# Patient Record
Sex: FEMALE | Race: BLACK OR AFRICAN AMERICAN | NOT HISPANIC OR LATINO | Employment: FULL TIME | URBAN - METROPOLITAN AREA
[De-identification: names, ages, dates, MRNs, and addresses within clinical notes are randomized per-mention and may not be internally consistent; named-entity substitution may affect disease eponyms.]

---

## 2017-01-20 ENCOUNTER — HOSPITAL ENCOUNTER (EMERGENCY)
Facility: HOSPITAL | Age: 47
Discharge: HOME | End: 2017-01-20
Attending: EMERGENCY MEDICINE

## 2017-01-20 VITALS
HEIGHT: 67 IN | SYSTOLIC BLOOD PRESSURE: 113 MMHG | DIASTOLIC BLOOD PRESSURE: 67 MMHG | OXYGEN SATURATION: 100 % | HEART RATE: 60 BPM | TEMPERATURE: 99 F | RESPIRATION RATE: 20 BRPM | BODY MASS INDEX: 29.66 KG/M2 | WEIGHT: 189 LBS

## 2017-01-20 DIAGNOSIS — G43.009 MIGRAINE WITHOUT AURA AND WITHOUT STATUS MIGRAINOSUS, NOT INTRACTABLE: Primary | ICD-10-CM

## 2017-01-20 DIAGNOSIS — R07.89 ATYPICAL CHEST PAIN: ICD-10-CM

## 2017-01-20 LAB
ALBUMIN SERPL BCP-MCNC: 4.1 G/DL
ALP SERPL-CCNC: 93 U/L
ALT SERPL W/O P-5'-P-CCNC: 8 U/L
ANION GAP SERPL CALC-SCNC: 12 MMOL/L
AST SERPL-CCNC: 18 U/L
BASOPHILS # BLD AUTO: 0.01 K/UL
BASOPHILS NFR BLD: 0.1 %
BILIRUB SERPL-MCNC: 0.4 MG/DL
BNP SERPL-MCNC: <10 PG/ML
BUN SERPL-MCNC: 12 MG/DL
CALCIUM SERPL-MCNC: 8.8 MG/DL
CHLORIDE SERPL-SCNC: 105 MMOL/L
CO2 SERPL-SCNC: 21 MMOL/L
CREAT SERPL-MCNC: 0.7 MG/DL
DIFFERENTIAL METHOD: NORMAL
EOSINOPHIL # BLD AUTO: 0 K/UL
EOSINOPHIL NFR BLD: 0.6 %
ERYTHROCYTE [DISTWIDTH] IN BLOOD BY AUTOMATED COUNT: 13.3 %
EST. GFR  (AFRICAN AMERICAN): >60 ML/MIN/1.73 M^2
EST. GFR  (NON AFRICAN AMERICAN): >60 ML/MIN/1.73 M^2
GLUCOSE SERPL-MCNC: 89 MG/DL
HCT VFR BLD AUTO: 37.4 %
HGB BLD-MCNC: 12.5 G/DL
INR PPP: 1
LYMPHOCYTES # BLD AUTO: 2.2 K/UL
LYMPHOCYTES NFR BLD: 33 %
MCH RBC QN AUTO: 29.3 PG
MCHC RBC AUTO-ENTMCNC: 33.4 %
MCV RBC AUTO: 88 FL
MONOCYTES # BLD AUTO: 0.6 K/UL
MONOCYTES NFR BLD: 8.4 %
NEUTROPHILS # BLD AUTO: 3.9 K/UL
NEUTROPHILS NFR BLD: 57.9 %
PLATELET # BLD AUTO: 280 K/UL
PMV BLD AUTO: 9.7 FL
POTASSIUM SERPL-SCNC: 4.3 MMOL/L
PROT SERPL-MCNC: 7.9 G/DL
PROTHROMBIN TIME: 10.4 SEC
RBC # BLD AUTO: 4.27 M/UL
SODIUM SERPL-SCNC: 138 MMOL/L
TROPONIN I SERPL DL<=0.01 NG/ML-MCNC: 0.01 NG/ML
WBC # BLD AUTO: 6.79 K/UL

## 2017-01-20 PROCEDURE — 83880 ASSAY OF NATRIURETIC PEPTIDE: CPT

## 2017-01-20 PROCEDURE — 85610 PROTHROMBIN TIME: CPT

## 2017-01-20 PROCEDURE — 25000003 PHARM REV CODE 250: Performed by: EMERGENCY MEDICINE

## 2017-01-20 PROCEDURE — 85025 COMPLETE CBC W/AUTO DIFF WBC: CPT

## 2017-01-20 PROCEDURE — 63600175 PHARM REV CODE 636 W HCPCS: Performed by: EMERGENCY MEDICINE

## 2017-01-20 PROCEDURE — 84484 ASSAY OF TROPONIN QUANT: CPT

## 2017-01-20 PROCEDURE — C9113 INJ PANTOPRAZOLE SODIUM, VIA: HCPCS | Performed by: EMERGENCY MEDICINE

## 2017-01-20 PROCEDURE — 99284 EMERGENCY DEPT VISIT MOD MDM: CPT | Mod: 25

## 2017-01-20 PROCEDURE — 93010 ELECTROCARDIOGRAM REPORT: CPT | Mod: ,,, | Performed by: INTERNAL MEDICINE

## 2017-01-20 PROCEDURE — 96361 HYDRATE IV INFUSION ADD-ON: CPT

## 2017-01-20 PROCEDURE — 96374 THER/PROPH/DIAG INJ IV PUSH: CPT

## 2017-01-20 PROCEDURE — 93005 ELECTROCARDIOGRAM TRACING: CPT

## 2017-01-20 PROCEDURE — 80053 COMPREHEN METABOLIC PANEL: CPT

## 2017-01-20 RX ORDER — ASPIRIN 325 MG
325 TABLET ORAL
Status: COMPLETED | OUTPATIENT
Start: 2017-01-20 | End: 2017-01-20

## 2017-01-20 RX ORDER — PROMETHAZINE HYDROCHLORIDE 25 MG/1
25 TABLET ORAL EVERY 6 HOURS PRN
Qty: 15 TABLET | Refills: 0 | Status: SHIPPED | OUTPATIENT
Start: 2017-01-20 | End: 2017-09-01

## 2017-01-20 RX ORDER — PANTOPRAZOLE SODIUM 40 MG/10ML
40 INJECTION, POWDER, LYOPHILIZED, FOR SOLUTION INTRAVENOUS
Status: COMPLETED | OUTPATIENT
Start: 2017-01-20 | End: 2017-01-20

## 2017-01-20 RX ADMIN — LIDOCAINE HYDROCHLORIDE: 20 SOLUTION ORAL; TOPICAL at 04:01

## 2017-01-20 RX ADMIN — ASPIRIN 325 MG ORAL TABLET 325 MG: 325 PILL ORAL at 04:01

## 2017-01-20 RX ADMIN — SODIUM CHLORIDE 500 ML: 0.9 INJECTION, SOLUTION INTRAVENOUS at 04:01

## 2017-01-20 RX ADMIN — PANTOPRAZOLE SODIUM 40 MG: 40 INJECTION, POWDER, FOR SOLUTION INTRAVENOUS at 04:01

## 2017-01-20 NOTE — ED AVS SNAPSHOT
[facility]  [address] [place] [address] Ave  [place] [address]               Leni Henson   2017  3:07 PM   ED    Description:  Female : 1970   Department:  [facility]           Your Care was Coordinated By:     Provider Role From To    Gayatri Jarvis MD Attending Provider [id number] --      Reason for Visit     Chest Pain           Diagnoses this Visit        Comments    Migraine without aura and without status migrainosus, not intractable    -  Primary     Atypical chest pain           ED Disposition     ED Disposition Condition Comment    Discharge             To Do List           Follow-up Information     Please follow up.    Why:  [facility] (Tel: [phone])       These Medications        Disp Refills Start End    promethazine (PHENERGAN) 25 MG tablet 15 tablet 0 2017     Take 1 tablet (25 mg total) by mouth every 6 (six) hours as needed for Nausea (headache). - Oral      [facility]     [facility] -  Assistance  Registered nurses in the [facility] provide clinical advisement, health education, appointment booking, and other advisory services.  Call for this free service at [phone].             Medications           Message regarding Medications     Verify the changes and/or additions to your medication regime listed below are the same as discussed with your clinician today.  If any of these changes or additions are incorrect, please notify your healthcare provider.        START taking these NEW medications        Refills    promethazine (PHENERGAN) 25 MG tablet 0    Sig: Take 1 tablet (25 mg total) by mouth every 6 (six) hours as needed for Nausea (headache).    Class: Print    Route: Oral      These medications were administered today        Dose Freq    aspirin tablet 325 mg 325 mg ED 1 Time    Sig: Take 1 tablet (325 mg total) by mouth ED 1 Time.    Class: Normal     "Route: Oral    pantoprazole injection 40 mg 40 mg ED 1 Time    Sig: Inject 40 mg into the vein ED 1 Time.    Class: Normal    Route: Intravenous    (pyxis) gi cocktail (mylanta 30 mL, lidocaine 2 % viscous 10 mL, dicyclomine 10 mL) 50 mL  ED 1 Time    Sig: Take by mouth ED 1 Time.    Class: Normal    Route: Oral    sodium chloride 0.9% bolus 500 mL 500 mL ED 1 Time    Sig: Inject 500 mLs into the vein ED 1 Time.    Class: Normal    Route: Intravenous           Verify that the below list of medications is an accurate representation of the medications you are currently taking.  If none reported, the list may be blank. If incorrect, please contact your healthcare provider. Carry this list with you in case of emergency.           Current Medications     lidocaine HCl 2% (LIDOCAINE VISCOUS) 2 % Soln by Mucous Membrane route every 4 (four) hours. DILUTE 10 MILLILITER(S) IN SMALL AMOUNT OF WARM WATER, GARGLE AND SPIT    promethazine (PHENERGAN) 25 MG tablet Take 1 tablet (25 mg total) by mouth every 6 (six) hours as needed for Nausea (headache).           Clinical Reference Information           Your Vitals Were     BP Pulse Temp Resp Height Weight    113/67 60 98.7 °F (37.1 °C) 20 5' 7" (1.702 m) 85.7 kg (189 lb)    SpO2 BMI             100% 29.6 kg/m2         Allergies as of 1/20/2017     No Known Allergies      Immunizations Administered on Date of Encounter - 1/20/2017     None      ED Micro, Lab, POCT     Start Ordered       Status Ordering Provider    01/20/17 1900 01/20/17 1755  Troponin I  Once      Final result     01/20/17 1600 01/20/17 1600  CBC auto differential  STAT      Final result     01/20/17 1600 01/20/17 1600  Comprehensive metabolic panel  STAT      Final result     01/20/17 1600 01/20/17 1600  Protime-INR  STAT      Final result     01/20/17 1600 01/20/17 1600  Troponin I  Now then every 3 hours     Comments:  PLEASE REVIEW ORDER START TIME BEFORE MARKING SPECIMEN COLLECTED.   Start Status   01/20/17 " 1600 Final result   01/20/17 1900 Final result       Acknowledged     01/20/17 1600 01/20/17 1600  B-Type natriuretic peptide (BNP)  STAT      Final result       ED Imaging Orders     Start Ordered       Status Ordering Provider    01/20/17 1600 01/20/17 1600  X-Ray Chest PA And Lateral  1 time imaging      Final result         Discharge Instructions         Noncardiac Chest Pain    Based on your visit today, the health care provider doesnt know what is causing your chest pain. In most cases, people who come to the emergency department with chest pain dont have a problem with their heart. Instead, the pain is caused by other conditions. These may be problems with the lungs, muscles, bones, digestive tract, nerves, or mental health.  Lung problems  · Inflammation around the lungs (pleurisy)  · Collapsed lung (pneumothorax)  · Fluid around the lungs (pleural effusion)  · Lung cancer. This is a rare cause of chest pain.  Muscle or bone problems  · Inflamed cartilage between the ribs (pleurisy)  · Fibromyalgia  · Rheumatoid arthritis  Digestive system problems  · Reflux  · Stomach ulcer  · Spasms of the esophagus  · Gall stones  · Gallbladder inflammation  Mental health conditions  · Panic or anxiety attacks  · Emotional distress  Your condition doesnt seem serious and your pain doesnt appear to be coming from your heart. But sometimes the signs of a serious problem take more time to appear. Watch for the warning signs listed below.  Home care  Follow these guidelines when caring for yourself at home:  · Rest today and avoid strenuous activity.  · Take any prescribed medicine as directed.  Follow-up care  Follow up with your health care provider, or as advised, if you dont start to feel better within 24 hours.  When to seek medical advice  Call your health care provider right away if any of these occur:  · A change in the type of pain. Call if it feels different, becomes more serious, lasts longer, or begins to  spread into your shoulder, arm, neck, jaw, or back.  · Shortness of breath  · You feel more pain when you breathe  · Cough with dark-colored mucus or blood  · Weakness, dizziness, or fainting  · Fever of 100.4ºF (38ºC) or higher, or as directed by your health care provider  · Swelling, pain, or redness in one leg  © 6117-1749 [employer]. [address] [place] [address], [place] [address]. All rights reserved. This information is not intended as a substitute for professional medical care. Always follow your healthcare professional's instructions.          Migraine Headache  This often severe type of headache is different from other types of headaches in that symptoms other than pain occur with the headache. Nausea and vomiting, lightheadedness, sensitivity to light (photophobia), and other visual disturbances are common migraine symptoms. The pain may last from a few hours to several days. It is not clear why migraines occur but transient factors called triggers can raise the risk of having a migraine attack. A migraine may be triggered by emotional stress or depression, or by hormone changes during the menstrual cycle. Other triggers include birth control pills, overuse of migraine medicines, alcohol or caffeine, foods with tyramine, eye strain, weather changes, missed meals, or too little or too much sleep.  Home care  Follow these tips when taking care of yourself at home:  · Dont drive yourself home if you were given pain medicine for your headache. Instead, have someone else drive you home. Try to sleep when you get home. You should feel much better when you wake up.  · Cold can help ease migraine symptoms. Put an ice pack on your forehead or at the base of your skull. Put heat on the back of your neck to help ease any neck spasm.  · Drink only clear liquids or eat a light diet until your symptoms get better. This will help you avoid nausea and vomiting.  How to prevent migraines  Pay attention to what  seems to trigger your headache. Try to avoid the triggers when you can. If you have frequent headaches, consider keeping a headache diary. In it, write down what you were doing, feeling, or eating in the hours before each headache. Show this to your health care provider to help find the cause of your headaches.  If stress seems to be a trigger for your headaches, figure out what is causing stress in your life. Learn new ways to handle your stress. Ideas include regular exercise, biofeedback, self-hypnosis, and meditation. Talk with your health care provider to find out more information about managing stress. Many books and digital media are also available on this subject.  Tyramine is a substance found in many foods. It can trigger a migraine in some people. These foods contain tyramine:  · Chocolate  · Yogurt  · All cheeses but cottage cheese and cream cheese  · Smoked or pickled fish and meat, including herring, caviar, bologna, pepperoni, and salami  · Liver  · Avocados  · Bananas  · Figs  · Raisins  · Red wine  Try staying away from these foods for 1 to 2 months to see if you have fewer headaches.  How to treat future headaches  · Take time out at the first sign of a headache, if possible. Find a quiet, dark, comfortable place to sit or lie down. Let yourself relax or sleep.  · Put an ice pack on your forehead or on the area of greatest pain. A heating pad and massage may help if you are having a muscle spasm and tightness in your neck.  · If you have been prescribed a medicine to stop a migraine headache, use this at the first warning sign of the headache for best results. First signs may be an aura or pain.  · If you need to take medicine often for your migraine, talk with your healthcare provider about other ways to prevent your headaches.  Follow-up care  Follow up with your healthcare provider if your headache doesnt get better within the next 24 hours. Talk with your provider if you have frequent  headaches. He or she can figure out a treatment plan. Ask if you can have medicine to take at home the next time you get a bad headache. This may keep you from having to visit the emergency department in the future. You may need to see a headache specialist (neurologist) if you continue to have headaches.  When to seek medical advice  Call your healthcare provider right away if any of these occur:  · Your head pain gets worse, or doesnt get better within 24 hours  · You cant keep liquids down (repeated vomiting)  · Pain in your sinuses, ears, or throat  · Fever of 100.4º F (38º C) or higher, or as directed by your healthcare provider  · Stiff neck  · Extreme drowsiness, confusion, or fainting  · Dizziness, or dizziness with spinning sensation (vertigo)  · Weakness in an arm or leg, or on one side of your face  · Difficulty talking or seeing  © 6677-1391 "[employer]". [address] [place] [address]. All rights reserved. This information is not intended as a substitute for professional medical care. Always follow your healthcare professional's instructions.          [facility] Sign-Up     Activating your [facility] account is as easy as 1-2-3!     1) Visit [employer] [email or web address], select Sign Up Now, enter this activation code and your date of birth, then select Next.  [email or web address]  Expires: 3/6/2017  7:45 PM      2) Create a username and password to use when you visit [facility] in the future and select a security question in case you lose your password and select Next.    3) Enter your e-mail address and click Sign Up!    Additional Information  If you have questions, please e-mail [email or web address] or call [phone] to talk to our [facility] staff. Remember, [facility] is NOT to be used for urgent needs. For medical emergencies, dial 911.          [facility] complies with applicable Federal civil rights laws and does not discriminate on the basis of race, color, national  origin, age, disability, or sex.        Language Assistance Services     ATTENTION: Language assistance services are available, free of charge. Please call [phone].      ATENCIÓN: Si habrandi combs, tiene a crandall disposición servicios gratuitos de asistencia lingüística. Llame al [phone].     CHÚ Ý: N?u b?n nói Ti?ng Vi?t, có các d?ch v? h? tr? ngôn ng? mi?n phí dành cho b?n. G?i s? [phone].

## 2017-01-20 NOTE — ED NOTES
APPEARANCE: Alert, oriented and in no acute distress.  PERIPHERAL VASCULAR: peripheral pulses present. Normal cap refill. No edema. Warm to touch.    RESPIRATORY:Normal rate and effort, breath sounds clear bilaterally throughout chest. Respirations are equal and unlabored no obvious signs of distress.  GASTRO: soft, bowel sounds normal, no tenderness, no abdominal distention.  MUSC: Full ROM. No bony tenderness or soft tissue tenderness. No obvious deformity.  SKIN: Skin is warm and dry, normal skin turgor, mucous membranes moist.  NEURO: 5/5 strength major flexors/extensors bilaterally. Sensory intact to light touch bilaterally. Ana coma scale: eyes open spontaneously-4, oriented & converses-5, obeys commands-6. No neurological abnormalities.   MENTAL STATUS: awake, alert and aware of environment.  EYE: PERRL, both eyes: pupils brisk and reactive to light. Normal size.  ENT: EARS: no obvious drainage. NOSE: no active bleeding.

## 2017-01-20 NOTE — ED PROVIDER NOTES
Encounter Date: 1/20/2017       History     Chief Complaint   Patient presents with    Chest Pain     began this morning, headache, vomited once today, dizziness     Review of patient's allergies indicates:  No Known Allergies  The history is provided by the patient.    the patient presents emergency department with chest pain and a headache that awakened her at 3 AM.  She went to work and was dizzy at work.  She had her blood pressure checked at work and her systolic blood pressure was 190, she is unsure what her diastolic blood pressure once.  The headache and chest pain has been persistent since 3 AM this morning.  The patient states that her chest pain is a heaviness to her midsternal region and is nonradiating.  The patient denies a cough or fever no shortness of breath no diaphoresis.  Patient has no history of coronary artery disease.  History reviewed. No pertinent past medical history.  No past medical history pertinent negatives.  Past Surgical History   Procedure Laterality Date    Tubal ligation       History reviewed. No pertinent family history.  Social History   Substance Use Topics    Smoking status: Never Smoker    Smokeless tobacco: None    Alcohol use Yes      Comment: social     Review of Systems   Constitutional: Negative for fever.   HENT: Negative for sore throat.    Respiratory: Positive for chest tightness. Negative for cough and shortness of breath.    Cardiovascular: Negative for chest pain, palpitations and leg swelling.   Gastrointestinal: Positive for abdominal pain (gastric pain) and nausea. Negative for diarrhea and vomiting.   Genitourinary: Negative for dysuria.   Musculoskeletal: Negative for back pain.   Skin: Negative for rash.   Neurological: Negative for weakness.   Hematological: Does not bruise/bleed easily.       Physical Exam   Initial Vitals   BP Pulse Resp Temp SpO2   01/20/17 1357 01/20/17 1357 01/20/17 1357 01/20/17 1357 01/20/17 1357   123/71 66 20 98.7 °F (37.1  °C) 100 %     Physical Exam    Nursing note and vitals reviewed.  Constitutional: She appears well-developed and well-nourished.   HENT:   Head: Normocephalic and atraumatic.   Mouth/Throat: Oropharynx is clear and moist.   Eyes: Conjunctivae and EOM are normal. Pupils are equal, round, and reactive to light.   Neck: Normal range of motion. Neck supple.   Cardiovascular: Normal rate, regular rhythm, normal heart sounds and intact distal pulses. Exam reveals no gallop and no friction rub.    No murmur heard.  Pulmonary/Chest: Breath sounds normal.   Abdominal: Soft. Bowel sounds are normal. She exhibits no distension. There is no tenderness. There is no rebound and no guarding.   Musculoskeletal: Normal range of motion. She exhibits no edema or tenderness.   Lymphadenopathy:     She has no cervical adenopathy.   Neurological: She is alert and oriented to person, place, and time. She has normal strength and normal reflexes.   Skin: Skin is warm and dry.   Psychiatric: She has a normal mood and affect. Her behavior is normal. Judgment and thought content normal.         ED Course   Procedures  Labs Reviewed   CBC W/ AUTO DIFFERENTIAL   COMPREHENSIVE METABOLIC PANEL   PROTIME-INR   TROPONIN I   B-TYPE NATRIURETIC PEPTIDE   TROPONIN I     EKG Readings: (Independently Interpreted)   Rhythm: Normal Sinus Rhythm. Heart Rate: 61. Ectopy: No Ectopy. Conduction: Normal. ST Segments: Normal ST Segments. T Waves: Normal. Clinical Impression: Normal Sinus Rhythm          Medical Decision Making:   Clinical Tests:   Lab Tests: Ordered and Reviewed  The following lab test(s) were unremarkable: CBC, CMP, Troponin, BNP and PT  Radiological Study: Ordered and Reviewed  Medical Tests: Ordered and Reviewed  ED Management:  The patient came to the emergency department with chest pain and headache is 3 AM.  She had complete relief of her chest pain/epigastric pain with proton aches and a GI cocktail.  Her headache is still severe.  She  has photophobia.  She will be discharged with pain medications, she is driving home                   ED Course     Clinical Impression:   The primary encounter diagnosis was Migraine without aura and without status migrainosus, not intractable. A diagnosis of Atypical chest pain was also pertinent to this visit.          Gayatri Jarvis MD  01/20/17 1945

## 2017-01-20 NOTE — ED NOTES
46 year old female presents to ed with chief complaint of chest pain. Patient states chest pain began last night. Patient also complaining of dizziness since this am. Patient denies sob at this time

## 2017-01-21 NOTE — DISCHARGE INSTRUCTIONS
Noncardiac Chest Pain    Based on your visit today, the health care provider doesnt know what is causing your chest pain. In most cases, people who come to the emergency department with chest pain dont have a problem with their heart. Instead, the pain is caused by other conditions. These may be problems with the lungs, muscles, bones, digestive tract, nerves, or mental health.  Lung problems  · Inflammation around the lungs (pleurisy)  · Collapsed lung (pneumothorax)  · Fluid around the lungs (pleural effusion)  · Lung cancer. This is a rare cause of chest pain.  Muscle or bone problems  · Inflamed cartilage between the ribs (pleurisy)  · Fibromyalgia  · Rheumatoid arthritis  Digestive system problems  · Reflux  · Stomach ulcer  · Spasms of the esophagus  · Gall stones  · Gallbladder inflammation  Mental health conditions  · Panic or anxiety attacks  · Emotional distress  Your condition doesnt seem serious and your pain doesnt appear to be coming from your heart. But sometimes the signs of a serious problem take more time to appear. Watch for the warning signs listed below.  Home care  Follow these guidelines when caring for yourself at home:  · Rest today and avoid strenuous activity.  · Take any prescribed medicine as directed.  Follow-up care  Follow up with your health care provider, or as advised, if you dont start to feel better within 24 hours.  When to seek medical advice  Call your health care provider right away if any of these occur:  · A change in the type of pain. Call if it feels different, becomes more serious, lasts longer, or begins to spread into your shoulder, arm, neck, jaw, or back.  · Shortness of breath  · You feel more pain when you breathe  · Cough with dark-colored mucus or blood  · Weakness, dizziness, or fainting  · Fever of 100.4ºF (38ºC) or higher, or as directed by your health care provider  · Swelling, pain, or redness in one leg  © 5318-2845 [employer] [address]  [place] [address]. All rights reserved. This information is not intended as a substitute for professional medical care. Always follow your healthcare professional's instructions.          Migraine Headache  This often severe type of headache is different from other types of headaches in that symptoms other than pain occur with the headache. Nausea and vomiting, lightheadedness, sensitivity to light (photophobia), and other visual disturbances are common migraine symptoms. The pain may last from a few hours to several days. It is not clear why migraines occur but transient factors called triggers can raise the risk of having a migraine attack. A migraine may be triggered by emotional stress or depression, or by hormone changes during the menstrual cycle. Other triggers include birth control pills, overuse of migraine medicines, alcohol or caffeine, foods with tyramine, eye strain, weather changes, missed meals, or too little or too much sleep.  Home care  Follow these tips when taking care of yourself at home:  · Dont drive yourself home if you were given pain medicine for your headache. Instead, have someone else drive you home. Try to sleep when you get home. You should feel much better when you wake up.  · Cold can help ease migraine symptoms. Put an ice pack on your forehead or at the base of your skull. Put heat on the back of your neck to help ease any neck spasm.  · Drink only clear liquids or eat a light diet until your symptoms get better. This will help you avoid nausea and vomiting.  How to prevent migraines  Pay attention to what seems to trigger your headache. Try to avoid the triggers when you can. If you have frequent headaches, consider keeping a headache diary. In it, write down what you were doing, feeling, or eating in the hours before each headache. Show this to your health care provider to help find the cause of your headaches.  If stress seems to be a trigger for your  headaches, figure out what is causing stress in your life. Learn new ways to handle your stress. Ideas include regular exercise, biofeedback, self-hypnosis, and meditation. Talk with your health care provider to find out more information about managing stress. Many books and digital media are also available on this subject.  Tyramine is a substance found in many foods. It can trigger a migraine in some people. These foods contain tyramine:  · Chocolate  · Yogurt  · All cheeses but cottage cheese and cream cheese  · Smoked or pickled fish and meat, including herring, caviar, bologna, pepperoni, and salami  · Liver  · Avocados  · Bananas  · Figs  · Raisins  · Red wine  Try staying away from these foods for 1 to 2 months to see if you have fewer headaches.  How to treat future headaches  · Take time out at the first sign of a headache, if possible. Find a quiet, dark, comfortable place to sit or lie down. Let yourself relax or sleep.  · Put an ice pack on your forehead or on the area of greatest pain. A heating pad and massage may help if you are having a muscle spasm and tightness in your neck.  · If you have been prescribed a medicine to stop a migraine headache, use this at the first warning sign of the headache for best results. First signs may be an aura or pain.  · If you need to take medicine often for your migraine, talk with your healthcare provider about other ways to prevent your headaches.  Follow-up care  Follow up with your healthcare provider if your headache doesnt get better within the next 24 hours. Talk with your provider if you have frequent headaches. He or she can figure out a treatment plan. Ask if you can have medicine to take at home the next time you get a bad headache. This may keep you from having to visit the emergency department in the future. You may need to see a headache specialist (neurologist) if you continue to have headaches.  When to seek medical advice  Call your healthcare  provider right away if any of these occur:  · Your head pain gets worse, or doesnt get better within 24 hours  · You cant keep liquids down (repeated vomiting)  · Pain in your sinuses, ears, or throat  · Fever of 100.4º F (38º C) or higher, or as directed by your healthcare provider  · Stiff neck  · Extreme drowsiness, confusion, or fainting  · Dizziness, or dizziness with spinning sensation (vertigo)  · Weakness in an arm or leg, or on one side of your face  · Difficulty talking or seeing  © 7209-4802 [employer]. [address] [place] [address]. All rights reserved. This information is not intended as a substitute for professional medical care. Always follow your healthcare professional's instructions.

## 2017-08-22 ENCOUNTER — HOSPITAL ENCOUNTER (EMERGENCY)
Facility: HOSPITAL | Age: 47
Discharge: HOME | End: 2017-08-22
Attending: EMERGENCY MEDICINE | Admitting: EMERGENCY MEDICINE

## 2017-08-22 VITALS
BODY MASS INDEX: 28.12 KG/M2 | SYSTOLIC BLOOD PRESSURE: 117 MMHG | DIASTOLIC BLOOD PRESSURE: 69 MMHG | HEIGHT: 66 IN | RESPIRATION RATE: 20 BRPM | WEIGHT: 175 LBS | HEART RATE: 68 BPM | TEMPERATURE: 99 F | OXYGEN SATURATION: 99 %

## 2017-08-22 DIAGNOSIS — H66.91 ACUTE RIGHT OTITIS MEDIA: Primary | ICD-10-CM

## 2017-08-22 PROCEDURE — 25000003 PHARM REV CODE 250: Performed by: NURSE PRACTITIONER

## 2017-08-22 PROCEDURE — 99284 EMERGENCY DEPT VISIT MOD MDM: CPT

## 2017-08-22 RX ORDER — NAPROXEN 500 MG/1
500 TABLET ORAL 2 TIMES DAILY WITH MEALS
Qty: 14 TABLET | Refills: 0 | Status: SHIPPED | OUTPATIENT
Start: 2017-08-22 | End: 2017-09-01 | Stop reason: ALTCHOICE

## 2017-08-22 RX ORDER — NAPROXEN 500 MG/1
500 TABLET ORAL
Status: COMPLETED | OUTPATIENT
Start: 2017-08-22 | End: 2017-08-22

## 2017-08-22 RX ORDER — AMOXICILLIN 500 MG/1
500 CAPSULE ORAL EVERY 8 HOURS
Qty: 21 CAPSULE | Refills: 0 | Status: SHIPPED | OUTPATIENT
Start: 2017-08-22 | End: 2017-08-29

## 2017-08-22 RX ORDER — HYDROCODONE BITARTRATE AND ACETAMINOPHEN 5; 325 MG/1; MG/1
1 TABLET ORAL EVERY 6 HOURS PRN
Qty: 8 TABLET | Refills: 0 | Status: SHIPPED | OUTPATIENT
Start: 2017-08-22 | End: 2017-11-13

## 2017-08-22 RX ADMIN — NAPROXEN 500 MG: 500 TABLET ORAL at 05:08

## 2017-08-22 NOTE — ED PROVIDER NOTES
Encounter Date: 8/22/2017       History     Chief Complaint   Patient presents with    Otalgia     right ear pain began on Sunday     47-year-old female presents to the ED for right ear pain since Saturday.  Patient reports the pain has gradually gotten worse.  No fever, trauma, ear discharge, or rash.  She reports that she has been dealing with nasal congestion for about 2 weeks.  Patient has tried Tylenol for pain without relief.  Nothing seems to make it better or worse.  She reports that she has heard ringing in her ears and decreased hearing.  No dizziness or headache.      The history is provided by the patient.   Otalgia   This is a new problem. The current episode started two days ago. There is pain in the right ear. The problem occurs constantly. The problem has been gradually worsening. The pain is at a severity of 9/10. Pertinent negatives include no ear discharge, no headaches, no hearing loss, no rhinorrhea, no sore throat, no abdominal pain, no diarrhea, no vomiting, no neck pain, no cough and no rash. Her past medical history does not include chronic ear infection or tympanostomy tube.     Review of patient's allergies indicates:  No Known Allergies  History reviewed. No pertinent past medical history.  Past Surgical History:   Procedure Laterality Date    TUBAL LIGATION       History reviewed. No pertinent family history.  Social History   Substance Use Topics    Smoking status: Never Smoker    Smokeless tobacco: Never Used    Alcohol use Yes      Comment: social     Review of Systems   Constitutional: Negative for chills, fatigue and fever.   HENT: Positive for congestion, ear pain, sinus pressure and tinnitus. Negative for ear discharge, hearing loss, postnasal drip, rhinorrhea and sore throat.    Respiratory: Negative for cough and shortness of breath.    Cardiovascular: Negative for chest pain.   Gastrointestinal: Negative for abdominal pain, diarrhea, nausea and vomiting.   Musculoskeletal:  Negative for neck pain.   Skin: Negative for rash.   Allergic/Immunologic: Negative for immunocompromised state.   Neurological: Negative for weakness and headaches.   Psychiatric/Behavioral: Negative for confusion.   All other systems reviewed and are negative.      Physical Exam     Initial Vitals [08/22/17 1704]   BP Pulse Resp Temp SpO2   117/69 68 20 98.9 °F (37.2 °C) 99 %      MAP       85         Physical Exam    Nursing note and vitals reviewed.  Constitutional: Vital signs are normal. She appears well-developed and well-nourished. She is active and cooperative. She is easily aroused.  Non-toxic appearance. She does not have a sickly appearance. She does not appear ill. No distress.   HENT:   Head: Normocephalic and atraumatic.   Right Ear: Hearing, external ear and ear canal normal. There is tenderness. No drainage or swelling. No foreign bodies. No mastoid tenderness. Tympanic membrane is injected, erythematous and bulging. Tympanic membrane is not scarred, not perforated and not retracted. Tympanic membrane mobility is abnormal. A middle ear effusion is present. No hemotympanum. No decreased hearing is noted.   Left Ear: Hearing, external ear and ear canal normal. Tympanic membrane is not erythematous and not retracted. A middle ear effusion is present.   Nose: Mucosal edema present. No rhinorrhea or sinus tenderness. Right sinus exhibits no frontal sinus tenderness. Left sinus exhibits no maxillary sinus tenderness and no frontal sinus tenderness.   Mouth/Throat: Uvula is midline, oropharynx is clear and moist and mucous membranes are normal.   Eyes: Conjunctivae and EOM are normal.   Neck: Normal range of motion. Neck supple.   Cardiovascular: Normal rate, regular rhythm and normal heart sounds.   Pulmonary/Chest: Effort normal and breath sounds normal.   Abdominal: Soft. Normal appearance and bowel sounds are normal.   Lymphadenopathy:        Head (right side): No preauricular and no posterior  auricular adenopathy present.        Head (left side): No preauricular and no posterior auricular adenopathy present.     She has no cervical adenopathy.   Neurological: She is alert, oriented to person, place, and time and easily aroused. She has normal strength. GCS eye subscore is 4. GCS verbal subscore is 5. GCS motor subscore is 6.   Skin: Skin is warm, dry and intact. No abrasion, no bruising, no burn, no ecchymosis, no laceration, no lesion, no rash and no abscess noted. No erythema.   Psychiatric: She has a normal mood and affect. Her speech is normal and behavior is normal. Judgment and thought content normal. Cognition and memory are normal.         ED Course   Procedures  Labs Reviewed - No data to display          Medical Decision Making:   Initial Assessment:   48yo female here for right ear pain since Sunday.  No trauma, fever/chills, rash, or vomiting.  The patient reports that she has had decreased hearing and tinnitus in her right ear.  No ear drainage.  No dizziness.  The patient appears well, nontoxic.  Vital stable.  Left ear with effusion but no infection.  Right middle ear effusion.  TM erythema and bulging.  Canal normal.  No mastoid tenderness.  Nasal mucosal edema.  Uvula midline, tonsils normal..  Neck normal.  No rash or signs of trauma.  Differential Diagnosis:   AOM, EOM, impaction, foreign body, otalgia, URI, effusion  ED Management:  By mouth Naprosyn  The patient has right AOM.  There is no indication for labs or imaging at this time.  I advised increase fluid intake, and follow up with the [facility] clinic or her PCP within 2-3 days.  I reviewed strict return precautions.  Rx amoxicillin, Naprosyn, and Norco.  Pt verbalized understanding, compliance, and agreement with the treatment plan.                Attending Attestation:     Physician Attestation Statement for NP/PA:   I discussed this assessment and plan of this patient with the NP/PA, but I did not personally examine the  patient. The face to face encounter was performed by the NP/PA.                  ED Course     Clinical Impression:   The encounter diagnosis was Acute right otitis media.                           ADRIA Fonseca  08/22/17 1746       Shay Jon MD  08/22/17 1743

## 2017-09-01 ENCOUNTER — HOSPITAL ENCOUNTER (EMERGENCY)
Facility: HOSPITAL | Age: 47
Discharge: HOME | End: 2017-09-01
Attending: EMERGENCY MEDICINE

## 2017-09-01 VITALS
RESPIRATION RATE: 20 BRPM | WEIGHT: 172 LBS | BODY MASS INDEX: 27.64 KG/M2 | SYSTOLIC BLOOD PRESSURE: 119 MMHG | HEIGHT: 66 IN | DIASTOLIC BLOOD PRESSURE: 72 MMHG | TEMPERATURE: 99 F | HEART RATE: 81 BPM | OXYGEN SATURATION: 100 %

## 2017-09-01 DIAGNOSIS — J06.9 VIRAL URI WITH COUGH: Primary | ICD-10-CM

## 2017-09-01 PROCEDURE — 96372 THER/PROPH/DIAG INJ SC/IM: CPT

## 2017-09-01 PROCEDURE — 63600175 PHARM REV CODE 636 W HCPCS: Performed by: PHYSICIAN ASSISTANT

## 2017-09-01 PROCEDURE — 99283 EMERGENCY DEPT VISIT LOW MDM: CPT | Mod: 25

## 2017-09-01 RX ORDER — NAPROXEN 500 MG/1
500 TABLET ORAL 2 TIMES DAILY WITH MEALS
Qty: 30 TABLET | Refills: 0 | Status: SHIPPED | OUTPATIENT
Start: 2017-09-01 | End: 2017-11-13 | Stop reason: CLARIF

## 2017-09-01 RX ORDER — GUAIFENESIN AND DEXTROMETHORPHAN HYDROBROMIDE 60; 1200 MG/1; MG/1
1 TABLET, EXTENDED RELEASE ORAL 2 TIMES DAILY PRN
Qty: 30 TABLET | Refills: 0 | Status: SHIPPED | OUTPATIENT
Start: 2017-09-01 | End: 2017-09-11

## 2017-09-01 RX ORDER — FLUTICASONE PROPIONATE 50 MCG
1 SPRAY, SUSPENSION (ML) NASAL 2 TIMES DAILY PRN
Qty: 15 G | Refills: 0 | Status: SHIPPED | OUTPATIENT
Start: 2017-09-01 | End: 2018-05-11 | Stop reason: SDUPTHER

## 2017-09-01 RX ORDER — AMOXICILLIN 500 MG/1
500 CAPSULE ORAL
COMMUNITY
End: 2017-11-13

## 2017-09-01 RX ORDER — CETIRIZINE HYDROCHLORIDE 10 MG/1
10 TABLET ORAL DAILY
Qty: 30 TABLET | Refills: 0 | Status: SHIPPED | OUTPATIENT
Start: 2017-09-01 | End: 2019-03-15

## 2017-09-01 RX ORDER — DEXAMETHASONE SODIUM PHOSPHATE 4 MG/ML
8 INJECTION, SOLUTION INTRA-ARTICULAR; INTRALESIONAL; INTRAMUSCULAR; INTRAVENOUS; SOFT TISSUE
Status: COMPLETED | OUTPATIENT
Start: 2017-09-01 | End: 2017-09-01

## 2017-09-01 RX ADMIN — DEXAMETHASONE SODIUM PHOSPHATE 8 MG: 4 INJECTION, SOLUTION INTRAMUSCULAR; INTRAVENOUS at 04:09

## 2017-09-01 NOTE — ED NOTES
Patient identifiers verified and correct for Leni Henson.   Was seen here 08/22/17 for right ear pain. Was prescribed Amoxil, naprosyn and  Narco. Completed the antibiotics Wednesday. Started with right ear pain, hearing loss.Complaining of nasal congestion.  HEENT: Nasal congestion and drainage, sore throat Right ear pain, some redness noted.    LOC: The patient is awake, alert and aware of environment with an appropriate affect, the patient is oriented x 3 and speaking appropriately.  APPEARANCE: Patient resting comfortably and in no acute distress, patient is clean and well groomed, patient's clothing is properly fastened.  SKIN: The skin is warm and dry, color consistent with ethnicity, patient has normal skin turgor and moist mucus membranes, skin intact, no breakdown or bruising noted.  MUSCULOSKELETAL: Patient moving all extremities spontaneously, no obvious swelling or deformities noted.  RESPIRATORY: Airway is open and patent, respirations are spontaneous, patient has a normal effort and rate, no accessory muscle use noted, bilateral breath sounds clear.  CARDIAC: Patient has a normal rate and regular rhythm, no periphreal edema noted, capillary refill < 3 seconds.  ABDOMEN: Soft and non tender to palpation, no distention noted, normoactive bowel sounds present in all four quadrants.  NEUROLOGIC: PERRL, 3mm bilaterally, eyes open spontaneously, behavior appropriate to situation, follows commands, facial expression symmetrical, bilateral hand grasp equal and even, purposeful motor response noted, normal sensation in all extremities when touched with a finger.

## 2017-09-01 NOTE — ED PROVIDER NOTES
"Encounter Date: 9/1/2017       History     Chief Complaint   Patient presents with    Nasal Congestion     pt c/o nasal congestion, right ear pain, and productive cough with "white " sputum. pt reports she was seen here in ED on 8/22/2017 with same symptoms and was prescirbed rx for norco, amoxil, and naproxen       Leni Henson 47 y.o. afebrile female that is typically well presented to the ED with c/o evaluation of URI symptoms.  She states that she was seen in this ED on 8/22 with right ear pain exclusively at that time.  She was started on Augmentin for otitis media and sent home with Naprosyn and Norco with instructions on when necessary use.  She states that she has completed Augmentin and used all of the Naprosyn with little relief.  She states that she took Norco ×2 however did not like the way this medication made her feel.  She says that the ear pain did improve minimally however began to increase over the last few days.  She now reports muffled hearing, sinus pressure, nasal congestion, rhinorrhea, postnasal drip, sore throat and dry cough.  She has not tried any other medications for the symptoms.  She denies any fever, chills, headache, vision change, ear drainage or neck pain.       The history is provided by the patient.     Review of patient's allergies indicates:  No Known Allergies  History reviewed. No pertinent past medical history.  Past Surgical History:   Procedure Laterality Date    TUBAL LIGATION       History reviewed. No pertinent family history.  Social History   Substance Use Topics    Smoking status: Never Smoker    Smokeless tobacco: Never Used    Alcohol use Yes      Comment: social     Review of Systems   Constitutional: Negative for appetite change, chills and fever.   HENT: Positive for congestion, ear pain, postnasal drip, rhinorrhea, sinus pain and sinus pressure. Negative for sore throat.    Eyes: Negative for pain.   Respiratory: Positive for cough (dry). Negative for " shortness of breath.    Cardiovascular: Negative for chest pain.   Gastrointestinal: Negative for abdominal pain, nausea and vomiting.   Musculoskeletal: Negative for arthralgias, back pain and myalgias.   Skin: Negative for rash.   Neurological: Negative for dizziness, weakness, light-headedness and headaches.   Hematological: Does not bruise/bleed easily.       Physical Exam     Initial Vitals [09/01/17 1510]   BP Pulse Resp Temp SpO2   126/83 76 20 98 °F (36.7 °C) 98 %      MAP       97.33         Physical Exam    Nursing note and vitals reviewed.  Constitutional: Vital signs are normal. She appears well-developed and well-nourished. She is cooperative.  Non-toxic appearance. She appears ill. No distress.   HENT:   Head: Normocephalic and atraumatic.   Right Ear: Tympanic membrane is not erythematous. A middle ear effusion is present.   Left Ear: Tympanic membrane is not erythematous. A middle ear effusion is present.   Nose: Mucosal edema present. Right sinus exhibits maxillary sinus tenderness and frontal sinus tenderness.   Mouth/Throat: Mucous membranes are not dry. No posterior oropharyngeal edema, posterior oropharyngeal erythema or tonsillar abscesses.   Mild erythema of the oropharynx with no edema or tonsillar edema or exudate.   Eyes: Conjunctivae and lids are normal.   Neck: Neck supple. No neck rigidity.   Cardiovascular: Normal rate and regular rhythm.   Pulmonary/Chest: Breath sounds normal. No respiratory distress. She has no wheezes. She has no rhonchi.   Abdominal: Soft. Normal appearance and bowel sounds are normal. There is no tenderness. There is no rigidity and no guarding.   Musculoskeletal: Normal range of motion.   Neurological: She is alert and oriented to person, place, and time. GCS eye subscore is 4. GCS verbal subscore is 5. GCS motor subscore is 6.   Skin: Skin is warm, dry and intact. No rash noted.   Psychiatric: She has a normal mood and affect. Her speech is normal and behavior  is normal. Thought content normal.         ED Course   Procedures  Labs Reviewed - No data to display    Leni Henson 47 y.o. afebrile female that is typically well presented to the ED with c/o evaluation of URI symptoms.  She states that she was seen in this ED on 8/22 with right ear pain exclusively at that time.  She was started on Augmentin for otitis media and sent home with Naprosyn and Norco with instructions on when necessary use.  She states that she has completed Augmentin and used all of the Naprosyn with little relief.  She states that she took Norco ×2 however did not like the way this medication made her feel.  She says that the ear pain did improve minimally however began to increase over the last few days.  She now reports muffled hearing, sinus pressure, nasal congestion, rhinorrhea, postnasal drip, sore throat and dry cough.  She has not tried any other medications for the symptoms.  She denies any fever, chills, headache, vision change, ear drainage or neck pain.  ROS positive for URI symptoms.  Physical exam reveals patient that appears ill but nontoxic. TM's with bilateral serous otitis media with no erythema or exudate; nose with congestion and rhinorrhea. Oropharynx with mild erythema; no edema or exudate.  TTP of the right frontal maxillary sinuses Lungs clear and free of wheeze. Heart regular rate and rhythm. Abdomen is soft and nontender with normal bowel sounds. FROM of neck, no lymphadenopathy and FROM of all extremities with strength 5/5 bilaterally. Skin free of rash, pallor and diaphoresis.    DDX: influenza, viral URI with cough, otitis media, otitis externa    ED management: No labs are imaging warranted at this time as low suspicion for bacterial etiology in this patient with clinical exam findings consistent with upper respiratory infection.  No signs of suppurativa OM. . We will send home with supportive medications for probable viral URI. Instructed patient on fever and  symptom control.      Impression/Plan: The encounter diagnosis was Viral URI with cough. Discharged with Flonase, Naprosyn, Zyrtec, Mucinex DM. Patient will follow up with Primary.  Patient cautioned on when to return to ED.  Pt. Understands and agrees with current treatment plan                                             Attending Attestation:     Physician Attestation Statement for NP/PA:   I have conducted a face to face encounter with this patient in addition to the NP/PA, due to NP/PA Request    Other NP/PA Attestation Additions:    History of Present Illness: Agree; 47-year-old female present see emergency department complaining of URI symptoms.  Initially seen a few days ago complaining of only right ear pain.  She was started on Augmentin for otitis media and sent home with Naprosyn and Norco.  Reports she has completed her prescriptions except for the Norco, which she does not tolerate particularly well.  Reports improvement in the year pain, initially, but it is now recurred.  Also reports sinus pressure, muffled hearing, nasal congestion, rhinorrhea, sore throat, postnasal drip, nonproductive cough.  No other exacerbating alleviating factors reported.   Physical Exam: Agree   Medical Decision Making: Agree; patient given symptomatically management in the emergency department with improvement in symptoms.  Discussed disposition including home management, follow-up with primary care physician, return with any new or worsening symptoms                 ED Course      Clinical Impression:   The encounter diagnosis was Viral URI with cough.                           MARIAM Rider  09/01/17 1601       Luis Miguel Torres MD  09/05/17 [id number]

## 2017-11-13 ENCOUNTER — HOSPITAL ENCOUNTER (EMERGENCY)
Facility: HOSPITAL | Age: 47
Discharge: HOME | End: 2017-11-13
Attending: EMERGENCY MEDICINE

## 2017-11-13 VITALS
SYSTOLIC BLOOD PRESSURE: 128 MMHG | OXYGEN SATURATION: 97 % | TEMPERATURE: 98 F | WEIGHT: 175 LBS | BODY MASS INDEX: 28.12 KG/M2 | RESPIRATION RATE: 16 BRPM | DIASTOLIC BLOOD PRESSURE: 76 MMHG | HEIGHT: 66 IN | HEART RATE: 76 BPM

## 2017-11-13 DIAGNOSIS — R07.89 CHEST WALL PAIN: Primary | ICD-10-CM

## 2017-11-13 DIAGNOSIS — R07.9 CHEST PAIN: ICD-10-CM

## 2017-11-13 LAB
ALBUMIN SERPL BCP-MCNC: 3.4 G/DL
ALP SERPL-CCNC: 91 U/L
ALT SERPL W/O P-5'-P-CCNC: 8 U/L
ANION GAP SERPL CALC-SCNC: 7 MMOL/L
AST SERPL-CCNC: 14 U/L
BASOPHILS # BLD AUTO: 0.02 K/UL
BASOPHILS NFR BLD: 0.3 %
BILIRUB SERPL-MCNC: 0.2 MG/DL
BUN SERPL-MCNC: 11 MG/DL
CALCIUM SERPL-MCNC: 9 MG/DL
CHLORIDE SERPL-SCNC: 105 MMOL/L
CO2 SERPL-SCNC: 28 MMOL/L
CREAT SERPL-MCNC: 1 MG/DL
DIFFERENTIAL METHOD: ABNORMAL
EOSINOPHIL # BLD AUTO: 0.1 K/UL
EOSINOPHIL NFR BLD: 1.4 %
ERYTHROCYTE [DISTWIDTH] IN BLOOD BY AUTOMATED COUNT: 14 %
EST. GFR  (AFRICAN AMERICAN): >60 ML/MIN/1.73 M^2
EST. GFR  (NON AFRICAN AMERICAN): >60 ML/MIN/1.73 M^2
GLUCOSE SERPL-MCNC: 105 MG/DL
HCT VFR BLD AUTO: 30.3 %
HGB BLD-MCNC: 9.5 G/DL
LYMPHOCYTES # BLD AUTO: 2.6 K/UL
LYMPHOCYTES NFR BLD: 36.6 %
MCH RBC QN AUTO: 25.3 PG
MCHC RBC AUTO-ENTMCNC: 31.4 G/DL
MCV RBC AUTO: 81 FL
MONOCYTES # BLD AUTO: 0.8 K/UL
MONOCYTES NFR BLD: 11.7 %
NEUTROPHILS # BLD AUTO: 3.5 K/UL
NEUTROPHILS NFR BLD: 49.9 %
PLATELET # BLD AUTO: 308 K/UL
PMV BLD AUTO: 9.1 FL
POTASSIUM SERPL-SCNC: 3.5 MMOL/L
PROT SERPL-MCNC: 7.5 G/DL
RBC # BLD AUTO: 3.76 M/UL
SODIUM SERPL-SCNC: 140 MMOL/L
TROPONIN I SERPL DL<=0.01 NG/ML-MCNC: <0.006 NG/ML
WBC # BLD AUTO: 7.1 K/UL

## 2017-11-13 PROCEDURE — 93010 ELECTROCARDIOGRAM REPORT: CPT | Mod: ,,, | Performed by: INTERNAL MEDICINE

## 2017-11-13 PROCEDURE — 99284 EMERGENCY DEPT VISIT MOD MDM: CPT | Mod: 25

## 2017-11-13 PROCEDURE — 93005 ELECTROCARDIOGRAM TRACING: CPT

## 2017-11-13 PROCEDURE — 80053 COMPREHEN METABOLIC PANEL: CPT

## 2017-11-13 PROCEDURE — 85025 COMPLETE CBC W/AUTO DIFF WBC: CPT

## 2017-11-13 PROCEDURE — 84484 ASSAY OF TROPONIN QUANT: CPT

## 2017-11-13 PROCEDURE — 96372 THER/PROPH/DIAG INJ SC/IM: CPT | Mod: 59

## 2017-11-13 PROCEDURE — 63600175 PHARM REV CODE 636 W HCPCS: Performed by: NURSE PRACTITIONER

## 2017-11-13 PROCEDURE — 96374 THER/PROPH/DIAG INJ IV PUSH: CPT

## 2017-11-13 RX ORDER — ORPHENADRINE CITRATE 30 MG/ML
60 INJECTION INTRAMUSCULAR; INTRAVENOUS
Status: COMPLETED | OUTPATIENT
Start: 2017-11-13 | End: 2017-11-13

## 2017-11-13 RX ORDER — KETOROLAC TROMETHAMINE 30 MG/ML
30 INJECTION, SOLUTION INTRAMUSCULAR; INTRAVENOUS
Status: COMPLETED | OUTPATIENT
Start: 2017-11-13 | End: 2017-11-13

## 2017-11-13 RX ORDER — NAPROXEN 500 MG/1
500 TABLET ORAL 2 TIMES DAILY WITH MEALS
Qty: 14 TABLET | Refills: 0 | Status: SHIPPED | OUTPATIENT
Start: 2017-11-13 | End: 2018-09-06

## 2017-11-13 RX ORDER — METHOCARBAMOL 500 MG/1
1000 TABLET, FILM COATED ORAL 3 TIMES DAILY
Qty: 30 TABLET | Refills: 0 | Status: SHIPPED | OUTPATIENT
Start: 2017-11-13 | End: 2017-11-18

## 2017-11-13 RX ORDER — KETOROLAC TROMETHAMINE 30 MG/ML
INJECTION, SOLUTION INTRAMUSCULAR; INTRAVENOUS
Status: DISPENSED
Start: 2017-11-13 | End: 2017-11-14

## 2017-11-13 RX ADMIN — ORPHENADRINE CITRATE 60 MG: 30 INJECTION INTRAMUSCULAR; INTRAVENOUS at 08:11

## 2017-11-13 RX ADMIN — KETOROLAC TROMETHAMINE 30 MG: 30 INJECTION, SOLUTION INTRAMUSCULAR at 07:11

## 2017-11-14 NOTE — ED TRIAGE NOTES
Pt presents to ED with c/o midsternal chest pain that began at 11am today. Pt also reports right arm numbness that lasted 20 minutes. Tender on palpation and more painful with movement. States that she vomited x1 PTA. Denies hx of heart problems, admits to family heart hx.

## 2017-11-14 NOTE — ED PROVIDER NOTES
"Encounter Date: 11/13/2017       History     Chief Complaint   Patient presents with    Chest Pain     midsternal; denies radiation; constant heavy sharp pain; states had right arm tingling at 1100 and resolved 20 minutes later;      46yo female presents to the ED for CP since 1100 today.  Pt states that she noticed substernal chest pain while she was on the phone.  No radiation of pain.  Pain is substernal, "heavy", and worse with movement.  She has tried nothing for her pain.  She has never had a cardiac workup in the past.  No trauma, fever/chills, SOB, abd pain, diaphoresis, or rash.  She vomited once after coughing today.  She has only coughed a few times today- nonproductive.  She reports that her grandmother had cardiac disease, but no other family members.       The history is provided by the patient.   Chest Pain   Illness onset: 11am today. Chest pain occurs constantly. The chest pain is unchanged. Associated with: movement. At its most intense, the chest pain is at 9/10. The chest pain is currently at 9/10. The quality of the pain is described as heavy. The pain does not radiate. Chest pain is worsened by certain positions (movement). Primary symptoms include vomiting (once today). Pertinent negatives for primary symptoms include no fever, no syncope, no shortness of breath, no cough, no wheezing, no palpitations, no abdominal pain, no nausea and no dizziness.   The vomiting began today. Vomiting occurred once. The emesis contains stomach contents.   Pertinent negatives for associated symptoms include no claudication, no diaphoresis, no lower extremity edema, no near-syncope, no numbness, no orthopnea and no paroxysmal nocturnal dyspnea. She tried nothing for the symptoms.   Pertinent negatives for past medical history include no arrhythmia, no CAD, no COPD, no diabetes, no DVT and no hypertension.   Her family medical history is significant for CAD.     Review of patient's allergies indicates:  No Known " Allergies  History reviewed. No pertinent past medical history.  Past Surgical History:   Procedure Laterality Date    TUBAL LIGATION       History reviewed. No pertinent family history.  Social History   Substance Use Topics    Smoking status: Never Smoker    Smokeless tobacco: Never Used    Alcohol use Yes      Comment: social     Review of Systems   Constitutional: Negative for appetite change, diaphoresis and fever.   HENT: Negative for congestion.    Respiratory: Negative for cough, shortness of breath and wheezing.    Cardiovascular: Positive for chest pain. Negative for palpitations, orthopnea, claudication, syncope and near-syncope.   Gastrointestinal: Positive for vomiting (once today). Negative for abdominal pain and nausea.   Musculoskeletal: Negative for back pain.   Skin: Negative for rash.   Neurological: Negative for dizziness and numbness.   Psychiatric/Behavioral: Negative for confusion.       Physical Exam     Initial Vitals [11/13/17 1907]   BP Pulse Resp Temp SpO2   (!) 147/79 68 16 98.5 °F (36.9 °C) 99 %      MAP       101.67         Physical Exam    Nursing note and vitals reviewed.  Constitutional: Vital signs are normal. She appears well-developed and well-nourished. She is active and cooperative.  Non-toxic appearance. She does not have a sickly appearance. She does not appear ill.   HENT:   Head: Normocephalic and atraumatic.   Eyes: Conjunctivae and EOM are normal.   Neck: Normal range of motion. Neck supple.   Cardiovascular: Normal rate, regular rhythm and normal heart sounds.   Pulses:       Radial pulses are 2+ on the right side, and 2+ on the left side.   Pulmonary/Chest: Effort normal and breath sounds normal. She exhibits tenderness. She exhibits no bony tenderness, no crepitus and no swelling.       Abdominal: Soft. Normal appearance and bowel sounds are normal. She exhibits no distension. There is no tenderness. There is no rigidity, no rebound, no guarding and no CVA  tenderness.   Neurological: She is alert and oriented to person, place, and time. She has normal strength. GCS eye subscore is 4. GCS verbal subscore is 5. GCS motor subscore is 6.   Skin: Skin is warm, dry and intact. No bruising and no rash noted. No erythema.   Psychiatric: She has a normal mood and affect. Her speech is normal and behavior is normal. Judgment and thought content normal. Cognition and memory are normal.         ED Course   Procedures  Labs Reviewed   CBC W/ AUTO DIFFERENTIAL - Abnormal; Notable for the following:        Result Value    RBC 3.76 (*)     Hemoglobin 9.5 (*)     Hematocrit 30.3 (*)     MCV 81 (*)     MCH 25.3 (*)     MCHC 31.4 (*)     MPV 9.1 (*)     All other components within normal limits   COMPREHENSIVE METABOLIC PANEL - Abnormal; Notable for the following:     Albumin 3.4 (*)     ALT 8 (*)     Anion Gap 7 (*)     All other components within normal limits   TROPONIN I         Imaging Results          X-Ray Chest 1 View (Final result)  Result time 11/13/17 20:11:17    Final result by Hilario Hill MD (11/13/17 20:11:17)                 Impression:        No radiographic acute intrathoracic process seen on this single view.      Electronically signed by: HILARIO HILL MD, MD  Date:     11/13/17  Time:    20:11              Narrative:    COMPARISON: Chest radiograph 1/20/17    FINDINGS: Single frontal chest radiograph. Monitoring leads overlie the chest. No detrimental change. The bilateral lungs are well expanded without large consolidation.  No large pleural effusion or pneumothorax.  The heart and mediastinal contours are within normal limits for age.  No acute osseous process seen.   Right upper quadrant surgical clips noted. PA and lateral views can be obtained.                                   Medical Decision Making:   Initial Assessment:   47-year-old -American female here for chest pain since 11:00 this morning.  Pain is worse with movement.  She has vomited once  after coughing.  She reports a nonproductive cough.  No shortness of breath.  No abdominal pain.  Patient appears well, nontoxic.  Vital stable.  Heart rate regular rate and rhythm.  Lungs clear to auscultation and equal bilaterally.  There is reproducible anterior chest wall pain.  No crepitus, swelling, or signs of trauma.  No rash.  Differential Diagnosis:   Chest wall pain, STEMI, non-STEMI, arrhythmia, pneumothorax, pleural effusion  Clinical Tests:   Lab Tests: Reviewed and Ordered  The following lab test(s) were unremarkable: CBC, CMP and Troponin  Radiological Study: Ordered and Reviewed  Medical Tests: Ordered and Reviewed  ED Management:  Labs, EKG, CXR, IV toradol  CXR read by radiologist and reviewed by me-negative for acute change.  Troponin negative, and onset of pain was over 8 hours ago.  PERC negative.  I have a low suspicion for ACS.  I feel the pt's symptoms are due to chest wall pain.  Pt to follow up with PCP or [facility] within 2 days.  I reviewed strict return precautions. In addition, pt is to return to the ED if condition changes, progresses, or if there are any concerns.  Pt verbalized understanding, compliance, and agreement with the treatment plan.    RX Naprosyn and Robaxin              Attending Attestation:     Physician Attestation Statement for NP/PA:   I discussed this assessment and plan of this patient with the NP/PA, but I did not personally examine the patient. The face to face encounter was performed by the NP/PA.                  ED Course      Clinical Impression:   The primary encounter diagnosis was Chest wall pain. A diagnosis of Chest pain was also pertinent to this visit.                           ADRIA Fonseca  11/13/17 2028       Shay Jon MD  11/13/17 2045

## 2018-01-12 ENCOUNTER — HOSPITAL ENCOUNTER (EMERGENCY)
Facility: HOSPITAL | Age: 48
Discharge: HOME | End: 2018-01-12
Attending: EMERGENCY MEDICINE
Payer: COMMERCIAL

## 2018-01-12 VITALS
HEART RATE: 74 BPM | TEMPERATURE: 99 F | DIASTOLIC BLOOD PRESSURE: 72 MMHG | OXYGEN SATURATION: 100 % | BODY MASS INDEX: 28.12 KG/M2 | HEIGHT: 66 IN | WEIGHT: 175 LBS | RESPIRATION RATE: 16 BRPM | SYSTOLIC BLOOD PRESSURE: 128 MMHG

## 2018-01-12 DIAGNOSIS — R68.89 FLU-LIKE SYMPTOMS: Primary | ICD-10-CM

## 2018-01-12 PROCEDURE — 99283 EMERGENCY DEPT VISIT LOW MDM: CPT

## 2018-01-12 RX ORDER — OSELTAMIVIR PHOSPHATE 75 MG/1
75 CAPSULE ORAL 2 TIMES DAILY
Qty: 10 CAPSULE | Refills: 0 | Status: SHIPPED | OUTPATIENT
Start: 2018-01-12 | End: 2018-01-17

## 2018-01-12 RX ORDER — BENZONATATE 100 MG/1
100 CAPSULE ORAL 3 TIMES DAILY PRN
Qty: 20 CAPSULE | Refills: 0 | Status: SHIPPED | OUTPATIENT
Start: 2018-01-12 | End: 2018-01-22

## 2018-01-12 RX ORDER — PROMETHAZINE HYDROCHLORIDE AND CODEINE PHOSPHATE 6.25; 1 MG/5ML; MG/5ML
5 SOLUTION ORAL EVERY 6 HOURS PRN
Qty: 120 ML | Refills: 0 | Status: SHIPPED | OUTPATIENT
Start: 2018-01-12 | End: 2018-01-22

## 2018-01-12 NOTE — ED NOTES
Pt reports having flu like symptoms x 4 days. Pt reports, fever, cough, and body aches. States that her mom was dx with influenza. Pt is alert, age appropriate and in no acute distress. Respirations are even and unlabored. Bilateral breath sounds are clear throughout chest. abd is soft, not tender and not distended. Pt denies change in feeding, bowel or bladder habits. Skin is warm and color is appropriate for ethnicity. Pt moves all extremities well. Pt is dressed appropriately and well groomed.

## 2018-01-12 NOTE — ED PROVIDER NOTES
Encounter Date: 1/12/2018    SCRIBE #1 NOTE: I, Alisonkehinde Cha, am scribing for, and in the presence of, Dr. Wallace.       History     Chief Complaint   Patient presents with    Generalized Body Aches     body aches, fever, congestion, dry cough x 4 days     This is a 47 y.o. female who has no past medical history on file.   The patient presents to the Emergency Department with generalized body aches onset 4 days ago.   Symptoms are associated with fever, congestion, dry cough.  The patient has had ill contact with her mother who was diagnosed with the flu.  Pt has a past surgical history that includes Tubal ligation.       The history is provided by the patient.     Review of patient's allergies indicates:  No Known Allergies  History reviewed. No pertinent past medical history.  Past Surgical History:   Procedure Laterality Date    TUBAL LIGATION       History reviewed. No pertinent family history.  Social History   Substance Use Topics    Smoking status: Never Smoker    Smokeless tobacco: Never Used    Alcohol use Yes      Comment: social     Review of Systems   Constitutional: Positive for fever. Negative for activity change and fatigue.   HENT: Positive for congestion.    Respiratory: Positive for cough. Negative for shortness of breath.    Cardiovascular: Negative for chest pain.   Gastrointestinal: Negative for abdominal pain, diarrhea, nausea and vomiting.   Genitourinary: Negative for difficulty urinating and dysuria.   Musculoskeletal: Positive for myalgias. Negative for back pain.   Skin: Negative for rash and wound.   Neurological: Negative for dizziness, weakness and headaches.   Psychiatric/Behavioral: Negative for decreased concentration and dysphoric mood.       Physical Exam     Initial Vitals [01/12/18 1508]   BP Pulse Resp Temp SpO2   128/72 74 16 99.3 °F (37.4 °C) 100 %      MAP       90.67         Physical Exam    Nursing note and vitals reviewed.  Constitutional: She appears well-developed  and well-nourished. No distress.   HENT:   Head: Normocephalic and atraumatic.   Mild erythema to posterior pharynx    Eyes: Conjunctivae are normal. Pupils are equal, round, and reactive to light.   Neck: Normal range of motion. Neck supple.   Cardiovascular: Normal rate, regular rhythm and normal heart sounds.   Pulmonary/Chest: Breath sounds normal. No respiratory distress. She has no wheezes. She has no rhonchi. She has no rales.   Dry cough   Abdominal: Soft. Bowel sounds are normal. She exhibits no distension. There is no tenderness.   Musculoskeletal: Normal range of motion. She exhibits no edema or tenderness.   Lymphadenopathy:     She has no cervical adenopathy.   Neurological: She is alert and oriented to person, place, and time.   Skin: Skin is warm and dry. Capillary refill takes less than 2 seconds. No rash noted. No erythema.   Psychiatric: She has a normal mood and affect. Thought content normal.         ED Course   Procedures  Labs Reviewed - No data to display          Medical Decision Making:   Initial Assessment:   This is an emergent evaluation of a 47 y.o.female patient with presentation of flu like symptoms with contact positive with the flu.   Initial differentials include but are not limited to: influenza.   Plan: treat with Tamilflu, phenergan/codeine at night, Tessalon Perles during the day.                   ED Course      Clinical Impression:     1. Flu-like symptoms         Disposition:   Disposition: Discharged  Condition: Stable          Scribe attestation: I, Dr. Isidro Wallace, personally performed the services described in this documentation.   All medical record entries made by the scribe were at my direction and in my presence.   I have reviewed the chart and agree that the record is accurate and complete.   Isidro Wallace MD.                Isidro Wallace MD  01/13/18 1571

## 2018-02-07 ENCOUNTER — HOSPITAL ENCOUNTER (EMERGENCY)
Facility: HOSPITAL | Age: 48
Discharge: HOME | End: 2018-02-07
Attending: EMERGENCY MEDICINE
Payer: COMMERCIAL

## 2018-02-07 VITALS
HEART RATE: 61 BPM | HEIGHT: 66 IN | RESPIRATION RATE: 18 BRPM | TEMPERATURE: 98 F | SYSTOLIC BLOOD PRESSURE: 117 MMHG | OXYGEN SATURATION: 99 % | BODY MASS INDEX: 28.12 KG/M2 | DIASTOLIC BLOOD PRESSURE: 74 MMHG | WEIGHT: 175 LBS

## 2018-02-07 DIAGNOSIS — M25.579 ANKLE PAIN: ICD-10-CM

## 2018-02-07 DIAGNOSIS — S93.402A SPRAIN OF LEFT ANKLE, UNSPECIFIED LIGAMENT, INITIAL ENCOUNTER: Primary | ICD-10-CM

## 2018-02-07 PROCEDURE — 99283 EMERGENCY DEPT VISIT LOW MDM: CPT | Mod: 25

## 2018-02-07 PROCEDURE — 63600175 PHARM REV CODE 636 W HCPCS: Performed by: PHYSICIAN ASSISTANT

## 2018-02-07 PROCEDURE — 96372 THER/PROPH/DIAG INJ SC/IM: CPT

## 2018-02-07 RX ORDER — ETODOLAC 200 MG/1
200 CAPSULE ORAL 3 TIMES DAILY PRN
Qty: 20 CAPSULE | Refills: 0 | Status: SHIPPED | OUTPATIENT
Start: 2018-02-07 | End: 2018-02-14

## 2018-02-07 RX ORDER — KETOROLAC TROMETHAMINE 30 MG/ML
15 INJECTION, SOLUTION INTRAMUSCULAR; INTRAVENOUS
Status: COMPLETED | OUTPATIENT
Start: 2018-02-07 | End: 2018-02-07

## 2018-02-07 RX ADMIN — KETOROLAC TROMETHAMINE 15 MG: 30 INJECTION, SOLUTION INTRAMUSCULAR at 03:02

## 2018-02-07 NOTE — DISCHARGE INSTRUCTIONS
Follow attached instructions to [facility].     Take Lodine as prescribed for pain. Follow up with primary care in 2 days.     Return to ER for worsening symptoms, new symptoms or as needed.

## 2018-02-07 NOTE — ED PROVIDER NOTES
"Encounter Date: 2/7/2018    SCRIBE #1 NOTE: I, Cordell Malone, am scribing for, and in the presence of,  Heena Del Cid PA-C. I have scribed the following portions of the note - Other sections scribed: HPI and ROS.       History     Chief Complaint   Patient presents with    Ankle Pain     + swelling; pt states "my anle started hurting at work yesterday"     CC: Ankle Pain     HPI: This 47 y.o F with no pertinent PMHx presents to the ED c/o acute onset of constant and severe (10/10) L ankle pain x3 days. The pt reports she twisted her ankle yesterday and work which resulted in worsening pain with associated swelling and left lower leg tingling and numbness yesterday PM. The pt's pain is worse with ROM and when standing. The pt reports a previous episode of similar symptoms 4 years ago, and reports intermittent episodes of less severe left ankle pain. The pt notes she stands all day at work. The pt denies chest pain, SOB, bruising, redness, leg swelling, recent long travel and recent trauma. The pt attempted tx with ice, ibuprofen and warm soaks which relieved the swelling.       The history is provided by the patient. No  was used.     Review of patient's allergies indicates:  No Known Allergies  History reviewed. No pertinent past medical history.  Past Surgical History:   Procedure Laterality Date    HYSTERECTOMY      TUBAL LIGATION       History reviewed. No pertinent family history.  Social History   Substance Use Topics    Smoking status: Never Smoker    Smokeless tobacco: Never Used    Alcohol use Yes      Comment: social     Review of Systems   Constitutional: Negative for chills, diaphoresis and fever.   HENT: Negative for rhinorrhea and sore throat.    Eyes: Negative for redness.   Respiratory: Negative for cough and shortness of breath.    Cardiovascular: Negative for chest pain.   Gastrointestinal: Negative for abdominal pain, diarrhea, nausea and vomiting.   Genitourinary: " Negative for dysuria, frequency and urgency.   Musculoskeletal: Negative for back pain and neck pain.        (+) left ankle pain  (+) left ankle swelling   Skin: Negative for rash.   Neurological: Positive for numbness (L lower leg).   Psychiatric/Behavioral: The patient is not nervous/anxious.        Physical Exam     Initial Vitals [02/07/18 1241]   BP Pulse Resp Temp SpO2   115/71 75 18 98.8 °F (37.1 °C) --      MAP       85.67         Physical Exam    Nursing note and vitals reviewed.  Constitutional: She appears well-developed and well-nourished.   HENT:   Head: Normocephalic.   Right Ear: External ear normal.   Left Ear: External ear normal.   Eyes: Conjunctivae are normal.   Neck: Normal range of motion.   Cardiovascular: Normal rate and regular rhythm. Exam reveals no gallop and no friction rub.    No murmur heard.  Pulses:       Dorsalis pedis pulses are 2+ on the right side, and 2+ on the left side.   Pulmonary/Chest: Breath sounds normal. She has no wheezes. She has no rhonchi. She has no rales.   Musculoskeletal:   Moderate swelling to L lateral malleolus with TTP over lateral and medial malleolus. No ecchymosis or erythema. Limited plantarflexion and dorsiflexion due to pain.     No popliteal or calf swelling or TTP   Neurological: She is alert. No sensory deficit.         ED Course   Procedures  Labs Reviewed - No data to display          Medical Decision Making:   Initial Assessment:    Patent is a 47-year-old female who presents for evaluation of 3 day history of left ankle pain and swelling after twisting her ankle at work. Pt does report numbness and tingling radiating up to her knee. No calf or popliteal pain or swelling. patient is afebrile, well-appearing in distress.  Exam findings as detailed above.    neurovascular deficit on exam. Xrays negative for fracture dislocation.  Toradol given in ED.  Ace wrap applied.  Crutches provided.  Think this is ankle sprain.  Doubt septic arthritis,  cellulitis. Instructed patient follow up with primary care in 2 days return to the ER for worsening symptoms, fever or worsening pain or swelling , nausea, vomiting or as needed.  I discussed this patient with Dr. Montes who agrees with assessment and plan.                Scribe Attestation:   Scribe #1: I performed the above scribed service and the documentation accurately describes the services I performed. I attest to the accuracy of the note.    Attending Attestation:     Physician Attestation Statement for NP/PA:   I discussed this assessment and plan of this patient with the NP/PA, but I did not personally examine the patient. The face to face encounter was performed by the NP/PA.        Physician Attestation for Scribe:  Physician Attestation Statement for Scribe #1: I, Heena Del Cid PA-C, reviewed documentation, as scribed by Cordell Malone in my presence, and it is both accurate and complete.                 ED Course      Clinical Impression:   The primary encounter diagnosis was Sprain of left ankle, unspecified ligament, initial encounter. A diagnosis of Ankle pain was also pertinent to this visit.                           Heena Del Cid PA-C  02/07/18 5738       Mason Montes MD  02/08/18 4916

## 2018-05-11 ENCOUNTER — OFFICE VISIT (OUTPATIENT)
Dept: FAMILY MEDICINE | Facility: CLINIC | Age: 48
End: 2018-05-11
Payer: COMMERCIAL

## 2018-05-11 VITALS
HEART RATE: 74 BPM | WEIGHT: 195.56 LBS | OXYGEN SATURATION: 99 % | RESPIRATION RATE: 17 BRPM | SYSTOLIC BLOOD PRESSURE: 118 MMHG | DIASTOLIC BLOOD PRESSURE: 78 MMHG | TEMPERATURE: 98 F | BODY MASS INDEX: 31.43 KG/M2 | HEIGHT: 66 IN

## 2018-05-11 DIAGNOSIS — R10.13 EPIGASTRIC PAIN: ICD-10-CM

## 2018-05-11 DIAGNOSIS — J30.89 NON-SEASONAL ALLERGIC RHINITIS, UNSPECIFIED TRIGGER: ICD-10-CM

## 2018-05-11 DIAGNOSIS — K64.8 INTERNAL HEMORRHOID, BLEEDING: ICD-10-CM

## 2018-05-11 DIAGNOSIS — B37.31 YEAST INFECTION OF THE VAGINA: ICD-10-CM

## 2018-05-11 DIAGNOSIS — Z00.00 WELL ADULT EXAM: Primary | ICD-10-CM

## 2018-05-11 PROCEDURE — 99386 PREV VISIT NEW AGE 40-64: CPT | Mod: S$GLB,,, | Performed by: FAMILY MEDICINE

## 2018-05-11 PROCEDURE — 99999 PR PBB SHADOW E&M-EST. PATIENT-LVL III: CPT | Mod: PBBFAC,,, | Performed by: FAMILY MEDICINE

## 2018-05-11 RX ORDER — FLUCONAZOLE 150 MG/1
150 TABLET ORAL ONCE
Qty: 1 TABLET | Refills: 0 | Status: SHIPPED | OUTPATIENT
Start: 2018-05-11 | End: 2018-05-11

## 2018-05-11 RX ORDER — FLUTICASONE PROPIONATE 50 MCG
1 SPRAY, SUSPENSION (ML) NASAL 2 TIMES DAILY PRN
Qty: 15 G | Refills: 2 | Status: SHIPPED | OUTPATIENT
Start: 2018-05-11 | End: 2019-03-15

## 2018-05-11 RX ORDER — PANTOPRAZOLE SODIUM 40 MG/1
40 TABLET, DELAYED RELEASE ORAL DAILY
Qty: 30 TABLET | Refills: 1 | Status: SHIPPED | OUTPATIENT
Start: 2018-05-11 | End: 2018-07-27

## 2018-05-11 RX ORDER — CETIRIZINE HYDROCHLORIDE 10 MG/1
10 TABLET ORAL DAILY
Qty: 30 TABLET | Refills: 0 | Status: CANCELLED | OUTPATIENT
Start: 2018-05-11 | End: 2019-05-11

## 2018-05-11 NOTE — PROGRESS NOTES
Chief Complaint   Patient presents with    Abdominal Pain     stared couples months ago     Rectal Bleeding    Vaginal Discharge       Leni Henson is a 47 y.o. female who presents to [facility] care.  Chronic medical issues, if present, have been documented.  Acute medical issues, if present have been documented in the Chief Complaint.     Abdominal Pain   This is a chronic problem. The current episode started more than 1 month ago. The onset quality is gradual. The problem has been unchanged. The pain is located in the epigastric region and RUQ. The pain is at a severity of 5/10. The pain is moderate. The quality of the pain is sharp. The abdominal pain does not radiate. Pertinent negatives include no anorexia, arthralgias, belching, constipation, diarrhea, dysuria, fever, flatus, frequency, headaches, hematochezia, hematuria, melena, myalgias, nausea, vomiting or weight loss. The pain is aggravated by eating. The pain is relieved by being still. She has tried nothing for the symptoms. There is no history of abdominal surgery, colon cancer, Crohn's disease, gallstones, GERD, irritable bowel syndrome, pancreatitis, PUD or ulcerative colitis. Patient's medical history does not include kidney stones and UTI.   Rectal Bleeding   Associated symptoms include abdominal pain. Pertinent negatives include no anorexia, arthralgias, chest pain, chills, congestion, coughing, fatigue, fever, headaches, myalgias, nausea, neck pain, rash, sore throat, vomiting or weakness.   Vaginal Discharge   The patient's primary symptoms include vaginal discharge. The patient's pertinent negatives include no pelvic pain. Associated symptoms include abdominal pain. Pertinent negatives include no anorexia, chills, constipation, diarrhea, dysuria, fever, flank pain, frequency, headaches, hematuria, nausea, rash, sore throat, urgency or vomiting. There is no history of an abdominal surgery.       ROS  Review of Systems   Constitutional:  "Negative.  Negative for activity change, appetite change, chills, fatigue, fever and weight loss.   HENT: Negative for congestion, ear pain, hearing loss, postnasal drip, rhinorrhea, sinus pressure, sore throat and trouble swallowing.    Eyes: Negative.  Negative for pain and visual disturbance.   Respiratory: Negative for cough, chest tightness and shortness of breath.    Cardiovascular: Negative for chest pain and leg swelling.   Gastrointestinal: Positive for abdominal pain. Negative for anorexia, constipation, diarrhea, flatus, hematochezia, melena, nausea and vomiting.   Endocrine: Negative.    Genitourinary: Positive for vaginal discharge. Negative for difficulty urinating, dysuria, flank pain, frequency, hematuria, menstrual problem, pelvic pain and urgency.   Musculoskeletal: Negative.  Negative for arthralgias, gait problem, myalgias, neck pain and neck stiffness.   Skin: Negative.  Negative for rash.   Allergic/Immunologic: Negative.  Negative for environmental allergies, food allergies and immunocompromised state.   Neurological: Negative.  Negative for weakness, light-headedness and headaches.   Hematological: Negative.    Psychiatric/Behavioral: Negative.  Negative for decreased concentration, dysphoric mood and sleep disturbance. The patient is not nervous/anxious.        Physical Exam  Vitals:    05/11/18 1419   BP: 118/78   Pulse: 74   Resp: 17   Temp: 97.9 °F (36.6 °C)    Body mass index is 31.56 kg/m².  Weight: 88.7 kg (195 lb 8.8 oz)   Height: 5' 6" (167.6 cm)     Physical Exam   Constitutional: She is oriented to person, place, and time. She appears well-developed and well-nourished. She is active and cooperative.  Non-toxic appearance. She does not have a sickly appearance. She does not appear ill. No distress.   HENT:   Head: Normocephalic and atraumatic.   Right Ear: Hearing, tympanic membrane, external ear and ear canal normal. No tenderness. No foreign bodies. Tympanic membrane is not " injected, not scarred, not perforated, not erythematous, not retracted and not bulging. No decreased hearing is noted.   Left Ear: Hearing, tympanic membrane, external ear and ear canal normal. No tenderness. No foreign bodies. Tympanic membrane is not injected, not scarred, not perforated, not erythematous, not retracted and not bulging. No decreased hearing is noted.   Nose: Nose normal. No rhinorrhea or nasal deformity.   Mouth/Throat: Uvula is midline, oropharynx is clear and moist and mucous membranes are normal. She does not have dentures. No dental caries.   Eyes: Conjunctivae, EOM and lids are normal. Pupils are equal, round, and reactive to light. Right eye exhibits no chemosis, no discharge and no exudate. No foreign body present in the right eye. Left eye exhibits no chemosis, no discharge and no exudate. No foreign body present in the left eye. No scleral icterus.   Neck: Normal range of motion and full passive range of motion without pain. Neck supple. No thyroid mass and no thyromegaly present.   Cardiovascular: Normal rate, regular rhythm, S1 normal, S2 normal and normal heart sounds.  Exam reveals no gallop and no friction rub.    No murmur heard.  Pulmonary/Chest: Effort normal. She has no decreased breath sounds. She has no wheezes. She has no rhonchi. She has no rales. She exhibits no mass, no tenderness and no deformity.   Abdominal: Soft. Normal appearance and bowel sounds are normal. She exhibits no distension, no ascites and no mass. There is no hepatosplenomegaly. There is tenderness in the epigastric area. There is no rigidity, no rebound and no guarding.       Musculoskeletal: Normal range of motion.   Lymphadenopathy:        Head (right side): No submental, no submandibular, no tonsillar, no preauricular and no posterior auricular adenopathy present.        Head (left side): No submental, no submandibular, no tonsillar, no preauricular and no posterior auricular adenopathy present.     She  has no cervical adenopathy.   Neurological: She is alert and oriented to person, place, and time. She has normal strength. No cranial nerve deficit or sensory deficit. She exhibits normal muscle tone. She displays no seizure activity.   Skin: Skin is warm, dry and intact. No rash noted. She is not diaphoretic. No pallor.   Psychiatric: She has a normal mood and affect. Her speech is normal and behavior is normal. Judgment and thought content normal. Cognition and memory are normal. She is attentive.   Vitals reviewed.      Assessment & Plan    1. Well adult exam  Discussed age and gender appropriate screenings at this visit and encouraged a healthy diet with low saturated fats, and increased physical activity.  Counseled on medically appropriate vaccines based on age and current health condition.  Screening test will be ordered and once completed, patient will be notified of results when available.  If necessary, will follow up to discuss and manage further.   - CBC auto differential; Future  - Comprehensive metabolic panel; Future  - Lipid panel; Future  - TSH; Future  - T4, free; Future    2. Epigastric pain  Discussed possible causes, including gastric ulcer, gallstones, or hiatal hernia.  Will start PPI to alleviate symptoms and consider imaging if symptoms not improving.  - pantoprazole (PROTONIX) 40 MG tablet; Take 1 tablet (40 mg total) by mouth once daily.  Dispense: 30 tablet; Refill: 1    3. Internal hemorrhoid, bleeding  Advised bleeding is likely benign; recommended OTC steroid cream to alleviate symptoms.    4. Yeast infection of the vagina  Will treat with oral antifungal to alleviate or prevent symptoms of yeast infection.  Patient advised to use once to resolve current symptoms or use after antibiotic use to prevent symptoms.   - fluconazole (DIFLUCAN) 150 MG Tab; Take 1 tablet (150 mg total) by mouth once.  Dispense: 1 tablet; Refill: 0    5. Non-seasonal allergic rhinitis, unspecified trigger  The  current medical regimen is effective at this time and no changes to present plan or medications will be made at this visit.     - fluticasone (FLONASE) 50 mcg/actuation nasal spray; 1 spray (50 mcg total) by Each Nare route 2 (two) times daily as needed.  Dispense: 15 g; Refill: 2      Follow up documented    ACTIVE MEDICAL ISSUES:  Documented in Problem List    PAST MEDICAL HISTORY  Documented  .  PAST SURGICAL HISTORY:  Documented    SOCIAL HISTORY:  Documented    FAMILY HISTORY:  Documented    ALLERGIES AND MEDICATIONS: updated and reviewed.  Documented    Health Maintenance    Patient has no pending health maintenance at this time

## 2018-05-14 ENCOUNTER — LAB VISIT (OUTPATIENT)
Dept: LAB | Facility: HOSPITAL | Age: 48
End: 2018-05-14
Attending: FAMILY MEDICINE
Payer: COMMERCIAL

## 2018-05-14 DIAGNOSIS — Z00.00 WELL ADULT EXAM: ICD-10-CM

## 2018-05-14 LAB
ALBUMIN SERPL BCP-MCNC: 3.7 G/DL
ALP SERPL-CCNC: 80 U/L
ALT SERPL W/O P-5'-P-CCNC: 7 U/L
ANION GAP SERPL CALC-SCNC: 8 MMOL/L
AST SERPL-CCNC: 13 U/L
BASOPHILS # BLD AUTO: 0.02 K/UL
BASOPHILS NFR BLD: 0.3 %
BILIRUB SERPL-MCNC: 0.4 MG/DL
BUN SERPL-MCNC: 13 MG/DL
CALCIUM SERPL-MCNC: 8.9 MG/DL
CHLORIDE SERPL-SCNC: 104 MMOL/L
CHOLEST SERPL-MCNC: 174 MG/DL
CHOLEST/HDLC SERPL: 3.2 {RATIO}
CO2 SERPL-SCNC: 24 MMOL/L
CREAT SERPL-MCNC: 0.7 MG/DL
DIFFERENTIAL METHOD: ABNORMAL
EOSINOPHIL # BLD AUTO: 0.2 K/UL
EOSINOPHIL NFR BLD: 2.3 %
ERYTHROCYTE [DISTWIDTH] IN BLOOD BY AUTOMATED COUNT: 16.7 %
EST. GFR  (AFRICAN AMERICAN): >60 ML/MIN/1.73 M^2
EST. GFR  (NON AFRICAN AMERICAN): >60 ML/MIN/1.73 M^2
GLUCOSE SERPL-MCNC: 90 MG/DL
HCT VFR BLD AUTO: 26.7 %
HDLC SERPL-MCNC: 54 MG/DL
HDLC SERPL: 31 %
HGB BLD-MCNC: 7.8 G/DL
IMM GRANULOCYTES # BLD AUTO: 0.01 K/UL
IMM GRANULOCYTES NFR BLD AUTO: 0.2 %
LDLC SERPL CALC-MCNC: 108.2 MG/DL
LYMPHOCYTES # BLD AUTO: 2.8 K/UL
LYMPHOCYTES NFR BLD: 42.1 %
MCH RBC QN AUTO: 21.3 PG
MCHC RBC AUTO-ENTMCNC: 29.2 G/DL
MCV RBC AUTO: 73 FL
MONOCYTES # BLD AUTO: 0.7 K/UL
MONOCYTES NFR BLD: 10.7 %
NEUTROPHILS # BLD AUTO: 3 K/UL
NEUTROPHILS NFR BLD: 44.4 %
NONHDLC SERPL-MCNC: 120 MG/DL
NRBC BLD-RTO: 0 /100 WBC
PLATELET # BLD AUTO: 271 K/UL
PMV BLD AUTO: 10.2 FL
POTASSIUM SERPL-SCNC: 3.4 MMOL/L
PROT SERPL-MCNC: 7.6 G/DL
RBC # BLD AUTO: 3.67 M/UL
SODIUM SERPL-SCNC: 136 MMOL/L
T4 FREE SERPL-MCNC: 1.08 NG/DL
TRIGL SERPL-MCNC: 59 MG/DL
TSH SERPL DL<=0.005 MIU/L-ACNC: 0.61 UIU/ML
WBC # BLD AUTO: 6.63 K/UL

## 2018-05-14 PROCEDURE — 80053 COMPREHEN METABOLIC PANEL: CPT

## 2018-05-14 PROCEDURE — 84443 ASSAY THYROID STIM HORMONE: CPT

## 2018-05-14 PROCEDURE — 36415 COLL VENOUS BLD VENIPUNCTURE: CPT | Mod: PO

## 2018-05-14 PROCEDURE — 80061 LIPID PANEL: CPT

## 2018-05-14 PROCEDURE — 85025 COMPLETE CBC W/AUTO DIFF WBC: CPT

## 2018-05-14 PROCEDURE — 84439 ASSAY OF FREE THYROXINE: CPT

## 2018-05-17 ENCOUNTER — TELEPHONE (OUTPATIENT)
Dept: FAMILY MEDICINE | Facility: CLINIC | Age: 48
End: 2018-05-17

## 2018-05-17 NOTE — PROGRESS NOTES
Please notify patient of severely low hemoglobin and hematocrit results by phone, and schedule follow up  within 1 month.  Advise that if she feels fatigue, or cold, she may need a transfusion.  Advise to start iron therapy twice a day.

## 2018-05-17 NOTE — TELEPHONE ENCOUNTER
Informed pt of results below. Pt is scheduled for appointment 6/18/2018. PT stated that she will  iron tablets from the drugstore.       ----- Message from Azikiwe K. Lombard, MD sent at 5/17/2018  1:40 PM CDT -----  Please notify patient of severely low hemoglobin and hematocrit results by phone, and schedule follow up  within 1 month.  Advise that if she feels fatigue, or cold, she may need a transfusion.  Advise to start iron therapy twice a day.

## 2018-06-18 ENCOUNTER — OFFICE VISIT (OUTPATIENT)
Dept: FAMILY MEDICINE | Facility: CLINIC | Age: 48
End: 2018-06-18
Payer: COMMERCIAL

## 2018-06-18 VITALS
RESPIRATION RATE: 16 BRPM | DIASTOLIC BLOOD PRESSURE: 84 MMHG | TEMPERATURE: 98 F | SYSTOLIC BLOOD PRESSURE: 126 MMHG | HEART RATE: 70 BPM | HEIGHT: 66 IN | BODY MASS INDEX: 31.36 KG/M2 | OXYGEN SATURATION: 99 % | WEIGHT: 195.13 LBS

## 2018-06-18 DIAGNOSIS — Z12.31 ENCOUNTER FOR SCREENING MAMMOGRAM FOR BREAST CANCER: ICD-10-CM

## 2018-06-18 DIAGNOSIS — B37.31 VAGINAL YEAST INFECTION: ICD-10-CM

## 2018-06-18 DIAGNOSIS — D50.9 IRON DEFICIENCY ANEMIA, UNSPECIFIED IRON DEFICIENCY ANEMIA TYPE: Primary | ICD-10-CM

## 2018-06-18 PROCEDURE — 99999 PR PBB SHADOW E&M-EST. PATIENT-LVL IV: CPT | Mod: PBBFAC,,, | Performed by: FAMILY MEDICINE

## 2018-06-18 PROCEDURE — 99214 OFFICE O/P EST MOD 30 MIN: CPT | Mod: S$GLB,,, | Performed by: FAMILY MEDICINE

## 2018-06-18 RX ORDER — FLUCONAZOLE 150 MG/1
150 TABLET ORAL ONCE
Qty: 1 TABLET | Refills: 0 | Status: SHIPPED | OUTPATIENT
Start: 2018-06-18 | End: 2018-06-18

## 2018-06-18 NOTE — PROGRESS NOTES
"Chief Complaint   Patient presents with    Results     follow up to discuss labs results     Vaginal Discharge     still with SX        Leni Henson is a 47 y.o. female who presents per the Chief Complaint.  Pt is known to me and was last seen by me on 5/11/2018.  All known chronic medical issues have been documented.       HPI     ROS  Review of Systems   Constitutional: Positive for chills. Negative for activity change, appetite change, diaphoresis, fatigue, fever and unexpected weight change.   HENT: Negative.  Negative for congestion, ear pain, hearing loss, nosebleeds, postnasal drip, rhinorrhea, sinus pressure, sneezing, sore throat and trouble swallowing.    Eyes: Negative for pain and visual disturbance.   Respiratory: Negative for cough, choking and shortness of breath.    Cardiovascular: Negative for chest pain and leg swelling.   Gastrointestinal: Negative for abdominal pain, constipation, diarrhea, nausea and vomiting.   Endocrine: Positive for cold intolerance.   Genitourinary: Positive for vaginal discharge. Negative for difficulty urinating, dysuria, frequency and urgency.   Musculoskeletal: Negative.  Negative for arthralgias, back pain, gait problem, joint swelling and myalgias.   Skin: Negative.    Allergic/Immunologic: Negative for environmental allergies and food allergies.   Neurological: Negative.  Negative for dizziness, seizures, syncope, weakness, light-headedness and headaches.   Psychiatric/Behavioral: Negative.  Negative for confusion, decreased concentration, dysphoric mood and sleep disturbance. The patient is not nervous/anxious.        Physical Exam  Vitals:    06/18/18 1458   BP: 126/84   Pulse: 70   Resp: 16   Temp: 98.3 °F (36.8 °C)    Body mass index is 31.49 kg/m².  Weight: 88.5 kg (195 lb 1.7 oz)   Height: 5' 6" (167.6 cm)     Physical Exam   Constitutional: She is oriented to person, place, and time. She appears well-developed and well-nourished. She is active and " cooperative.  Non-toxic appearance. She does not have a sickly appearance. She does not appear ill. No distress.   HENT:   Head: Normocephalic and atraumatic.   Right Ear: Hearing and external ear normal. No decreased hearing is noted.   Left Ear: Hearing and external ear normal. No decreased hearing is noted.   Nose: Nose normal. No rhinorrhea or nasal deformity.   Mouth/Throat: Uvula is midline and oropharynx is clear and moist. She does not have dentures. Normal dentition.   Eyes: Conjunctivae, EOM and lids are normal. Pupils are equal, round, and reactive to light. Right eye exhibits no chemosis, no discharge and no exudate. No foreign body present in the right eye. Left eye exhibits no chemosis, no discharge and no exudate. No foreign body present in the left eye. No scleral icterus.   Neck: Normal range of motion and full passive range of motion without pain. Neck supple.   Cardiovascular: Normal rate, regular rhythm, S1 normal, S2 normal and normal heart sounds.  Exam reveals no gallop and no friction rub.    No murmur heard.  Pulmonary/Chest: Effort normal and breath sounds normal. No accessory muscle usage. No respiratory distress. She has no decreased breath sounds. She has no wheezes. She has no rhonchi. She has no rales.   Abdominal: Soft. Normal appearance. She exhibits no distension. There is no hepatosplenomegaly. There is no tenderness. There is no rigidity, no rebound and no guarding.   Musculoskeletal: Normal range of motion.   Neurological: She is alert and oriented to person, place, and time. She has normal strength. No cranial nerve deficit or sensory deficit. She exhibits normal muscle tone. She displays no seizure activity. Coordination and gait normal.   Skin: Skin is warm, dry and intact. No rash noted. She is not diaphoretic.   Psychiatric: She has a normal mood and affect. Her speech is normal and behavior is normal. Judgment and thought content normal. Cognition and memory are normal. She  is attentive.       Assessment & Plan    1. Iron deficiency anemia, unspecified iron deficiency anemia type  Significant anemia; symptomatic.  Will repeat blood test and test for source of blood loss.  Advised blood transfusion may be necessary based on results and possible colonoscopy or EGD to evaluate possible sources of blood loss.  - Iron and TIBC; Future  - Ferritin; Future  - CBC auto differential; Future  - Occult blood x 1, stool; Future  - Occult blood x 1, stool; Future  - Occult blood x 1, stool; Future    2. Encounter for screening mammogram for breast cancer  Patient is due for her annual mammogram.  Order placed in [facility] and patient will be notified of results once available.   - Mammo Digital Screening Bilat with CAD; Future    3. Vaginal yeast infection  Will treat with oral antifungal to alleviate or prevent symptoms of yeast infection.  Patient advised to use once to resolve current symptoms or use after antibiotic use to prevent symptoms.   - fluconazole (DIFLUCAN) 150 MG Tab; Take 1 tablet (150 mg total) by mouth once.  Dispense: 1 tablet; Refill: 0      Follow up documented    ACTIVE MEDICAL ISSUES:  Documented in Problem List    PAST MEDICAL HISTORY  Documented    PAST SURGICAL HISTORY:  Documented    SOCIAL HISTORY:  Documented    FAMILY HISTORY:  Documented    ALLERGIES AND MEDICATIONS: updated and reviewed.  Documented    Health Maintenance       Date Due Completion Date    TETANUS VACCINE 06/29/1990 ---    Mammogram 06/29/2012 ---    Influenza Vaccine 08/01/2018 ---    Lipid Panel 05/14/2023 5/14/2018

## 2018-06-19 ENCOUNTER — HOSPITAL ENCOUNTER (OUTPATIENT)
Dept: RADIOLOGY | Facility: HOSPITAL | Age: 48
Discharge: HOME | End: 2018-06-19
Attending: FAMILY MEDICINE
Payer: COMMERCIAL

## 2018-06-19 DIAGNOSIS — Z12.31 ENCOUNTER FOR SCREENING MAMMOGRAM FOR BREAST CANCER: ICD-10-CM

## 2018-06-19 PROCEDURE — 77067 SCR MAMMO BI INCL CAD: CPT | Mod: TC

## 2018-06-19 PROCEDURE — 77067 SCR MAMMO BI INCL CAD: CPT | Mod: 26,,, | Performed by: RADIOLOGY

## 2018-06-21 ENCOUNTER — LAB VISIT (OUTPATIENT)
Dept: LAB | Facility: HOSPITAL | Age: 48
End: 2018-06-21
Attending: FAMILY MEDICINE
Payer: COMMERCIAL

## 2018-06-21 DIAGNOSIS — D50.9 IRON DEFICIENCY ANEMIA, UNSPECIFIED IRON DEFICIENCY ANEMIA TYPE: ICD-10-CM

## 2018-06-21 LAB
BASOPHILS # BLD AUTO: 0.02 K/UL
BASOPHILS NFR BLD: 0.3 %
DIFFERENTIAL METHOD: ABNORMAL
EOSINOPHIL # BLD AUTO: 0.2 K/UL
EOSINOPHIL NFR BLD: 2.5 %
ERYTHROCYTE [DISTWIDTH] IN BLOOD BY AUTOMATED COUNT: 21.2 %
FERRITIN SERPL-MCNC: 11 NG/ML
HCT VFR BLD AUTO: 31.3 %
HGB BLD-MCNC: 9 G/DL
IMM GRANULOCYTES # BLD AUTO: 0.02 K/UL
IMM GRANULOCYTES NFR BLD AUTO: 0.3 %
IRON SERPL-MCNC: 21 UG/DL
LYMPHOCYTES # BLD AUTO: 2 K/UL
LYMPHOCYTES NFR BLD: 33.3 %
MCH RBC QN AUTO: 21.7 PG
MCHC RBC AUTO-ENTMCNC: 28.8 G/DL
MCV RBC AUTO: 75 FL
MONOCYTES # BLD AUTO: 0.6 K/UL
MONOCYTES NFR BLD: 9.5 %
NEUTROPHILS # BLD AUTO: 3.2 K/UL
NEUTROPHILS NFR BLD: 54.1 %
NRBC BLD-RTO: 0 /100 WBC
PLATELET # BLD AUTO: 327 K/UL
PMV BLD AUTO: 10.3 FL
RBC # BLD AUTO: 4.15 M/UL
SATURATED IRON: 5 %
TOTAL IRON BINDING CAPACITY: 453 UG/DL
TRANSFERRIN SERPL-MCNC: 306 MG/DL
WBC # BLD AUTO: 5.92 K/UL

## 2018-06-21 PROCEDURE — 82728 ASSAY OF FERRITIN: CPT

## 2018-06-21 PROCEDURE — 85025 COMPLETE CBC W/AUTO DIFF WBC: CPT

## 2018-06-21 PROCEDURE — 83540 ASSAY OF IRON: CPT

## 2018-06-21 PROCEDURE — 36415 COLL VENOUS BLD VENIPUNCTURE: CPT | Mod: PO

## 2018-06-21 PROCEDURE — 82272 OCCULT BLD FECES 1-3 TESTS: CPT

## 2018-06-22 LAB
OB PNL STL: POSITIVE

## 2018-06-25 ENCOUNTER — TELEPHONE (OUTPATIENT)
Dept: FAMILY MEDICINE | Facility: CLINIC | Age: 48
End: 2018-06-25

## 2018-06-25 DIAGNOSIS — Z12.11 COLON CANCER SCREENING: Primary | ICD-10-CM

## 2018-06-25 NOTE — TELEPHONE ENCOUNTER
----- Message from Polly Muhammad sent at 6/25/2018  3:33 PM CDT -----  Contact: Self   Patient need lab results. Please call patient at [phone].

## 2018-06-26 NOTE — TELEPHONE ENCOUNTER
Spoke with patient. Notified of results and recommendations. Verbalized understanding. Colonoscopy ordered at this time. Patient states that she is on 325 mg of iron daily now. Wants to know if she should continue or change how often. Please advise.

## 2018-06-26 NOTE — TELEPHONE ENCOUNTER
----- Message from Dillon Newell sent at 6/26/2018  3:11 PM CDT -----  Contact: Leni [phone]  Patient is requesting a call back in regards to her results. Please call at your earliest convenience.

## 2018-06-29 ENCOUNTER — HOSPITAL ENCOUNTER (OUTPATIENT)
Dept: RADIOLOGY | Facility: HOSPITAL | Age: 48
Discharge: HOME | End: 2018-06-29
Attending: FAMILY MEDICINE

## 2018-06-29 DIAGNOSIS — R92.8 ABNORMAL MAMMOGRAM: ICD-10-CM

## 2018-06-29 PROCEDURE — 76642 ULTRASOUND BREAST LIMITED: CPT | Mod: 26,LT,, | Performed by: RADIOLOGY

## 2018-06-29 PROCEDURE — 76642 ULTRASOUND BREAST LIMITED: CPT | Mod: TC,LT

## 2018-07-05 DIAGNOSIS — Z12.11 COLON CANCER SCREENING: Primary | ICD-10-CM

## 2018-07-12 ENCOUNTER — TELEPHONE (OUTPATIENT)
Dept: FAMILY MEDICINE | Facility: CLINIC | Age: 48
End: 2018-07-12

## 2018-07-12 NOTE — PROGRESS NOTES
Please notify patient of abnormal breast ultrasound results by phone, and schedule follow up within 1 month to discuss abnormal breast study.

## 2018-07-12 NOTE — TELEPHONE ENCOUNTER
Spoke with patient. States that she was called yesterday of results and is scheduled on 7/16/2018 to have biopsy done. She is also scheduled to see provider on 7/17/2018. Would like to know if she should keep this appointment or make it for a later time? Please advise.

## 2018-07-12 NOTE — TELEPHONE ENCOUNTER
----- Message from Azikiwe K. Lombard, MD sent at 7/12/2018 10:58 AM CDT -----  Please notify patient of abnormal breast ultrasound results by phone, and schedule follow up within 1 month to discuss abnormal breast study.

## 2018-07-12 NOTE — TELEPHONE ENCOUNTER
Spoke to patient. Advised by provider to reschedule appointment. Verbalized understanding. Appointment rescheduled at this time.

## 2018-07-17 ENCOUNTER — HOSPITAL ENCOUNTER (OUTPATIENT)
Dept: RADIOLOGY | Facility: HOSPITAL | Age: 48
Discharge: HOME | End: 2018-07-17
Attending: FAMILY MEDICINE
Payer: COMMERCIAL

## 2018-07-17 DIAGNOSIS — N63.0 LUMP OR MASS IN BREAST: ICD-10-CM

## 2018-07-17 PROCEDURE — 77065 DX MAMMO INCL CAD UNI: CPT | Mod: 26,LT,, | Performed by: RADIOLOGY

## 2018-07-17 PROCEDURE — 88305 TISSUE EXAM BY PATHOLOGIST: CPT | Performed by: PATHOLOGY

## 2018-07-17 PROCEDURE — 27201068 US BREAST BIOPSY WITH IMAGING 1ST SITE LEFT

## 2018-07-17 PROCEDURE — 19083 BX BREAST 1ST LESION US IMAG: CPT | Mod: LT,,, | Performed by: RADIOLOGY

## 2018-07-17 PROCEDURE — 77065 DX MAMMO INCL CAD UNI: CPT | Mod: TC,LT

## 2018-07-17 PROCEDURE — 88305 TISSUE EXAM BY PATHOLOGIST: CPT | Mod: 26,,, | Performed by: PATHOLOGY

## 2018-07-19 ENCOUNTER — TELEPHONE (OUTPATIENT)
Dept: FAMILY MEDICINE | Facility: CLINIC | Age: 48
End: 2018-07-19

## 2018-07-19 NOTE — TELEPHONE ENCOUNTER
----- Message from Azikiwe K. Lombard, MD sent at 7/19/2018  9:15 AM CDT -----  Please notify patient of normal biopsy results by phone, and advise we will discuss at upcoming visit.

## 2018-07-19 NOTE — PROGRESS NOTES
Please notify patient of normal biopsy results by phone, and advise we will discuss at upcoming visit.

## 2018-07-24 ENCOUNTER — ANESTHESIA EVENT (OUTPATIENT)
Dept: ENDOSCOPY | Facility: HOSPITAL | Age: 48
End: 2018-07-24
Payer: COMMERCIAL

## 2018-07-25 ENCOUNTER — TELEPHONE (OUTPATIENT)
Dept: FAMILY MEDICINE | Facility: CLINIC | Age: 48
End: 2018-07-25

## 2018-07-25 ENCOUNTER — HOSPITAL ENCOUNTER (OUTPATIENT)
Facility: HOSPITAL | Age: 48
Discharge: HOME | End: 2018-07-25
Attending: INTERNAL MEDICINE | Admitting: INTERNAL MEDICINE
Payer: COMMERCIAL

## 2018-07-25 ENCOUNTER — ANESTHESIA (OUTPATIENT)
Dept: ENDOSCOPY | Facility: HOSPITAL | Age: 48
End: 2018-07-25
Payer: COMMERCIAL

## 2018-07-25 VITALS
HEART RATE: 52 BPM | TEMPERATURE: 98 F | DIASTOLIC BLOOD PRESSURE: 62 MMHG | WEIGHT: 179 LBS | HEIGHT: 66 IN | OXYGEN SATURATION: 96 % | BODY MASS INDEX: 28.77 KG/M2 | RESPIRATION RATE: 18 BRPM | SYSTOLIC BLOOD PRESSURE: 122 MMHG

## 2018-07-25 DIAGNOSIS — Z12.11 SCREEN FOR COLON CANCER: ICD-10-CM

## 2018-07-25 DIAGNOSIS — K63.89 MASS OF COLON: Primary | ICD-10-CM

## 2018-07-25 PROCEDURE — D9220A PRA ANESTHESIA: Mod: ANES,,, | Performed by: ANESTHESIOLOGY

## 2018-07-25 PROCEDURE — 37000008 HC ANESTHESIA 1ST 15 MINUTES: Performed by: INTERNAL MEDICINE

## 2018-07-25 PROCEDURE — 25000003 PHARM REV CODE 250: Performed by: ANESTHESIOLOGY

## 2018-07-25 PROCEDURE — 88305 TISSUE EXAM BY PATHOLOGIST: CPT | Performed by: PATHOLOGY

## 2018-07-25 PROCEDURE — 88305 TISSUE EXAM BY PATHOLOGIST: CPT | Mod: 26,,, | Performed by: PATHOLOGY

## 2018-07-25 PROCEDURE — 45380 COLONOSCOPY AND BIOPSY: CPT | Performed by: INTERNAL MEDICINE

## 2018-07-25 PROCEDURE — 27201012 HC FORCEPS, HOT/COLD, DISP: Performed by: INTERNAL MEDICINE

## 2018-07-25 PROCEDURE — 27201028 HC NEEDLE, SCLERO: Performed by: INTERNAL MEDICINE

## 2018-07-25 PROCEDURE — 37000009 HC ANESTHESIA EA ADD 15 MINS: Performed by: INTERNAL MEDICINE

## 2018-07-25 PROCEDURE — D9220A PRA ANESTHESIA: Mod: CRNA,,, | Performed by: NURSE ANESTHETIST, CERTIFIED REGISTERED

## 2018-07-25 PROCEDURE — 63600175 PHARM REV CODE 636 W HCPCS: Performed by: NURSE ANESTHETIST, CERTIFIED REGISTERED

## 2018-07-25 PROCEDURE — 45381 COLONOSCOPY SUBMUCOUS NJX: CPT | Performed by: INTERNAL MEDICINE

## 2018-07-25 RX ORDER — SODIUM CHLORIDE 9 MG/ML
INJECTION, SOLUTION INTRAVENOUS CONTINUOUS
Status: DISCONTINUED | OUTPATIENT
Start: 2018-07-25 | End: 2018-07-25 | Stop reason: HOSPADM

## 2018-07-25 RX ORDER — SODIUM CHLORIDE 0.9 % (FLUSH) 0.9 %
3 SYRINGE (ML) INJECTION
Status: DISCONTINUED | OUTPATIENT
Start: 2018-07-25 | End: 2018-07-25 | Stop reason: HOSPADM

## 2018-07-25 RX ORDER — LIDOCAINE HYDROCHLORIDE 10 MG/ML
1 INJECTION, SOLUTION EPIDURAL; INFILTRATION; INTRACAUDAL; PERINEURAL ONCE
Status: DISCONTINUED | OUTPATIENT
Start: 2018-07-25 | End: 2018-07-25 | Stop reason: HOSPADM

## 2018-07-25 RX ORDER — LIDOCAINE HCL/PF 100 MG/5ML
SYRINGE (ML) INTRAVENOUS
Status: DISCONTINUED | OUTPATIENT
Start: 2018-07-25 | End: 2018-07-25

## 2018-07-25 RX ORDER — PROPOFOL 10 MG/ML
VIAL (ML) INTRAVENOUS
Status: DISCONTINUED
Start: 2018-07-25 | End: 2018-07-25 | Stop reason: HOSPADM

## 2018-07-25 RX ORDER — PROPOFOL 10 MG/ML
VIAL (ML) INTRAVENOUS
Status: DISCONTINUED | OUTPATIENT
Start: 2018-07-25 | End: 2018-07-25

## 2018-07-25 RX ADMIN — PROPOFOL 40 MG: 10 INJECTION, EMULSION INTRAVENOUS at 08:07

## 2018-07-25 RX ADMIN — PROPOFOL 20 MG: 10 INJECTION, EMULSION INTRAVENOUS at 08:07

## 2018-07-25 RX ADMIN — LIDOCAINE HYDROCHLORIDE 75 MG: 20 INJECTION, SOLUTION INTRAVENOUS at 08:07

## 2018-07-25 RX ADMIN — PROPOFOL 80 MG: 10 INJECTION, EMULSION INTRAVENOUS at 08:07

## 2018-07-25 RX ADMIN — SODIUM CHLORIDE: 0.9 INJECTION, SOLUTION INTRAVENOUS at 08:07

## 2018-07-25 NOTE — ANESTHESIA PREPROCEDURE EVALUATION
07/25/2018  Leni Henson is a 48 y.o., female.    Anesthesia Evaluation    I have reviewed the Patient Summary Reports.    I have reviewed the Nursing Notes.   I have reviewed the Medications.     Review of Systems  Anesthesia Hx:  No previous Anesthesia  Denies Family Hx of Anesthesia complications.   Denies Personal Hx of Anesthesia complications.   Social:  Non-Smoker    Hematology/Oncology:  Hematology Normal   Oncology Normal     EENT/Dental:EENT/Dental Normal   Cardiovascular:  Cardiovascular Normal     Pulmonary:  Pulmonary Normal    Renal/:  Renal/ Normal     Hepatic/GI:  Hepatic/GI Normal    Musculoskeletal:  Musculoskeletal Normal    Neurological:  Neurology Normal    Endocrine:  Endocrine Normal    Dermatological:  Skin Normal    Psych:  Psychiatric Normal           Physical Exam  General:  Well nourished    Airway/Jaw/Neck:  AIRWAY FINDINGS: Normal     Dental:  DENTAL FINDINGS: Normal   Chest/Lungs:  Chest/Lungs Clear    Heart/Vascular:  Heart Findings: Normal       Mental Status:  Mental Status Findings:  Cooperative, Alert and Oriented         Anesthesia Plan  Type of Anesthesia, risks & benefits discussed:  Anesthesia Type:  general  Patient's Preference:   Intra-op Monitoring Plan: standard ASA monitors  Intra-op Monitoring Plan Comments:   Post Op Pain Control Plan: multimodal analgesia, IV/PO Opioids PRN and per primary service following discharge from PACU  Post Op Pain Control Plan Comments:   Induction:    Beta Blocker:  Patient is not currently on a Beta-Blocker (No further documentation required).       Informed Consent: Patient understands risks and agrees with Anesthesia plan.  Questions answered. Anesthesia consent signed with patient.  ASA Score: 2     Day of Surgery Review of History & Physical:    H&P update referred to the provider.  H&P completed by Anesthesiologist.    Anesthesia Plan Notes: npo        Ready For Surgery From Anesthesia Perspective.

## 2018-07-25 NOTE — TELEPHONE ENCOUNTER
Spoke with Dr. Plummer who just performed pts colonoscopy. States she has a large mass obstructing her sigmoid colon. It was biopsied and tattooed. Recommends referral to surgeon ASAP.    Can we please get her scheduled ASAP.   Thanks  Kassy

## 2018-07-25 NOTE — TELEPHONE ENCOUNTER
Ok I will give her a call, but in the future you can put these referrals as STAT priority so it will show in my queue. Thanks

## 2018-07-25 NOTE — [employer] PATIENT INSTRUCTIONS
Discharge Summary/Instructions after an Endoscopic Procedure  Patient Name: Leni Henson  Patient MRN: [id number]  Patient YOB: 1970 Wednesday, July 25, 2018  Rashad Stahl MD  RESTRICTIONS:  During your procedure today, you received medications for sedation.  These   medications may affect your judgment, balance and coordination.  Therefore,   for 24 hours, you have the following restrictions:   - DO NOT drive a car, operate machinery, make legal/financial decisions,   sign important papers or drink alcohol.    ACTIVITY:  Today: no heavy lifting, straining or running due to procedural   sedation/anesthesia.  The following day: return to full activity including work.  DIET:  Eat and drink normally unless instructed otherwise.     TREATMENT FOR COMMON SIDE EFFECTS:  - Mild abdominal pain, nausea, belching, bloating or excessive gas:  rest,   eat lightly and use a heating pad.  - Sore Throat: treat with throat lozenges and/or gargle with warm salt   water.  - Because air was used during the procedure, expelling large amounts of air   from your rectum or belching is normal.  - If a bowel prep was taken, you may not have a bowel movement for 1-3 days.    This is normal.  SYMPTOMS TO WATCH FOR AND REPORT TO YOUR PHYSICIAN:  1. Abdominal pain or bloating, other than gas cramps.  2. Chest pain.  3. Back pain.  4. Signs of infection such as: chills or fever occurring within 24 hours   after the procedure.  5. Rectal bleeding, which would show as bright red, maroon, or black stools.   (A tablespoon of blood from the rectum is not serious, especially if   hemorrhoids are present.)  6. Vomiting.  7. Weakness or dizziness.  GO DIRECTLY TO THE NEAREST EMERGENCY ROOM IF YOU HAVE ANY OF THE FOLLOWING:      Difficulty breathing              Chills and/or fever over 101 F   Persistent vomiting and/or vomiting blood   Severe abdominal pain   Severe chest pain   Black, tarry stools   Bleeding- more than one  tablespoon   Any other symptom or condition that you feel may need urgent attention  Your doctor recommends these additional instructions:  If any biopsies were taken, your doctors clinic will contact you in 1 to 2   weeks with any results.  - Discharge patient to home (with escort).   - Await pathology results.   - Refer to a surgeon.   - The findings and recommendations were discussed with the patient's primary   physician.  For questions, problems or results please call your physician - Rashad Stahl MD at Work:  [phone].  [facility] can be reached at [phone]     IF A COMPLICATION OR EMERGENCY SITUATION ARISES AND YOU ARE UNABLE TO REACH   YOUR PHYSICIAN - GO DIRECTLY TO THE EMERGENCY ROOM.  Rashad Stahl MD  7/25/2018 9:10:20 AM  This report has been verified and signed electronically.  [employer]

## 2018-07-25 NOTE — ANESTHESIA POSTPROCEDURE EVALUATION
"Anesthesia Post Evaluation    Patient: Leni Henson    Procedure(s) Performed: Procedure(s) (LRB):  COLONOSCOPY (N/A)    Final Anesthesia Type: general  Patient location during evaluation: GI PACU  Patient participation: Yes- Able to Participate  Level of consciousness: awake and alert, oriented and awake  Post-procedure vital signs: reviewed and stable  Airway patency: patent  PONV status at discharge: No PONV  Anesthetic complications: no      Cardiovascular status: blood pressure returned to baseline  Respiratory status: unassisted, spontaneous ventilation and room air  Hydration status: euvolemic  Follow-up not needed.        Visit Vitals  /70 (BP Location: Left arm, Patient Position: Lying)   Pulse (!) 48   Temp 36.1 °C (97 °F) (Oral)   Resp 18   Ht 5' 6" (1.676 m)   Wt 81.2 kg (179 lb)   SpO2 100%   Breastfeeding? No   BMI 28.89 kg/m²       Pain/Arron Score: Pain Assessment Performed: Yes (7/25/2018  9:22 AM)  Presence of Pain: denies (7/25/2018  9:22 AM)  Arron Score: 10 (7/25/2018  9:21 AM)      "

## 2018-07-25 NOTE — TRANSFER OF CARE
"Anesthesia Transfer of Care Note    Patient: Leni Henson    Procedure(s) Performed: Procedure(s) (LRB):  COLONOSCOPY (N/A)    Patient location: GI    Anesthesia Type: general    Transport from OR: Transported from OR on room air with adequate spontaneous ventilation    Post pain: adequate analgesia    Post assessment: no apparent anesthetic complications    Post vital signs: stable    Level of consciousness: sedated and responds to stimulation    Nausea/Vomiting: no nausea/vomiting    Complications: none    Transfer of care protocol was followed      Last vitals:   Visit Vitals  /60 (BP Location: Left arm, Patient Position: Lying)   Pulse (!) 59   Temp 36.1 °C (97 °F) (Oral)   Resp 14   Ht 5' 6" (1.676 m)   Wt 81.2 kg (179 lb)   SpO2 100%   Breastfeeding? No   BMI 28.89 kg/m²     "

## 2018-07-25 NOTE — H&P
Pre-Procedure H&P:  Reason for Procedure: heme positive stool    HPI:  Pt is a 48 y.o. female heme positive stool    History reviewed. No pertinent past medical history.    Past Surgical History:   Procedure Laterality Date    HYSTERECTOMY      TUBAL LIGATION         Family History   Problem Relation Age of Onset    Breast cancer Mother     Breast cancer Sister        Social History     Social History    Marital status: Single     Spouse name: N/A    Number of children: N/A    Years of education: N/A     Occupational History    Not on file.     Social History Main Topics    Smoking status: Never Smoker    Smokeless tobacco: Never Used    Alcohol use Yes      Comment: social    Drug use: No    Sexual activity: Yes     Partners: Male     Other Topics Concern    Not on file     Social History Narrative    No narrative on file       Endoscopic History:      Review of patient's allergies indicates:  No Known Allergies    No current facility-administered medications on file prior to encounter.      Current Outpatient Prescriptions on File Prior to Encounter   Medication Sig Dispense Refill    cetirizine (ZYRTEC) 10 MG tablet Take 1 tablet (10 mg total) by mouth once daily. 30 tablet 0    fluticasone (FLONASE) 50 mcg/actuation nasal spray 1 spray (50 mcg total) by Each Nare route 2 (two) times daily as needed. 15 g 2    naproxen (NAPROSYN) 500 MG tablet Take 1 tablet (500 mg total) by mouth 2 (two) times daily with meals. 14 tablet 0    pantoprazole (PROTONIX) 40 MG tablet Take 1 tablet (40 mg total) by mouth once daily. 30 tablet 1       Current Facility-Administered Medications:     0.9%  NaCl infusion, , Intravenous, Continuous, Raysa Garay MD, Last Rate: 10 mL/hr at 07/25/18 0822    lidocaine (PF) 10 mg/ml (1%) injection 10 mg, 1 mL, Intradermal, Once, Raysa Garay MD    sodium chloride 0.9% flush 3 mL, 3 mL, Intravenous, PRN, Fer Segovia MD    ROS: Negative x  "10    Patient Vitals for the past 24 hrs:   BP Temp Temp src Pulse Resp SpO2 Height Weight   07/25/18 0819 104/65 98 °F (36.7 °C) Oral (!) 50 18 100 % 5' 6" (1.676 m) 81.2 kg (179 lb)       Gen: Well developed, well nourished, no apparent distress  HEENT: Anicteric, PERRLA  CV: S1, S2, no murmers/rubs, non-displaced PMI  Lungs: CTA-B, normal excursion  Abd: Soft, NT, ND, normal BS's, no HSM  Ext: No c/c/e, 1+ DP pulses to BLE's  Neuro: CN II-XII grossly intact, no asterixis.  Skin: No rashes/lesions.  Psych: AA&O x 4    Assessment:  Pt. Is a 48 y.o. female with:  1. Heme positive stool    Recommendations:  1. Colonoscopy  2. F/U with PCP      I would like to take this opportunity to thank you for this consult.  If you have any questions or concerns, please do not hesitate to contact me.       "

## 2018-07-26 NOTE — PROGRESS NOTES
CRS Office Visit History and Physical    Referring Md:   Kassy Ahn Pa-c  [address]  [place] [address]    SUBJECTIVE:     Chief Complaint: sigmoid colon mass    History of Present Illness:  Patient is a 48 y.o. female presents with sigmoid colon mass. The patient is a new patient to this practice.   Course is as follows:  Colonoscopy done for heme + stool in the setting of anemia. (7/25/18)  Findings:  Large fungating mass in the sigmoid colon.  Not traversed with the scope.    Path: Invasive adenocarcinoma, well-differentiated    In discussion with her, she has had left-sided abdominal pain for 2 and half years.  This has been associated with a roughly 2-1/2 year history of intermittent blood in the stool.  She consulted her primary care doctor in [place] who reassured her that this was normal.  She therefore did not have any further workup for this.  Functionally, she is independent performs all of her own activities of daily living.  She has 2-3 bowel movements per week that are well formed.  No episodes of fecal incontinence. She continues to pass gas and stool.  She does not eat full meals but rather snacks throughout the day in an effort to avoid abdominal pain that occurs with eating.  She denies any nausea or vomiting.  She has lost approximately 35 lb over the past 2 years.  She has lost 15 lb in the past 3 months.  Her past abdominal surgeries include a laparoscopic total abdominal hysterectomy as well as a laparoscopic cholecystectomy.  She has no family history of colon or rectal cancer.  She has 12 brothers and sisters and 3 children age 23-26.      Review of patient's allergies indicates:  No Known Allergies    Past Medical History:   Diagnosis Date    Malignant neoplasm of splenic flexure 7/27/2018     Past Surgical History:   Procedure Laterality Date    CHOLECYSTECTOMY  2015    laparoscopic    COLONOSCOPY N/A 7/25/2018    Procedure: COLONOSCOPY;  Surgeon: Rashad Stahl MD;   "Location: [facility];  Service: Endoscopy;  Laterality: N/A;    HYSTERECTOMY  2010    TUBAL LIGATION       Family History   Problem Relation Age of Onset    Breast cancer Mother     Breast cancer Sister      Social History   Substance Use Topics    Smoking status: Never Smoker    Smokeless tobacco: Never Used    Alcohol use Yes      Comment: social        Review of Systems:  Review of Systems   Constitutional: Positive for malaise/fatigue and weight loss. Negative for chills, diaphoresis and fever.   HENT: Negative for congestion.    Respiratory: Negative for shortness of breath.    Cardiovascular: Negative for chest pain and leg swelling.   Gastrointestinal: Positive for abdominal pain, blood in stool and constipation. Negative for nausea and vomiting.   Genitourinary: Negative for dysuria.   Musculoskeletal: Negative for back pain and myalgias.   Skin: Negative for rash.   Neurological: Negative for dizziness and weakness.   Endo/Heme/Allergies: Does not bruise/bleed easily.   Psychiatric/Behavioral: Negative for depression.       OBJECTIVE:     Vital Signs (Most Recent)  /78 (BP Location: Left arm, Patient Position: Sitting, BP Method: Large (Automatic))   Pulse 64   Ht 5' 6" (1.676 m)   Wt 87.9 kg (193 lb 12.6 oz)   BMI 31.28 kg/m²     Physical Exam:  General: Black or  female in no distress   Neuro: alert and oriented x 4.  Moves all extremities.     HEENT: no icterus.  Trachea midline  Respiratory: respirations are even and unlabored  Cardiac: regular rate  Abdomen:  Mild tenderness to palpation along the left side of the abdomen. No guarding or rebound.  No tympany to percussion.  No hernia. No inguinal adenopathy.  Port sites from prior laparoscopic surgery are well healed.  Extremities: Warm dry and intact  Skin: no rashes  Anorectal:  External exam was normal. Digital exam was performed with normal tone. No masses palpated.    Labs: H/H = 9/31  Albumin - 3.7  Cr - " 0.7    Imaging: none      ASSESSMENT/PLAN:     Leni was seen today for mass.    Diagnoses and all orders for this visit:    Malignant neoplasm of splenic flexure  -     Prealbumin; Future  -     CT Chest Abdoment Pelvis With Contrast; Future  -     Case Request Operating Room: Lap sigmoid colectomy with possible diverting ileostomy      48-year-old woman with mid sigmoid adenocarcinoma.  Today we discussed the staging colon cancer to include the depth of invasion, rupinder metastasis, and distant spread.  We discussed that staging would include lab evaluation as well as a CT of her chest abdomen and pelvis.  On her flexible scope today, the lesion does not appear completely obstructing but is near obstructing.  She would certainly benefit from having lesion removed.  We discussed that she is still a candidate for laparoscopic surgery pending her CT scan.  Given her weight loss, we did discuss the option of a possible diverting loop ileostomy.  We will plan to check her nutritional labs today.  We discussed the role for surgery. Given her tumor location, she would need a sigmoid colectomy with lymphadenectomy based off of the left colic artery.  This required mobilization of the splenic flexure and colorectal anastomosis. We discussed the risks of anastomotic leak, bleeding, need for additional treatment in the form of chemotherapy, hernia, and wound complications.  We discussed the typical hospital stay 3-5 days following surgery as well as a 4-6 week total recovery time.  We will get her scans and labs and see her back next week.      Flexible endoscopy:  Flexible sigmoidoscopy was done in the office.  Flexible endoscope was inserted through the anal canal.  A Near obstructing circumferential mass in the mid sigmoid colon consistent with malignancy at 28 cm from the anal verge.  This is in the sigmoid colon in the middle of the sigmoid colon.  Has stigmata of recent bleeding. No biopsies taken.  She tolerated the  procedure well.      SARAHI Kim MD  Staff Surgeon  Colon & Rectal Surgery

## 2018-07-27 ENCOUNTER — LAB VISIT (OUTPATIENT)
Dept: LAB | Facility: HOSPITAL | Age: 48
End: 2018-07-27
Attending: COLON & RECTAL SURGERY
Payer: COMMERCIAL

## 2018-07-27 ENCOUNTER — HOSPITAL ENCOUNTER (OUTPATIENT)
Dept: CARDIOLOGY | Facility: CLINIC | Age: 48
Discharge: HOME | End: 2018-07-27
Payer: COMMERCIAL

## 2018-07-27 ENCOUNTER — OFFICE VISIT (OUTPATIENT)
Dept: SURGERY | Facility: CLINIC | Age: 48
End: 2018-07-27
Payer: COMMERCIAL

## 2018-07-27 VITALS
HEIGHT: 66 IN | HEART RATE: 64 BPM | BODY MASS INDEX: 31.15 KG/M2 | DIASTOLIC BLOOD PRESSURE: 78 MMHG | SYSTOLIC BLOOD PRESSURE: 129 MMHG | WEIGHT: 193.81 LBS

## 2018-07-27 DIAGNOSIS — C18.5 MALIGNANT NEOPLASM OF SPLENIC FLEXURE: ICD-10-CM

## 2018-07-27 DIAGNOSIS — C18.9 MALIGNANT NEOPLASM OF COLON, UNSPECIFIED PART OF COLON: ICD-10-CM

## 2018-07-27 DIAGNOSIS — Z01.818 PRE-OP EVALUATION: ICD-10-CM

## 2018-07-27 DIAGNOSIS — C18.5 MALIGNANT NEOPLASM OF SPLENIC FLEXURE: Primary | ICD-10-CM

## 2018-07-27 DIAGNOSIS — Z01.818 PRE-OP EVALUATION: Primary | ICD-10-CM

## 2018-07-27 LAB
ALBUMIN SERPL BCP-MCNC: 3.8 G/DL
ALP SERPL-CCNC: 84 U/L
ALT SERPL W/O P-5'-P-CCNC: 8 U/L
ANION GAP SERPL CALC-SCNC: 8 MMOL/L
AST SERPL-CCNC: 13 U/L
BASOPHILS # BLD AUTO: 0.03 K/UL
BASOPHILS NFR BLD: 0.4 %
BILIRUB SERPL-MCNC: 0.4 MG/DL
BUN SERPL-MCNC: 11 MG/DL
CALCIUM SERPL-MCNC: 9.5 MG/DL
CEA SERPL-MCNC: 5.7 NG/ML
CHLORIDE SERPL-SCNC: 104 MMOL/L
CO2 SERPL-SCNC: 29 MMOL/L
CREAT SERPL-MCNC: 0.8 MG/DL
DIFFERENTIAL METHOD: ABNORMAL
EOSINOPHIL # BLD AUTO: 0.1 K/UL
EOSINOPHIL NFR BLD: 1.7 %
ERYTHROCYTE [DISTWIDTH] IN BLOOD BY AUTOMATED COUNT: 19.3 %
EST. GFR  (AFRICAN AMERICAN): >60 ML/MIN/1.73 M^2
EST. GFR  (NON AFRICAN AMERICAN): >60 ML/MIN/1.73 M^2
GLUCOSE SERPL-MCNC: 97 MG/DL
HCT VFR BLD AUTO: 31.2 %
HGB BLD-MCNC: 8.9 G/DL
IMM GRANULOCYTES # BLD AUTO: 0.02 K/UL
IMM GRANULOCYTES NFR BLD AUTO: 0.2 %
LYMPHOCYTES # BLD AUTO: 2.5 K/UL
LYMPHOCYTES NFR BLD: 30.5 %
MCH RBC QN AUTO: 21.3 PG
MCHC RBC AUTO-ENTMCNC: 28.5 G/DL
MCV RBC AUTO: 75 FL
MONOCYTES # BLD AUTO: 0.8 K/UL
MONOCYTES NFR BLD: 9.2 %
NEUTROPHILS # BLD AUTO: 4.8 K/UL
NEUTROPHILS NFR BLD: 58 %
NRBC BLD-RTO: 0 /100 WBC
PLATELET # BLD AUTO: 357 K/UL
PMV BLD AUTO: 9.4 FL
POTASSIUM SERPL-SCNC: 3.5 MMOL/L
PREALB SERPL-MCNC: 18 MG/DL
PROT SERPL-MCNC: 7.8 G/DL
RBC # BLD AUTO: 4.17 M/UL
SODIUM SERPL-SCNC: 141 MMOL/L
WBC # BLD AUTO: 8.22 K/UL

## 2018-07-27 PROCEDURE — 99999 PR PBB SHADOW E&M-EST. PATIENT-LVL IV: CPT | Mod: PBBFAC,,, | Performed by: COLON & RECTAL SURGERY

## 2018-07-27 PROCEDURE — 85025 COMPLETE CBC W/AUTO DIFF WBC: CPT

## 2018-07-27 PROCEDURE — 82378 CARCINOEMBRYONIC ANTIGEN: CPT

## 2018-07-27 PROCEDURE — 99205 OFFICE O/P NEW HI 60 MIN: CPT | Mod: 25,S$GLB,, | Performed by: COLON & RECTAL SURGERY

## 2018-07-27 PROCEDURE — 93000 ELECTROCARDIOGRAM COMPLETE: CPT | Mod: S$GLB,,, | Performed by: INTERNAL MEDICINE

## 2018-07-27 PROCEDURE — 36415 COLL VENOUS BLD VENIPUNCTURE: CPT

## 2018-07-27 PROCEDURE — 80053 COMPREHEN METABOLIC PANEL: CPT

## 2018-07-27 PROCEDURE — 84134 ASSAY OF PREALBUMIN: CPT

## 2018-07-27 PROCEDURE — 45330 DIAGNOSTIC SIGMOIDOSCOPY: CPT | Mod: S$GLB,,, | Performed by: COLON & RECTAL SURGERY

## 2018-07-27 NOTE — LETTER
July 27, 2018      Kassy Ahn PA-C  [address]  [place] [address]           [facility]  [address]  [facility] [address]  Phone: [phone]          Patient: Leni Henson   MR Number: [id number]   YOB: 1970   Date of Visit: 7/27/2018       Dear Kassy Ahn:    Thank you for referring Leni Henson to me for evaluation. Attached you will find relevant portions of my assessment and plan of care.    If you have questions, please do not hesitate to call me. I look forward to following Leni Henson along with you.    Sincerely,    Thomas Kim MD    Enclosure  CC:  No Recipients    If you would like to receive this communication electronically, please contact [email or web address] or [phone] to request more information on [employer] Link access.    For providers and/or their staff who would like to refer a patient to [facility], please contact us through our one-stop-shop provider referral line, [facility] , at [phone].    If you feel you have received this communication in error or would no longer like to receive these types of communications, please e-mail [email or web address]

## 2018-07-30 ENCOUNTER — OFFICE VISIT (OUTPATIENT)
Dept: FAMILY MEDICINE | Facility: CLINIC | Age: 48
End: 2018-07-30
Payer: COMMERCIAL

## 2018-07-30 ENCOUNTER — HOSPITAL ENCOUNTER (OUTPATIENT)
Dept: RADIOLOGY | Facility: HOSPITAL | Age: 48
Discharge: HOME | End: 2018-07-30
Attending: COLON & RECTAL SURGERY
Payer: COMMERCIAL

## 2018-07-30 VITALS
HEART RATE: 64 BPM | BODY MASS INDEX: 30.97 KG/M2 | OXYGEN SATURATION: 98 % | DIASTOLIC BLOOD PRESSURE: 80 MMHG | TEMPERATURE: 98 F | WEIGHT: 192.69 LBS | RESPIRATION RATE: 16 BRPM | SYSTOLIC BLOOD PRESSURE: 120 MMHG | HEIGHT: 66 IN

## 2018-07-30 DIAGNOSIS — C18.5 MALIGNANT NEOPLASM OF SPLENIC FLEXURE: Primary | ICD-10-CM

## 2018-07-30 DIAGNOSIS — C18.5 MALIGNANT NEOPLASM OF SPLENIC FLEXURE: ICD-10-CM

## 2018-07-30 PROBLEM — Z12.11 SCREEN FOR COLON CANCER: Status: RESOLVED | Noted: 2018-07-25 | Resolved: 2018-07-30

## 2018-07-30 PROCEDURE — 74177 CT ABD & PELVIS W/CONTRAST: CPT | Mod: TC

## 2018-07-30 PROCEDURE — 99999 PR PBB SHADOW E&M-EST. PATIENT-LVL III: CPT | Mod: PBBFAC,,, | Performed by: FAMILY MEDICINE

## 2018-07-30 PROCEDURE — 25500020 PHARM REV CODE 255: Performed by: COLON & RECTAL SURGERY

## 2018-07-30 PROCEDURE — 99214 OFFICE O/P EST MOD 30 MIN: CPT | Mod: S$GLB,,, | Performed by: FAMILY MEDICINE

## 2018-07-30 PROCEDURE — 74177 CT ABD & PELVIS W/CONTRAST: CPT | Mod: 26,,, | Performed by: RADIOLOGY

## 2018-07-30 PROCEDURE — 71260 CT THORAX DX C+: CPT | Mod: 26,,, | Performed by: RADIOLOGY

## 2018-07-30 RX ORDER — HEPARIN SODIUM 5000 [USP'U]/ML
5000 INJECTION, SOLUTION INTRAVENOUS; SUBCUTANEOUS
Status: CANCELLED | OUTPATIENT
Start: 2018-07-30

## 2018-07-30 RX ORDER — MUPIROCIN 20 MG/G
OINTMENT TOPICAL
Status: CANCELLED | OUTPATIENT
Start: 2018-07-30

## 2018-07-30 RX ORDER — ACETAMINOPHEN 10 MG/ML
1000 INJECTION, SOLUTION INTRAVENOUS
Status: CANCELLED | OUTPATIENT
Start: 2018-07-30 | End: 2018-07-30

## 2018-07-30 RX ORDER — SODIUM CHLORIDE 9 MG/ML
INJECTION, SOLUTION INTRAVENOUS CONTINUOUS
Status: CANCELLED | OUTPATIENT
Start: 2018-07-30

## 2018-07-30 RX ORDER — METRONIDAZOLE 500 MG/100ML
500 INJECTION, SOLUTION INTRAVENOUS
Status: CANCELLED | OUTPATIENT
Start: 2018-07-30

## 2018-07-30 RX ADMIN — IOHEXOL 100 ML: 350 INJECTION, SOLUTION INTRAVENOUS at 05:07

## 2018-07-30 RX ADMIN — IOHEXOL 15 ML: 350 INJECTION, SOLUTION INTRAVENOUS at 04:07

## 2018-07-30 RX ADMIN — IOHEXOL 15 ML: 350 INJECTION, SOLUTION INTRAVENOUS at 03:07

## 2018-07-30 NOTE — PROGRESS NOTES
"Chief Complaint   Patient presents with    colonoscopy result    Follow-up       Leni Henson is a 48 y.o. female who presents per the Chief Complaint.  Pt is known to me and was last seen by me on 6/18/2018.  All known chronic medical issues have been documented.       HPI     ROS  Review of Systems   Constitutional: Positive for chills. Negative for activity change, appetite change, diaphoresis, fatigue, fever and unexpected weight change.   HENT: Negative.  Negative for congestion, ear pain, hearing loss, nosebleeds, postnasal drip, rhinorrhea, sinus pressure, sneezing, sore throat and trouble swallowing.    Eyes: Negative for pain and visual disturbance.   Respiratory: Negative for cough, choking and shortness of breath.    Cardiovascular: Negative for chest pain and leg swelling.   Gastrointestinal: Negative for abdominal pain, constipation, diarrhea, nausea and vomiting.   Endocrine: Positive for cold intolerance.   Genitourinary: Negative for difficulty urinating, dysuria, frequency, urgency and vaginal discharge.   Musculoskeletal: Negative.  Negative for arthralgias, back pain, gait problem, joint swelling and myalgias.   Skin: Negative.    Allergic/Immunologic: Negative for environmental allergies and food allergies.   Neurological: Negative.  Negative for dizziness, seizures, syncope, weakness, light-headedness and headaches.   Psychiatric/Behavioral: Negative.  Negative for confusion, decreased concentration, dysphoric mood and sleep disturbance. The patient is not nervous/anxious.        Physical Exam  Vitals:    07/30/18 0910   BP: 120/80   Pulse: 64   Resp: 16   Temp: 98.4 °F (36.9 °C)    Body mass index is 31.1 kg/m².  Weight: 87.4 kg (192 lb 10.9 oz)   Height: 5' 6" (167.6 cm)     Physical Exam   Constitutional: She is oriented to person, place, and time. She appears well-developed and well-nourished. She is active and cooperative.  Non-toxic appearance. She does not have a sickly appearance. " She does not appear ill. No distress.   HENT:   Head: Normocephalic and atraumatic.   Right Ear: Hearing and external ear normal. No decreased hearing is noted.   Left Ear: Hearing and external ear normal. No decreased hearing is noted.   Nose: Nose normal. No rhinorrhea or nasal deformity.   Mouth/Throat: Uvula is midline and oropharynx is clear and moist. She does not have dentures. Normal dentition.   Eyes: Conjunctivae, EOM and lids are normal. Pupils are equal, round, and reactive to light. Right eye exhibits no chemosis, no discharge and no exudate. No foreign body present in the right eye. Left eye exhibits no chemosis, no discharge and no exudate. No foreign body present in the left eye. No scleral icterus.   Neck: Normal range of motion and full passive range of motion without pain. Neck supple.   Cardiovascular: Normal rate, regular rhythm, S1 normal, S2 normal and normal heart sounds.  Exam reveals no gallop and no friction rub.    No murmur heard.  Pulmonary/Chest: Effort normal and breath sounds normal. No accessory muscle usage. No respiratory distress. She has no decreased breath sounds. She has no wheezes. She has no rhonchi. She has no rales.   Abdominal: Soft. Normal appearance. She exhibits no distension. There is no hepatosplenomegaly. There is no tenderness. There is no rigidity, no rebound and no guarding.   Musculoskeletal: Normal range of motion.   Neurological: She is alert and oriented to person, place, and time. She has normal strength. No cranial nerve deficit or sensory deficit. She exhibits normal muscle tone. She displays no seizure activity. Coordination and gait normal.   Skin: Skin is warm, dry and intact. No rash noted. She is not diaphoretic.   Psychiatric: She has a normal mood and affect. Her speech is normal and behavior is normal. Judgment and thought content normal. Cognition and memory are normal. She is attentive.       Assessment & Plan    1. Malignant neoplasm of splenic  flexure  Malignancy found with colonoscopy and confirmed with pathology; surgery scheduled next week.  Imaging ordered to evaluate staging; may need chemotherapy based on findings.  Will follow up 4 weeks after discharge to discuss.      Follow up documented    ACTIVE MEDICAL ISSUES:  Documented in Problem List    PAST MEDICAL HISTORY  Documented    PAST SURGICAL HISTORY:  Documented    SOCIAL HISTORY:  Documented    FAMILY HISTORY:  Documented    ALLERGIES AND MEDICATIONS: updated and reviewed.  Documented    Health Maintenance       Date Due Completion Date    Pneumococcal PCV13 (High Risk) (1 - PCV13 Required) 06/29/1973 ---    TETANUS VACCINE 06/29/1990 ---    Pneumococcal PPSV23 (High Risk) (1) 06/29/1990 ---    Influenza Vaccine 08/01/2018 ---    Mammogram 07/17/2020 7/17/2018    Lipid Panel 05/14/2023 5/14/2018

## 2018-08-03 ENCOUNTER — OFFICE VISIT (OUTPATIENT)
Dept: SURGERY | Facility: CLINIC | Age: 48
End: 2018-08-03
Payer: COMMERCIAL

## 2018-08-03 ENCOUNTER — ANESTHESIA EVENT (OUTPATIENT)
Dept: SURGERY | Facility: HOSPITAL | Age: 48
DRG: 331 | End: 2018-08-03
Payer: COMMERCIAL

## 2018-08-03 VITALS
WEIGHT: 194.25 LBS | BODY MASS INDEX: 31.22 KG/M2 | HEART RATE: 68 BPM | HEIGHT: 66 IN | SYSTOLIC BLOOD PRESSURE: 120 MMHG | DIASTOLIC BLOOD PRESSURE: 75 MMHG

## 2018-08-03 DIAGNOSIS — C18.7 CANCER OF SIGMOID COLON: ICD-10-CM

## 2018-08-03 PROCEDURE — 99213 OFFICE O/P EST LOW 20 MIN: CPT | Mod: S$GLB,,, | Performed by: COLON & RECTAL SURGERY

## 2018-08-03 PROCEDURE — 99999 PR PBB SHADOW E&M-EST. PATIENT-LVL III: CPT | Mod: PBBFAC,,, | Performed by: COLON & RECTAL SURGERY

## 2018-08-03 NOTE — PROGRESS NOTES
CRS Office Visit Followup      SUBJECTIVE:     Chief Complaint: sigmoid colon cancer    History of Present Illness:  Patient is a 48 y.o. female presents with sigmoid colon cancer. The patient is an established patient to this practice.   Course is as follows:  Colonoscopy done for heme + stool in the setting of anemia. (7/25/18)  Findings:  Large fungating mass in the sigmoid colon.  Not traversed with the scope.    Path: Invasive adenocarcinoma, well-differentiated    In discussion with her, she has had left-sided abdominal pain for 2 and half years.  This has been associated with a roughly 2-1/2 year history of intermittent blood in the stool.  Functionally, she is independent performs all of her own activities of daily living.  She has 2-3 bowel movements per week that are well formed.  No episodes of fecal incontinence. She continues to pass gas and stool.  She denies any nausea or vomiting.  She has lost approximately 35 lb over the past 2 years.  She has lost 15 lb in the past 3 months.  Her past abdominal surgeries include a laparoscopic total abdominal hysterectomy as well as a laparoscopic cholecystectomy.  She has no family history of colon or rectal cancer.  She has 12 brothers and sisters and 3 children age 23-26.  Her brother was recently diagnosed with colon cancer as well.    Workup was done and did not demonstrate any evidence of metastatic disease.      Flex done in the office and demonstrated  A near obstructing circumferential mass in the mid sigmoid colon consistent with malignancy at 28 cm from the anal verge.  This is in the sigmoid colon in the middle of the sigmoid colon.     She presents today for follow-up.  No changes in her health.    Review of Systems:  Review of Systems   Constitutional: Positive for malaise/fatigue and weight loss. Negative for chills, diaphoresis and fever.   HENT: Negative for congestion.    Respiratory: Negative for shortness of breath.    Cardiovascular: Negative  "for chest pain and leg swelling.   Gastrointestinal: Positive for abdominal pain, blood in stool and constipation. Negative for nausea and vomiting.   Genitourinary: Negative for dysuria.   Musculoskeletal: Negative for back pain and myalgias.   Skin: Negative for rash.   Neurological: Negative for dizziness and weakness.   Endo/Heme/Allergies: Does not bruise/bleed easily.   Psychiatric/Behavioral: Negative for depression.       OBJECTIVE:     Vital Signs (Most Recent)  /75   Pulse 68   Ht 5' 5.98" (1.676 m)   Wt 88.1 kg (194 lb 3.6 oz)   BMI 31.36 kg/m²     Physical Exam:  General: Black or  female in no distress   Neuro: alert and oriented x 4.  Moves all extremities.     HEENT: no icterus.  Trachea midline  Respiratory: respirations are even and unlabored  Cardiac: regular rate  Abdomen:  Mild tenderness to palpation along the left side of the abdomen. No guarding or rebound.  No tympany to percussion.  No hernia. No inguinal adenopathy.  Port sites from prior laparoscopic surgery are well healed.  Extremities: Warm dry and intact  Skin: no rashes  Anorectal:  External exam was normal. Digital exam was performed with normal tone. No masses palpated.    Labs: H/H = 9/31  Albumin - 3.8  Prealbumin = 18  Cr - 0.7  CEA elevated at 5.7    Imaging: CT chest/abd/pelvis reviewed.   No evidence of metastatic disease.       ASSESSMENT/PLAN:     Diagnoses and all orders for this visit:    Cancer of sigmoid colon         48-year-old woman with mid sigmoid adenocarcinoma with no metastatic disease on her recent imaging.  Nutritional labs were re-assuring.  Given her tumor location, she would need a sigmoid colectomy with lymphadenectomy based off of the left colic artery.  This required mobilization of the splenic flexure and colorectal anastomosis. We discussed the risks of anastomotic leak, bleeding, need for additional treatment in the form of chemotherapy, hernia, and wound complications.  We " discussed the typical hospital stay 3-5 days following surgery as well as a 4-6 week total recovery time.  Consents were signed today.  She will need to be in lithotomy position.        SARAHI Kim MD  Staff Surgeon  Colon & Rectal Surgery

## 2018-08-03 NOTE — ANESTHESIA PREPROCEDURE EVALUATION
[facility]  Anesthesia Pre-Operative Evaluation         Patient Name: Leni Henson  YOB: 1970  MRN: [id number]    SUBJECTIVE:     Pre-operative evaluation for Procedure(s) (LRB):  Lap sigmoid colectomy with possible diverting ileostomy (N/A)     08/03/2018    Leni Henson is a 48 y.o. female w/ a significant PMHx of invasive adenocarcinoma of the sigmoid colon.    Patient now presents for the above procedure(s).    Flexible sigmoidoscopy showed a near obstructing circumferential mass in the mid sigmoid colon consistent with malignancy at 28 cm from the anal verge.  This is in the sigmoid colon in the middle of the sigmoid colon.  Has stigmata of recent bleeding. No biopsies taken.      Prev airway: None documented        Patient Active Problem List   Diagnosis    Malignant neoplasm of splenic flexure       Review of patient's allergies indicates:  No Known Allergies    Current Inpatient Medications:      No current facility-administered medications on file prior to encounter.      Current Outpatient Prescriptions on File Prior to Encounter   Medication Sig Dispense Refill    cetirizine (ZYRTEC) 10 MG tablet Take 1 tablet (10 mg total) by mouth once daily. 30 tablet 0    fluticasone (FLONASE) 50 mcg/actuation nasal spray 1 spray (50 mcg total) by Each Nare route 2 (two) times daily as needed. 15 g 2    naproxen (NAPROSYN) 500 MG tablet Take 1 tablet (500 mg total) by mouth 2 (two) times daily with meals. 14 tablet 0       Past Surgical History:   Procedure Laterality Date    CHOLECYSTECTOMY  2015    laparoscopic    COLONOSCOPY N/A 7/25/2018    Procedure: COLONOSCOPY;  Surgeon: Rashad Stahl MD;  Location: [facility];  Service: Endoscopy;  Laterality: N/A;    HYSTERECTOMY  2010    TUBAL LIGATION         Social History     Social History    Marital status: Single     Spouse name: N/A    Number of children: N/A    Years of education: N/A     Occupational History     Not on file.     Social History Main Topics    Smoking status: Never Smoker    Smokeless tobacco: Never Used    Alcohol use Yes      Comment: social    Drug use: No    Sexual activity: Yes     Partners: Male     Other Topics Concern    Not on file     Social History Narrative    No narrative on file       OBJECTIVE:     Vital Signs Range (Last 24H):         CBC:   No results for input(s): WBC, RBC, HGB, HCT, PLT, MCV, MCH, MCHC in the last 72 hours.    CMP: No results for input(s): NA, K, CL, CO2, BUN, CREATININE, GLU, MG, PHOS, CALCIUM, ALBUMIN, PROT, ALKPHOS, ALT, AST, BILITOT in the last 72 hours.    INR:  No results for input(s): PT, INR, PROTIME, APTT in the last 72 hours.    Diagnostic Studies: No relevant studies.    EK2018  Vent. Rate : 053 BPM     Atrial Rate : 053 BPM     P-R Int : 152 ms          QRS Dur : 078 ms      QT Int : 418 ms       P-R-T Axes : 059 047 023 degrees     QTc Int : 392 ms    Sinus bradycardia  Nonspecific ST abnormality  Abnormal ECG  When compared with ECG of 2017 19:17,  No significant change was found  Confirmed by Ruma PFEIFFER, Saud Kincaid ([id number]) on 2018 9:28:42 AM    Referred By: LETICIA JORDAN           Confirmed By:Saud Hendrix MD    2D ECHO:  No results found for this or any previous visit.    CT C/A/P 2018  Impression     Circumferential wall thickening and enhancement of the mid sigmoid colon as seen on series 2, image 182 likely corresponding with the known near obstructing mass on flex sigmoidoscopy and colonoscopy.    0.4 x 0.4 cm solid nodule in the right lower lobe as seen on series 2, image 70.  For a solid nodule <6 mm, Fleischner Society 2017 guidelines recommend no routine follow up for a low risk patient, or follow-up with non-contrast chest CT at 12 months in a high risk patient.    Changes of surgical hysterectomy with thickening of the vaginal wall and poor definition between the distal sigmoid and vaginal wall.   Clinical correlation with the location of tumor on colonoscopy is recommended noting that this may represent simple abutment of the normal distal sigmoid and postsurgical vagina.    This report was flagged in Epic as abnormal.    Electronically signed by resident: Fer Castillo  Date: 07/31/2018  Time: 07:48    Electronically signed by: Kris Villatoro MD  Date: 07/31/2018  Time: 08:49         ASSESSMENT/PLAN:       Anesthesia Evaluation    I have reviewed the Patient Summary Reports.    I have reviewed the Nursing Notes.   I have reviewed the Medications.     Review of Systems  Anesthesia Hx:  No problems with previous Anesthesia Denies Hx of Anesthetic complications  History of prior surgery of interest to airway management or planning: (cholecystectomy)  Denies Personal Hx of Anesthesia complications.   Social:  Non-Smoker    Hematology/Oncology:         -- Anemia: Current/Recent Cancer.   EENT/Dental:EENT/Dental Normal   Cardiovascular:  Cardiovascular Normal Exercise tolerance: good  Denies Hypertension.  Denies MI.  Denies CAD.       Pulmonary:  Pulmonary Normal    Renal/:  Renal/ Normal     Hepatic/GI:   Denies GERD.    Musculoskeletal:  Musculoskeletal Normal    Neurological:  Neurology Normal    Endocrine:  Endocrine Normal    Psych:  Psychiatric Normal           Physical Exam  General:  Well nourished    Airway/Jaw/Neck:  Airway Findings: Mouth Opening: Normal Tongue: Normal  General Airway Assessment: Adult  Mallampati: II  Improves to I with phonation.  TM Distance: Normal, at least 6 cm  Jaw/Neck Findings:  Neck ROM: Normal ROM      Dental:  Dental Findings: In tact    Chest/Lungs:  Chest/Lungs Findings: Clear to auscultation, Normal Respiratory Rate     Heart/Vascular:  Heart Findings: Rate: Normal  Rhythm: Regular Rhythm     Abdomen:  Abdomen Findings:  Normal, Soft, Nontender     Musculoskeletal:  Musculoskeletal Findings: Normal    Mental Status:  Mental Status Findings:  Cooperative, Alert and  Oriented         Anesthesia Plan  Type of Anesthesia, risks & benefits discussed:  Anesthesia Type:  general  Patient's Preference:   Intra-op Monitoring Plan: standard ASA monitors  Intra-op Monitoring Plan Comments:   Post Op Pain Control Plan: multimodal analgesia, IV/PO Opioids PRN and per primary service following discharge from PACU  Post Op Pain Control Plan Comments:   Induction:   IV  Beta Blocker:  Patient is not currently on a Beta-Blocker (No further documentation required).       Informed Consent: Patient understands risks and agrees with Anesthesia plan.  Questions answered. Anesthesia consent signed with patient.  ASA Score: 3     Day of Surgery Review of History & Physical: I have interviewed and examined the patient. I have reviewed the patient's H&P dated:            Ready For Surgery From Anesthesia Perspective.

## 2018-08-03 NOTE — LETTER
August 3, 2018      Azikiwe K. Lombard, MD  [address] [facility] [place] [address]           [facility]  [address]  [facility] [address]  Phone: [phone]          Patient: Leni Henson   MR Number: [id number]   YOB: 1970   Date of Visit: 8/3/2018       Dear Dr. Azikiwe K. Lombard:    Thank you for referring Leni Henson to me for evaluation. Attached you will find relevant portions of my assessment and plan of care.    If you have questions, please do not hesitate to call me. I look forward to following Leni Henson along with you.    Sincerely,    Thomas Kim MD    Enclosure  CC:  No Recipients    If you would like to receive this communication electronically, please contact [email or web address] or [phone] to request more information on [employer] Link access.    For providers and/or their staff who would like to refer a patient to [facility], please contact us through our one-stop-shop provider referral line, [facility], at [phone].    If you feel you have received this communication in error or would no longer like to receive these types of communications, please e-mail [email or web address]

## 2018-08-06 ENCOUNTER — HOSPITAL ENCOUNTER (INPATIENT)
Facility: HOSPITAL | Age: 48
LOS: 5 days | Discharge: HOME | DRG: 331 | End: 2018-08-11
Attending: COLON & RECTAL SURGERY | Admitting: COLON & RECTAL SURGERY
Payer: COMMERCIAL

## 2018-08-06 ENCOUNTER — ANESTHESIA (OUTPATIENT)
Dept: SURGERY | Facility: HOSPITAL | Age: 48
DRG: 331 | End: 2018-08-06
Payer: COMMERCIAL

## 2018-08-06 DIAGNOSIS — C18.5 MALIGNANT NEOPLASM OF SPLENIC FLEXURE: ICD-10-CM

## 2018-08-06 DIAGNOSIS — Z90.49 S/P COLECTOMY: ICD-10-CM

## 2018-08-06 DIAGNOSIS — R53.81 DEBILITY: ICD-10-CM

## 2018-08-06 DIAGNOSIS — C18.7 CANCER OF SIGMOID COLON: Primary | ICD-10-CM

## 2018-08-06 PROCEDURE — 25000003 PHARM REV CODE 250: Performed by: STUDENT IN AN ORGANIZED HEALTH CARE EDUCATION/TRAINING PROGRAM

## 2018-08-06 PROCEDURE — 36000710: Performed by: COLON & RECTAL SURGERY

## 2018-08-06 PROCEDURE — 63600175 PHARM REV CODE 636 W HCPCS: Performed by: COLON & RECTAL SURGERY

## 2018-08-06 PROCEDURE — 63600175 PHARM REV CODE 636 W HCPCS: Performed by: STUDENT IN AN ORGANIZED HEALTH CARE EDUCATION/TRAINING PROGRAM

## 2018-08-06 PROCEDURE — S0030 INJECTION, METRONIDAZOLE: HCPCS | Performed by: NURSE PRACTITIONER

## 2018-08-06 PROCEDURE — 63600175 PHARM REV CODE 636 W HCPCS: Performed by: NURSE PRACTITIONER

## 2018-08-06 PROCEDURE — 37000008 HC ANESTHESIA 1ST 15 MINUTES: Performed by: COLON & RECTAL SURGERY

## 2018-08-06 PROCEDURE — 27201423 OPTIME MED/SURG SUP & DEVICES STERILE SUPPLY: Performed by: COLON & RECTAL SURGERY

## 2018-08-06 PROCEDURE — 36000711: Performed by: COLON & RECTAL SURGERY

## 2018-08-06 PROCEDURE — 94799 UNLISTED PULMONARY SVC/PX: CPT

## 2018-08-06 PROCEDURE — 88302 TISSUE EXAM BY PATHOLOGIST: CPT | Performed by: PATHOLOGY

## 2018-08-06 PROCEDURE — 44207 L COLECTOMY/COLOPROCTOSTOMY: CPT | Mod: ,,, | Performed by: COLON & RECTAL SURGERY

## 2018-08-06 PROCEDURE — C9290 INJ, BUPIVACAINE LIPOSOME: HCPCS | Performed by: COLON & RECTAL SURGERY

## 2018-08-06 PROCEDURE — 0DTN4ZZ RESECTION OF SIGMOID COLON, PERCUTANEOUS ENDOSCOPIC APPROACH: ICD-10-PCS | Performed by: COLON & RECTAL SURGERY

## 2018-08-06 PROCEDURE — 20600001 HC STEP DOWN PRIVATE ROOM

## 2018-08-06 PROCEDURE — D9220A PRA ANESTHESIA: Mod: ,,, | Performed by: ANESTHESIOLOGY

## 2018-08-06 PROCEDURE — 44213 LAP MOBIL SPLENIC FL ADD-ON: CPT | Mod: ,,, | Performed by: COLON & RECTAL SURGERY

## 2018-08-06 PROCEDURE — 99900035 HC TECH TIME PER 15 MIN (STAT)

## 2018-08-06 PROCEDURE — 25000003 PHARM REV CODE 250: Performed by: NURSE PRACTITIONER

## 2018-08-06 PROCEDURE — 25000003 PHARM REV CODE 250: Performed by: COLON & RECTAL SURGERY

## 2018-08-06 PROCEDURE — 88309 TISSUE EXAM BY PATHOLOGIST: CPT | Mod: 26,,, | Performed by: PATHOLOGY

## 2018-08-06 PROCEDURE — 71000039 HC RECOVERY, EACH ADD'L HOUR: Performed by: COLON & RECTAL SURGERY

## 2018-08-06 PROCEDURE — 37000009 HC ANESTHESIA EA ADD 15 MINS: Performed by: COLON & RECTAL SURGERY

## 2018-08-06 PROCEDURE — 88302 TISSUE EXAM BY PATHOLOGIST: CPT | Mod: 26,,, | Performed by: PATHOLOGY

## 2018-08-06 PROCEDURE — 0DJD8ZZ INSPECTION OF LOWER INTESTINAL TRACT, VIA NATURAL OR ARTIFICIAL OPENING ENDOSCOPIC: ICD-10-PCS | Performed by: COLON & RECTAL SURGERY

## 2018-08-06 PROCEDURE — 71000033 HC RECOVERY, INTIAL HOUR: Performed by: COLON & RECTAL SURGERY

## 2018-08-06 RX ORDER — PROPOFOL 10 MG/ML
VIAL (ML) INTRAVENOUS
Status: DISCONTINUED | OUTPATIENT
Start: 2018-08-06 | End: 2018-08-06

## 2018-08-06 RX ORDER — ACETAMINOPHEN 10 MG/ML
1000 INJECTION, SOLUTION INTRAVENOUS EVERY 8 HOURS
Status: COMPLETED | OUTPATIENT
Start: 2018-08-06 | End: 2018-08-07

## 2018-08-06 RX ORDER — NEOSTIGMINE METHYLSULFATE 1 MG/ML
INJECTION, SOLUTION INTRAVENOUS
Status: DISCONTINUED | OUTPATIENT
Start: 2018-08-06 | End: 2018-08-06

## 2018-08-06 RX ORDER — FENTANYL CITRATE 50 UG/ML
INJECTION, SOLUTION INTRAMUSCULAR; INTRAVENOUS
Status: DISCONTINUED | OUTPATIENT
Start: 2018-08-06 | End: 2018-08-06

## 2018-08-06 RX ORDER — NALOXONE HCL 0.4 MG/ML
0.02 VIAL (ML) INJECTION
Status: DISCONTINUED | OUTPATIENT
Start: 2018-08-06 | End: 2018-08-11 | Stop reason: HOSPADM

## 2018-08-06 RX ORDER — LIDOCAINE HCL/PF 100 MG/5ML
SYRINGE (ML) INTRAVENOUS
Status: DISCONTINUED | OUTPATIENT
Start: 2018-08-06 | End: 2018-08-06

## 2018-08-06 RX ORDER — FENTANYL CITRATE 50 UG/ML
25 INJECTION, SOLUTION INTRAMUSCULAR; INTRAVENOUS EVERY 5 MIN PRN
Status: COMPLETED | OUTPATIENT
Start: 2018-08-06 | End: 2018-08-06

## 2018-08-06 RX ORDER — DEXAMETHASONE SODIUM PHOSPHATE 4 MG/ML
INJECTION, SOLUTION INTRA-ARTICULAR; INTRALESIONAL; INTRAMUSCULAR; INTRAVENOUS; SOFT TISSUE
Status: DISCONTINUED | OUTPATIENT
Start: 2018-08-06 | End: 2018-08-06

## 2018-08-06 RX ORDER — GLYCOPYRROLATE 0.2 MG/ML
INJECTION INTRAMUSCULAR; INTRAVENOUS
Status: DISCONTINUED | OUTPATIENT
Start: 2018-08-06 | End: 2018-08-06

## 2018-08-06 RX ORDER — METRONIDAZOLE 500 MG/100ML
500 INJECTION, SOLUTION INTRAVENOUS
Status: COMPLETED | OUTPATIENT
Start: 2018-08-06 | End: 2018-08-06

## 2018-08-06 RX ORDER — OXYCODONE HCL 5 MG/5 ML
5 SOLUTION, ORAL ORAL
Status: DISCONTINUED | OUTPATIENT
Start: 2018-08-06 | End: 2018-08-11 | Stop reason: HOSPADM

## 2018-08-06 RX ORDER — FLUTICASONE PROPIONATE 50 MCG
1 SPRAY, SUSPENSION (ML) NASAL 2 TIMES DAILY PRN
Status: DISCONTINUED | OUTPATIENT
Start: 2018-08-06 | End: 2018-08-11 | Stop reason: HOSPADM

## 2018-08-06 RX ORDER — MIDAZOLAM HYDROCHLORIDE 1 MG/ML
INJECTION, SOLUTION INTRAMUSCULAR; INTRAVENOUS
Status: DISCONTINUED | OUTPATIENT
Start: 2018-08-06 | End: 2018-08-06

## 2018-08-06 RX ORDER — CETIRIZINE HYDROCHLORIDE 10 MG/1
10 TABLET ORAL DAILY
Status: DISCONTINUED | OUTPATIENT
Start: 2018-08-07 | End: 2018-08-11 | Stop reason: HOSPADM

## 2018-08-06 RX ORDER — FENTANYL CITRATE 50 UG/ML
INJECTION, SOLUTION INTRAMUSCULAR; INTRAVENOUS
Status: DISPENSED
Start: 2018-08-06 | End: 2018-08-07

## 2018-08-06 RX ORDER — ONDANSETRON 2 MG/ML
INJECTION INTRAMUSCULAR; INTRAVENOUS
Status: DISCONTINUED | OUTPATIENT
Start: 2018-08-06 | End: 2018-08-06

## 2018-08-06 RX ORDER — HYDROMORPHONE HYDROCHLORIDE 1 MG/ML
0.5 INJECTION, SOLUTION INTRAMUSCULAR; INTRAVENOUS; SUBCUTANEOUS
Status: DISCONTINUED | OUTPATIENT
Start: 2018-08-06 | End: 2018-08-11 | Stop reason: HOSPADM

## 2018-08-06 RX ORDER — OXYCODONE HCL 5 MG/5 ML
10 SOLUTION, ORAL ORAL EVERY 4 HOURS PRN
Status: DISCONTINUED | OUTPATIENT
Start: 2018-08-06 | End: 2018-08-11 | Stop reason: HOSPADM

## 2018-08-06 RX ORDER — MUPIROCIN 20 MG/G
OINTMENT TOPICAL
Status: DISCONTINUED | OUTPATIENT
Start: 2018-08-06 | End: 2018-08-06 | Stop reason: HOSPADM

## 2018-08-06 RX ORDER — SODIUM CHLORIDE 9 MG/ML
INJECTION, SOLUTION INTRAVENOUS CONTINUOUS
Status: DISCONTINUED | OUTPATIENT
Start: 2018-08-06 | End: 2018-08-08

## 2018-08-06 RX ORDER — SODIUM CHLORIDE 9 MG/ML
INJECTION, SOLUTION INTRAVENOUS CONTINUOUS
Status: DISCONTINUED | OUTPATIENT
Start: 2018-08-06 | End: 2018-08-07

## 2018-08-06 RX ORDER — ENOXAPARIN SODIUM 100 MG/ML
40 INJECTION SUBCUTANEOUS EVERY 24 HOURS
Status: DISCONTINUED | OUTPATIENT
Start: 2018-08-07 | End: 2018-08-11 | Stop reason: HOSPADM

## 2018-08-06 RX ORDER — BUPIVACAINE HYDROCHLORIDE 2.5 MG/ML
INJECTION, SOLUTION EPIDURAL; INFILTRATION; INTRACAUDAL
Status: DISCONTINUED | OUTPATIENT
Start: 2018-08-06 | End: 2018-08-06 | Stop reason: HOSPADM

## 2018-08-06 RX ORDER — ONDANSETRON 2 MG/ML
4 INJECTION INTRAMUSCULAR; INTRAVENOUS EVERY 6 HOURS PRN
Status: DISCONTINUED | OUTPATIENT
Start: 2018-08-06 | End: 2018-08-11 | Stop reason: HOSPADM

## 2018-08-06 RX ORDER — HEPARIN SODIUM 5000 [USP'U]/ML
5000 INJECTION, SOLUTION INTRAVENOUS; SUBCUTANEOUS
Status: COMPLETED | OUTPATIENT
Start: 2018-08-06 | End: 2018-08-06

## 2018-08-06 RX ORDER — SODIUM CHLORIDE 0.9 % (FLUSH) 0.9 %
3 SYRINGE (ML) INJECTION
Status: DISCONTINUED | OUTPATIENT
Start: 2018-08-06 | End: 2018-08-11 | Stop reason: HOSPADM

## 2018-08-06 RX ORDER — ROCURONIUM BROMIDE 10 MG/ML
INJECTION, SOLUTION INTRAVENOUS
Status: DISCONTINUED | OUTPATIENT
Start: 2018-08-06 | End: 2018-08-06

## 2018-08-06 RX ORDER — ACETAMINOPHEN 10 MG/ML
1000 INJECTION, SOLUTION INTRAVENOUS
Status: COMPLETED | OUTPATIENT
Start: 2018-08-06 | End: 2018-08-06

## 2018-08-06 RX ADMIN — FENTANYL CITRATE 25 MCG: 50 INJECTION INTRAMUSCULAR; INTRAVENOUS at 12:08

## 2018-08-06 RX ADMIN — FENTANYL CITRATE 25 MCG: 50 INJECTION INTRAMUSCULAR; INTRAVENOUS at 11:08

## 2018-08-06 RX ADMIN — IBUPROFEN 800 MG: 800 INJECTION INTRAVENOUS at 06:08

## 2018-08-06 RX ADMIN — IBUPROFEN 800 MG: 800 INJECTION INTRAVENOUS at 11:08

## 2018-08-06 RX ADMIN — ROCURONIUM BROMIDE 5 MG: 10 INJECTION, SOLUTION INTRAVENOUS at 09:08

## 2018-08-06 RX ADMIN — SODIUM CHLORIDE: 0.9 INJECTION, SOLUTION INTRAVENOUS at 05:08

## 2018-08-06 RX ADMIN — IBUPROFEN 800 MG: 800 INJECTION INTRAVENOUS at 05:08

## 2018-08-06 RX ADMIN — OXYCODONE HYDROCHLORIDE 10 MG: 5 SOLUTION ORAL at 05:08

## 2018-08-06 RX ADMIN — ROCURONIUM BROMIDE 10 MG: 10 INJECTION, SOLUTION INTRAVENOUS at 08:08

## 2018-08-06 RX ADMIN — MUPIROCIN: 20 OINTMENT TOPICAL at 05:08

## 2018-08-06 RX ADMIN — ACETAMINOPHEN 1000 MG: 10 INJECTION, SOLUTION INTRAVENOUS at 02:08

## 2018-08-06 RX ADMIN — HYDROMORPHONE HYDROCHLORIDE 0.5 MG: 1 INJECTION, SOLUTION INTRAMUSCULAR; INTRAVENOUS; SUBCUTANEOUS at 02:08

## 2018-08-06 RX ADMIN — METRONIDAZOLE 500 MG: 500 SOLUTION INTRAVENOUS at 07:08

## 2018-08-06 RX ADMIN — SODIUM CHLORIDE, SODIUM GLUCONATE, SODIUM ACETATE, POTASSIUM CHLORIDE, MAGNESIUM CHLORIDE, SODIUM PHOSPHATE, DIBASIC, AND POTASSIUM PHOSPHATE: .53; .5; .37; .037; .03; .012; .00082 INJECTION, SOLUTION INTRAVENOUS at 07:08

## 2018-08-06 RX ADMIN — NEOSTIGMINE METHYLSULFATE 5 MG: 1 INJECTION INTRAVENOUS at 10:08

## 2018-08-06 RX ADMIN — CEFTRIAXONE 2 G: 2 INJECTION, SOLUTION INTRAVENOUS at 07:08

## 2018-08-06 RX ADMIN — HYDROMORPHONE HYDROCHLORIDE 0.5 MG: 1 INJECTION, SOLUTION INTRAMUSCULAR; INTRAVENOUS; SUBCUTANEOUS at 10:08

## 2018-08-06 RX ADMIN — OXYCODONE HYDROCHLORIDE 10 MG: 5 SOLUTION ORAL at 11:08

## 2018-08-06 RX ADMIN — ACETAMINOPHEN 1000 MG: 10 INJECTION, SOLUTION INTRAVENOUS at 05:08

## 2018-08-06 RX ADMIN — SODIUM CHLORIDE: 0.9 INJECTION, SOLUTION INTRAVENOUS at 11:08

## 2018-08-06 RX ADMIN — PROPOFOL 140 MG: 10 INJECTION, EMULSION INTRAVENOUS at 07:08

## 2018-08-06 RX ADMIN — ONDANSETRON 4 MG: 2 INJECTION INTRAMUSCULAR; INTRAVENOUS at 10:08

## 2018-08-06 RX ADMIN — ROCURONIUM BROMIDE 50 MG: 10 INJECTION, SOLUTION INTRAVENOUS at 07:08

## 2018-08-06 RX ADMIN — HYDROMORPHONE HYDROCHLORIDE 0.5 MG: 1 INJECTION, SOLUTION INTRAMUSCULAR; INTRAVENOUS; SUBCUTANEOUS at 06:08

## 2018-08-06 RX ADMIN — SODIUM CHLORIDE, SODIUM GLUCONATE, SODIUM ACETATE, POTASSIUM CHLORIDE, MAGNESIUM CHLORIDE, SODIUM PHOSPHATE, DIBASIC, AND POTASSIUM PHOSPHATE: .53; .5; .37; .037; .03; .012; .00082 INJECTION, SOLUTION INTRAVENOUS at 09:08

## 2018-08-06 RX ADMIN — FENTANYL CITRATE 50 MCG: 50 INJECTION, SOLUTION INTRAMUSCULAR; INTRAVENOUS at 07:08

## 2018-08-06 RX ADMIN — SODIUM CHLORIDE: 0.9 INJECTION, SOLUTION INTRAVENOUS at 07:08

## 2018-08-06 RX ADMIN — DEXAMETHASONE SODIUM PHOSPHATE 4 MG: 4 INJECTION, SOLUTION INTRAMUSCULAR; INTRAVENOUS at 07:08

## 2018-08-06 RX ADMIN — HEPARIN SODIUM 5000 UNITS: 5000 INJECTION, SOLUTION INTRAVENOUS; SUBCUTANEOUS at 05:08

## 2018-08-06 RX ADMIN — GLYCOPYRROLATE 0.6 MG: 0.2 INJECTION, SOLUTION INTRAMUSCULAR; INTRAVENOUS at 10:08

## 2018-08-06 RX ADMIN — MIDAZOLAM HYDROCHLORIDE 2 MG: 1 INJECTION, SOLUTION INTRAMUSCULAR; INTRAVENOUS at 07:08

## 2018-08-06 RX ADMIN — LIDOCAINE HYDROCHLORIDE 50 MG: 20 INJECTION, SOLUTION INTRAVENOUS at 07:08

## 2018-08-06 RX ADMIN — ACETAMINOPHEN 1000 MG: 10 INJECTION, SOLUTION INTRAVENOUS at 10:08

## 2018-08-06 NOTE — BRIEF OP NOTE
[facility]  Brief Operative Note    SUMMARY     Surgery Date: 8/6/2018     Surgeon(s) and Role:     * Thomas Kim MD - Primary     * Cosme Gomez MD - Resident - Assisting      Pre-op Diagnosis:  Malignant neoplasm of splenic flexure [C18.5]    Post-op Diagnosis:  Post-Op Diagnosis Codes:     * Malignant neoplasm of splenic flexure [C18.5]    Procedure(s) (LRB):  Lap sigmoid colectomy (N/A)    Anesthesia: General    Description of Procedure: Laparoscopic, hand-assisted sigmoid colectomy    Description of the findings of the procedure: tumor noted in mid-sigmoid; no evidence of intra-abdominal metastasis. Sigmoid colectomy performed with colorectal anastomosis    Estimated Blood Loss: * No values recorded between 8/6/2018  7:42 AM and 8/6/2018 11:07 AM *         Specimens:   Specimen (12h ago through future)    Start     Ordered    08/06/18 1023  Specimen to Pathology - Surgery  Once     Comments:  1.) proximal donut    -  PERMANENT2.) distal  donut    -  PERMANENT3.) sigmoid colon  - PERMANENT      08/06/18 1023

## 2018-08-06 NOTE — TRANSFER OF CARE
"Anesthesia Transfer of Care Note    Patient: Leni Henson    Procedure(s) Performed: Procedure(s) (LRB):  Lap sigmoid colectomy (N/A)    Patient location: PACU    Anesthesia Type: general    Transport from OR: Transported from OR on 6-10 L/min O2 by face mask with adequate spontaneous ventilation    Post pain: adequate analgesia    Post assessment: no apparent anesthetic complications    Post vital signs: stable    Level of consciousness: awake    Nausea/Vomiting: no nausea/vomiting    Complications: none    Transfer of care protocol was followed      Last vitals:   Visit Vitals  BP (!) 105/59 (BP Location: Right arm, Patient Position: Lying)   Pulse 70   Temp 36.5 °C (97.7 °F) (Axillary)   Resp 16   Ht 5' 6" (1.676 m)   Wt 79.4 kg (175 lb)   SpO2 100%   Breastfeeding? No   BMI 28.25 kg/m²     "

## 2018-08-06 NOTE — INTERVAL H&P NOTE
The patient has been examined and the H&P has been reviewed:    I concur with the findings and no changes have occurred since H&P was written.    Anesthesia/Surgery risks, benefits and alternative options discussed and understood by patient/family.          Active Hospital Problems    Diagnosis  POA    Malignant neoplasm of splenic flexure [C18.5]  Yes      Resolved Hospital Problems    Diagnosis Date Resolved POA   No resolved problems to display.

## 2018-08-06 NOTE — OP NOTE
DATE OF PROCEDURE:  08/06/2018    PREOPERATIVE DIAGNOSIS:  Malignant neoplasm of the sigmoid colon.    POSTOPERATIVE DIAGNOSIS:  Malignant neoplasm of the sigmoid colon.    PROCEDURES PERFORMED:  Laparoscopic sigmoid colectomy with colorectal   anastomosis and flexible sigmoidoscopy.    ATTENDING SURGEON:  Thomas Kim M.D.    RESIDENT:  Teofilo Gomez M.D. (RES)    ANESTHESIA:  General.    ESTIMATED BLOOD LOSS:  50 mL.    FINDINGS:  1.  Upon entry into the abdomen, her liver and peritoneum appeared free from any   metastatic disease.  2.  Stricturing malignant lesion in the mid sigmoid colon.  3.  Right and left ureters were carefully identified and protected.  4.  No leak on intraoperative leak testing.     SPECIMENS:  1.  Sigmoid colon.  2.  Proximal anastomotic donut.  3.  Distal anastomotic donut.    COMPLICATIONS:  None apparent.    DISPOSITION:  PACU, then admit for postoperative care.    INDICATIONS:  Ms. Henson is a 48-year-old woman who has had intermittent   left-sided abdominal pain associated with intermittent blood in her bowel   movements for the past 2-1/2 years.  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  She   underwent a staging workup and demonstrated no obvious evidence of metastatic   disease.  She was therefore counseled regarding colectomy and lymphadenectomy   for both resection and to complete her staging.  She understood the risks,   benefits and alternatives and wished to proceed with surgery.    DESCRIPTION OF PROCEDURE:  After informed consent was reviewed, the patient was   taken to the Operating Room, placed under general anesthesia.  Ceftriaxone and   Flagyl were given for preoperative antibiotics.  Rios catheter was sterilely   placed.  She was then placed into the lithotomy position and the arms were   appropriately padded and tucked.  The anterior abdominal wall was then prepped   and draped in  the usual sterile fashion.  A timeout was performed.  A   Pfannenstiel incision was made for placement of a hand port.  Approximately 2 cm   above the pubic symphysis, an 8 cm transverse incision was made in the skin.    Dissection continued down into the anterior rectus fascia.  The anterior rectus   fascia was incised transversely.  The decussation of the rectus muscles was then   grasped.  The peritoneum was entered sharply.  Care was taken to avoid injury   to any underlying structures.  The bladder was identified and was protected.    The peritoneum was incised from the pubic symphysis up to the umbilicus.  A   GelPort was then placed.  A 12 mm port was placed in the supraumbilical position   and two 5 mm ports were placed in both the right and left lower quadrant.  The   GelPort was then occluded and pneumoperitoneum was achieved.  A diagnostic   laparoscopy was then performed.  There was no obvious evidence of metastatic   disease.  The patient was placed into Trendelenburg and rolled to the patient's   right.  The small bowel and omentum were brought out of the pelvis.  The sacral   promontory was identified as a landmark to identify the superior rectal and   inferior mesenteric artery.  The superior rectal artery was grasped and the   window was made in the retrorectal space.  This continued proximally up to the   base of the aorta where the KAVON takeoff was.  The left ureter was identified and   swept posteriorly to develop a plane on top of the retroperitoneum.  A window   was made in the other side of the inferior mesenteric artery proximal to the   left colic takeoff.  The KAVON was then divided using the LigaSure.  This then   opened up the mesentery and retroperitoneum.  The retroperitoneum was swept down   out to the abdominal sidewall.  The colon was then grasped and brought   medially.  The lateral attachments to the colon were divided using the LigaSure.    I then stayed in this plane to complete  mobilization of the splenic flexure   using the LigaSure.  She did have some congenital attachments of the omentum off   the transverse colon to the descending colon.  These were carefully freed with   sharp dissection.  With the splenic flexure taken down, dissection continued   more distally into her pelvis.  Both the right and left side of the peritoneum   overlying the retrorectal space were incised.  At this point, the specimen was   adequately mobilized to allow for extraction and division.  The cap of the   GelPort was then removed.  The specimen was in the mid sigmoid colon and we came   easily out through the Pfannenstiel incision.  Distally, we continued   dissection down to the top of the rectum.  This was identified by the splaying   of the taenia coli, the absence of epiploica, and the passage of rectal sizers.    The mesorectum at this level was dissected posteriorly.  The intervening   mesentery was divided with the LigaSure with care to avoid injury to the   hypogastrics as well as bilateral ureters.  Proximally, the distal left colon   was chosen as the site of division.  This was approximately 8 cm away from the   tumor and allowed an adequate lymphadenectomy.  A window was made in the   mesentery just below the bowel.  The marginal artery was isolated at this level   and was flashed.  It was pulsatile at this level.  This was then clamped and   tied between 0 Vicryl ties.  The distal left colon was then grasped with Furness   pursestring clamp.  A 2-0 nylon was passed as a pursestring and the distal side   was divided sharply after clamping with a Kocher clamp.  The pursestring clamp   was removed and the bowel was opened and appeared pink and healthy.  A 29 size   EEA anvil was then inserted.  The nylon was tied down to form a pursestring.  A   3-0 PDS was then placed around this as a second pursestring suture for   reinforcement.  This was then packed in the upper abdomen.  Distally, the top  of   the rectum was again identified.  The colon was divided at this level with a   contour with a green load.  Specimen was then removed from the field.  On the   back table, the specimen was inspected, the retrorectal dissection appeared to   have a complete mesorectum at this level of division.      Stay stitches were then placed on either side of the divided rectum.  I then   went to pass the sizers and the stapler.  The sizer was passed first for   orientation followed by a 29 EEA stapler.  This abutted the staple line well.    The spike was deployed just posterior to the staple line in an area that had   already been cleared of its mesorectum.  The spike was attached to the anvil in   the distal left colon and the two were brought together with care to avoid   entrapment of any surrounding organs.  The mesentery was inspected and found to   be straight without any twisting.  The EEA stapler was then fired and removed.    Two circumferential donuts were seen on the back table.  Intraoperative leak   testing was then performed.  The pelvis was filled with water and a flexible   endoscope was inserted through the anal canal.  The anastomosis appeared healthy   and intact.  The bowel above the anastomosis appeared pink and healthy.  There   was no leak on leak testing.  The scope was then removed.  All members of the   team then changed gowns and gloves and staple line was then circumferentially   oversewn with 3-0 Vicryl sutures.  The omentum was grasped and brought down to   the pelvis.  The abdomen was inspected and hemostasis was again noted.  All   trocars were then removed.  The umbilical trocar was closed in layers.  The   fascia was closed with a 0 Vicryl figure-of-eight suture.  The Pfannenstiel was   also closed in layers.  The posterior fascia and peritoneum were closed with #1   running PDS.  Two simple #1 PDS sutures were placed on the muscle to   reapproximate the muscle and prevent diastasis.  The  anterior rectus fascia was   closed with #1 running PDS.    At the beginning of the case, 50 mL of 0.25% Marcaine with Exparel were injected   bilaterally under direct visualization as TAP blocks.    All skin incisions were irrigated.  All skin was closed with 4-0 Vicryl in   subcuticular fashion and sterile dressings were applied.  The patient tolerated   the procedure well.  There were no apparent intraoperative complications.  All   sponge, needle, and instrument counts were correct x2.  She was extubated and   taken to PACU in stable condition.      MARU/IN  dd: 08/06/2018 12:01:20 (CDT)  td: 08/06/2018 12:57:05 (CDT)  Doc ID   #[id number]  Job ID #[id number]    CC:

## 2018-08-06 NOTE — NURSING TRANSFER
Nursing Transfer Note      8/6/2018     Transfer To: 632    Transfer via stretcher    Transfer with iv pump    Transported by pct    Medicines sent: yes    Chart send with patient: Yes    Notified: sister

## 2018-08-07 LAB
ANION GAP SERPL CALC-SCNC: 5 MMOL/L
BASOPHILS # BLD AUTO: 0.01 K/UL
BASOPHILS NFR BLD: 0.1 %
BUN SERPL-MCNC: 5 MG/DL
CALCIUM SERPL-MCNC: 8.2 MG/DL
CHLORIDE SERPL-SCNC: 108 MMOL/L
CO2 SERPL-SCNC: 23 MMOL/L
CREAT SERPL-MCNC: 0.8 MG/DL
DIFFERENTIAL METHOD: ABNORMAL
EOSINOPHIL # BLD AUTO: 0 K/UL
EOSINOPHIL NFR BLD: 0 %
ERYTHROCYTE [DISTWIDTH] IN BLOOD BY AUTOMATED COUNT: 18.5 %
EST. GFR  (AFRICAN AMERICAN): >60 ML/MIN/1.73 M^2
EST. GFR  (NON AFRICAN AMERICAN): >60 ML/MIN/1.73 M^2
GLUCOSE SERPL-MCNC: 105 MG/DL
HCT VFR BLD AUTO: 30.1 %
HGB BLD-MCNC: 8.4 G/DL
IMM GRANULOCYTES # BLD AUTO: 0.03 K/UL
IMM GRANULOCYTES NFR BLD AUTO: 0.3 %
LYMPHOCYTES # BLD AUTO: 1.6 K/UL
LYMPHOCYTES NFR BLD: 13.5 %
MAGNESIUM SERPL-MCNC: 1.9 MG/DL
MCH RBC QN AUTO: 21.2 PG
MCHC RBC AUTO-ENTMCNC: 27.9 G/DL
MCV RBC AUTO: 76 FL
MONOCYTES # BLD AUTO: 0.9 K/UL
MONOCYTES NFR BLD: 7.4 %
NEUTROPHILS # BLD AUTO: 9.1 K/UL
NEUTROPHILS NFR BLD: 78.7 %
NRBC BLD-RTO: 0 /100 WBC
PHOSPHATE SERPL-MCNC: 3.1 MG/DL
PLATELET # BLD AUTO: 276 K/UL
PMV BLD AUTO: 9.1 FL
POTASSIUM SERPL-SCNC: 3.5 MMOL/L
RBC # BLD AUTO: 3.96 M/UL
SODIUM SERPL-SCNC: 136 MMOL/L
WBC # BLD AUTO: 11.5 K/UL

## 2018-08-07 PROCEDURE — 25000003 PHARM REV CODE 250: Performed by: STUDENT IN AN ORGANIZED HEALTH CARE EDUCATION/TRAINING PROGRAM

## 2018-08-07 PROCEDURE — 63600175 PHARM REV CODE 636 W HCPCS: Performed by: STUDENT IN AN ORGANIZED HEALTH CARE EDUCATION/TRAINING PROGRAM

## 2018-08-07 PROCEDURE — 20600001 HC STEP DOWN PRIVATE ROOM

## 2018-08-07 PROCEDURE — 83735 ASSAY OF MAGNESIUM: CPT

## 2018-08-07 PROCEDURE — 80048 BASIC METABOLIC PNL TOTAL CA: CPT

## 2018-08-07 PROCEDURE — 84100 ASSAY OF PHOSPHORUS: CPT

## 2018-08-07 PROCEDURE — 97165 OT EVAL LOW COMPLEX 30 MIN: CPT

## 2018-08-07 PROCEDURE — G8987 SELF CARE CURRENT STATUS: HCPCS | Mod: CK

## 2018-08-07 PROCEDURE — 85025 COMPLETE CBC W/AUTO DIFF WBC: CPT

## 2018-08-07 PROCEDURE — G8988 SELF CARE GOAL STATUS: HCPCS | Mod: CI

## 2018-08-07 PROCEDURE — 97161 PT EVAL LOW COMPLEX 20 MIN: CPT

## 2018-08-07 RX ADMIN — CETIRIZINE HYDROCHLORIDE 10 MG: 10 TABLET, FILM COATED ORAL at 09:08

## 2018-08-07 RX ADMIN — SODIUM CHLORIDE: 0.9 INJECTION, SOLUTION INTRAVENOUS at 09:08

## 2018-08-07 RX ADMIN — OXYCODONE HYDROCHLORIDE 10 MG: 5 SOLUTION ORAL at 08:08

## 2018-08-07 RX ADMIN — IBUPROFEN 800 MG: 800 INJECTION INTRAVENOUS at 12:08

## 2018-08-07 RX ADMIN — OXYCODONE HYDROCHLORIDE 10 MG: 5 SOLUTION ORAL at 03:08

## 2018-08-07 RX ADMIN — ACETAMINOPHEN 1000 MG: 10 INJECTION, SOLUTION INTRAVENOUS at 06:08

## 2018-08-07 RX ADMIN — ENOXAPARIN SODIUM 40 MG: 100 INJECTION SUBCUTANEOUS at 04:08

## 2018-08-07 RX ADMIN — IBUPROFEN 800 MG: 800 INJECTION INTRAVENOUS at 05:08

## 2018-08-07 RX ADMIN — HYDROMORPHONE HYDROCHLORIDE 0.5 MG: 1 INJECTION, SOLUTION INTRAMUSCULAR; INTRAVENOUS; SUBCUTANEOUS at 11:08

## 2018-08-07 RX ADMIN — OXYCODONE HYDROCHLORIDE 10 MG: 5 SOLUTION ORAL at 07:08

## 2018-08-07 RX ADMIN — IBUPROFEN 800 MG: 800 INJECTION INTRAVENOUS at 11:08

## 2018-08-07 RX ADMIN — HYDROMORPHONE HYDROCHLORIDE 0.5 MG: 1 INJECTION, SOLUTION INTRAMUSCULAR; INTRAVENOUS; SUBCUTANEOUS at 03:08

## 2018-08-07 NOTE — PLAN OF CARE
Problem: Patient Care Overview  Goal: Plan of Care Review  Outcome: Ongoing (interventions implemented as appropriate)  Pt A & O x 4, adequate urine output via commode, pain semi-controlled with prn pain meds.  Pt walked to bathroom and cleaned up.  Vital signs stable on room air.

## 2018-08-07 NOTE — PLAN OF CARE
[place] Not medically stable for discharge;POD # 1: waiting for patient to pass gas. Patient is independent;No needs determined.     [facility] Packet given to patient after informed about it;patient verbalized their understanding.        08/07/18 1220   Discharge Assessment   Assessment Type Discharge Planning Assessment   Confirmed/corrected address and phone number on facesheet? Yes   Assessment information obtained from? Patient;Medical Record   Expected Length of Stay (days) (3)   Communicated expected length of stay with patient/caregiver no  (Per MD)   Prior to hospitilization cognitive status: Alert/Oriented;No Deficits   Prior to hospitalization functional status: Independent   Current cognitive status: Alert/Oriented;No Deficits   Current Functional Status: Independent;Needs Assistance   Facility Arrived From: (N/A)   Lives With alone   Able to Return to Prior Arrangements yes   Is patient able to care for self after discharge? Yes   Who are your caregiver(s) and their phone number(s)? (Laxmi Henson Mother [phone]. Mother is in [place], [place]. Planning to return Friday. )   Patient's perception of discharge disposition home or selfcare  (PT/OT recs pending/Possible barrier: Insurer did not approve this surgery;Was escalated. )   Readmission Within The Last 30 Days no previous admission in last 30 days   Patient currently being followed by outpatient case management? No   Patient currently receives any other outside agency services? No   Equipment Currently Used at Home none   Do you have any problems affording any of your prescribed medications? No   Is the patient taking medications as prescribed? yes   Does the patient have transportation home? Yes   Transportation Available car;family or friend will provide   Dialysis Name and Scheduled days (N/A)   Does the patient receive services at the [facility]madin Clinic? No   Discharge Plan A Home with family   Discharge  Plan B Home with family   Patient/Family In Agreement With Plan yes

## 2018-08-07 NOTE — PLAN OF CARE
Problem: Occupational Therapy Goal  Goal: Occupational Therapy Goal  Goals to be met by: 8/14/18     Patient will increase functional independence with ADLs by performing:    UE Dressing with [place].  LE Dressing with [place].  Grooming while standing at sink with Supervision.  Toileting from toilet with Supervision for hygiene and clothing management.   Toilet transfer to toilet with Supervision.    Outcome: Ongoing (interventions implemented as appropriate)  Eval completed; goals established    Comments: Initiate OT NITHYA Ocampo OT  8/7/2018

## 2018-08-07 NOTE — SUBJECTIVE & OBJECTIVE
Subjective:     Interval History: no acute events overnite, no n/v, adequate pain control with current plan, no flatus or bm      Post-Op Info:  Procedure(s) (LRB):  Lap sigmoid colectomy (N/A)   1 Day Post-Op      Medications:  Continuous Infusions:   sodium chloride 0.9% 50 mL/hr at 08/07/18 0930     Scheduled Meds:   cetirizine  10 mg Oral Daily    enoxaparin  40 mg Subcutaneous Daily    ibuprofen  800 mg Intravenous Q6H     PRN Meds:   fluticasone    HYDROmorphone    naloxone    ondansetron    oxyCODONE    oxyCODONE    promethazine (PHENERGAN) IVPB    sodium chloride 0.9%        Objective:     Vital Signs (Most Recent):  Temp: 98.3 °F (36.8 °C) (08/07/18 1154)  Pulse: (!) 58 (08/07/18 1154)  Resp: 18 (08/07/18 1154)  BP: (!) 104/58 (08/07/18 1154)  SpO2: 99 % (08/07/18 1154) Vital Signs (24h Range):  Temp:  [96.7 °F (35.9 °C)-98.3 °F (36.8 °C)] 98.3 °F (36.8 °C)  Pulse:  [56-61] 58  Resp:  [16-20] 18  SpO2:  [98 %-100 %] 99 %  BP: ()/(53-69) 104/58     Intake/Output - Last 3 Shifts       08/05 0700 - 08/06 0659 08/06 0700 - 08/07 0659 08/07 0700 - 08/08 0659    P.O.  210     I.V. (mL/kg)  1780 (22.4)     IV Piggyback  100     Total Intake(mL/kg)  2090 (26.3)     Urine (mL/kg/hr)  1950 (1)     Total Output   1950      Net   +140                   Physical Exam   Constitutional: She is oriented to person, place, and time. She appears well-developed and well-nourished.   HENT:   Head: Normocephalic.   Eyes: Pupils are equal, round, and reactive to light.   Cardiovascular: Normal rate, regular rhythm and normal heart sounds.    Pulmonary/Chest: Effort normal and breath sounds normal. No respiratory distress. She has no wheezes. She has no rales.   Abdominal: Soft. She exhibits no mass. There is no rebound and no guarding.   abd soft, appropriate tenderness  Lower pannus inc line dsg dry and intact   Neurological: She is alert and oriented to person, place, and time.   Skin: Skin is warm and dry.    Psychiatric: She has a normal mood and affect. Her behavior is normal. Judgment and thought content normal.   Nursing note and vitals reviewed.          Significant Labs:  BMP (Last 3 Results):   Recent Labs  Lab 08/07/18  0424         K 3.5      CO2 23   BUN 5*   CREATININE 0.8   CALCIUM 8.2*   MG 1.9     CBC (Last 3 Results):   Recent Labs  Lab 08/07/18 0424   WBC 11.50   RBC 3.96*   HGB 8.4*   HCT 30.1*      MCV 76*   MCH 21.2*   MCHC 27.9*       Significant Diagnostics:  None

## 2018-08-07 NOTE — NURSING
Patient arrived to unit via stretcher with PCT. AAOX4, VSS. Complaints of pain, medicated with prn meds. SCD's on pt. Family at bedside. Will monitor.

## 2018-08-07 NOTE — PROGRESS NOTES
[facility]  Colorectal Surgery  Progress Note    Patient Name: Leni Henson  MRN: [id number]  Admission Date: 8/6/2018  Hospital Length of Stay: 1 days  Attending Physician: Thomas Kim MD    Subjective:     Interval History: no acute events overnight. Pain controlled. Tolerating clears.    Post-Op Info:  Procedure(s) (LRB):  Lap sigmoid colectomy (N/A)   1 Day Post-Op      Medications:  Continuous Infusions:   sodium chloride 0.9% 50 mL/hr at 08/06/18 1132     Scheduled Meds:   cetirizine  10 mg Oral Daily    enoxaparin  40 mg Subcutaneous Daily    ibuprofen  800 mg Intravenous Q6H     PRN Meds:   fluticasone    HYDROmorphone    naloxone    ondansetron    oxyCODONE    oxyCODONE    promethazine (PHENERGAN) IVPB    sodium chloride 0.9%        Objective:     Vital Signs (Most Recent):  Temp: 96.7 °F (35.9 °C) (08/07/18 0749)  Pulse: (!) 58 (08/07/18 0749)  Resp: 17 (08/07/18 0749)  BP: 110/69 (08/07/18 0749)  SpO2: 99 % (08/07/18 0749) Vital Signs (24h Range):  Temp:  [96.7 °F (35.9 °C)-98 °F (36.7 °C)] 96.7 °F (35.9 °C)  Pulse:  [56-79] 58  Resp:  [12-20] 17  SpO2:  [98 %-100 %] 99 %  BP: ()/(52-69) 110/69     Intake/Output - Last 3 Shifts       08/05 0700 - 08/06 0659 08/06 0700 - 08/07 0659 08/07 0700 - 08/08 0659    P.O.  210     I.V. (mL/kg)  1780 (22.4)     IV Piggyback  100     Total Intake(mL/kg)  2090 (26.3)     Urine (mL/kg/hr)  1950 (1)     Total Output   1950      Net   +140                   Physical Exam   Constitutional: She is oriented to person, place, and time. She appears well-developed and well-nourished. No distress.   HENT:   Head: Normocephalic and atraumatic.   Eyes: Conjunctivae are normal.   Neck: Normal range of motion. Neck supple.   Cardiovascular: Normal rate and regular rhythm.    Pulmonary/Chest: Effort normal. No respiratory distress.   Abdominal: Soft. She exhibits no distension and no mass. There is tenderness (incisional). There is no  guarding.   Dressings c/d/i   Neurological: She is alert and oriented to person, place, and time.   Skin: Skin is warm and dry. She is not diaphoretic.     Significant Labs:  BMP (Last 3 Results):   Recent Labs  Lab 08/07/18 0424         K 3.5      CO2 23   BUN 5*   CREATININE 0.8   CALCIUM 8.2*   MG 1.9     CBC (Last 3 Results):   Recent Labs  Lab 08/07/18 0424   WBC 11.50   RBC 3.96*   HGB 8.4*   HCT 30.1*      MCV 76*   MCH 21.2*   MCHC 27.9*       Significant Diagnostics:  None    Assessment/Plan:     Cancer of sigmoid colon    1 Day Post-Op sigmoid colectomy, doing well    Diet as tolerated  DC green, void trial  Pain control as needed - liquid medications (doesn't swallow pills)  OOB/ambulate  IS use  DVT ppx - lovenox              Fady Graham MD  Colorectal Surgery  [facility]

## 2018-08-07 NOTE — ASSESSMENT & PLAN NOTE
1 Day Post-Op sigmoid colectomy, doing well    Diet as tolerated  DC green, void trial  Pain control as needed - liquid medications (doesn't swallow pills)  OOB/ambulate  IS use  DVT ppx - lovenox

## 2018-08-07 NOTE — PLAN OF CARE
Problem: Patient Care Overview  Goal: Plan of Care Review  Outcome: Ongoing (interventions implemented as appropriate)  POC reviewed w/ pt.  Pt verbalized understanding.  AAOx4.  Pain managed throughout shift w/ prn meds.  Adequate urine output.  Pt remains free of falls and injuries.  No acute distress noted.  WCTM.

## 2018-08-07 NOTE — SUBJECTIVE & OBJECTIVE
Subjective:     Interval History: no acute events overnight. Pain controlled. Tolerating clears.    Post-Op Info:  Procedure(s) (LRB):  Lap sigmoid colectomy (N/A)   1 Day Post-Op      Medications:  Continuous Infusions:   sodium chloride 0.9% 50 mL/hr at 08/06/18 1132     Scheduled Meds:   cetirizine  10 mg Oral Daily    enoxaparin  40 mg Subcutaneous Daily    ibuprofen  800 mg Intravenous Q6H     PRN Meds:   fluticasone    HYDROmorphone    naloxone    ondansetron    oxyCODONE    oxyCODONE    promethazine (PHENERGAN) IVPB    sodium chloride 0.9%        Objective:     Vital Signs (Most Recent):  Temp: 96.7 °F (35.9 °C) (08/07/18 0749)  Pulse: (!) 58 (08/07/18 0749)  Resp: 17 (08/07/18 0749)  BP: 110/69 (08/07/18 0749)  SpO2: 99 % (08/07/18 0749) Vital Signs (24h Range):  Temp:  [96.7 °F (35.9 °C)-98 °F (36.7 °C)] 96.7 °F (35.9 °C)  Pulse:  [56-79] 58  Resp:  [12-20] 17  SpO2:  [98 %-100 %] 99 %  BP: ()/(52-69) 110/69     Intake/Output - Last 3 Shifts       08/05 0700 - 08/06 0659 08/06 0700 - 08/07 0659 08/07 0700 - 08/08 0659    P.O.  210     I.V. (mL/kg)  1780 (22.4)     IV Piggyback  100     Total Intake(mL/kg)  2090 (26.3)     Urine (mL/kg/hr)  1950 (1)     Total Output   1950      Net   +140                   Physical Exam   Constitutional: She is oriented to person, place, and time. She appears well-developed and well-nourished. No distress.   HENT:   Head: Normocephalic and atraumatic.   Eyes: Conjunctivae are normal.   Neck: Normal range of motion. Neck supple.   Cardiovascular: Normal rate and regular rhythm.    Pulmonary/Chest: Effort normal. No respiratory distress.   Abdominal: Soft. She exhibits no distension and no mass. There is tenderness (incisional). There is no guarding.   Dressings c/d/i   Neurological: She is alert and oriented to person, place, and time.   Skin: Skin is warm and dry. She is not diaphoretic.     Significant Labs:  BMP (Last 3 Results):   Recent Labs  Lab  08/07/18 0424         K 3.5      CO2 23   BUN 5*   CREATININE 0.8   CALCIUM 8.2*   MG 1.9     CBC (Last 3 Results):   Recent Labs  Lab 08/07/18 0424   WBC 11.50   RBC 3.96*   HGB 8.4*   HCT 30.1*      MCV 76*   MCH 21.2*   MCHC 27.9*       Significant Diagnostics:  None

## 2018-08-07 NOTE — PLAN OF CARE
Problem: Physical Therapy Goal  Goal: Physical Therapy Goal  Goals to be met by: 1 week ()     Patient will increase functional independence with mobility by performin. Supine to sit with Modified [place].  2. Sit to supine with Modified [place].  3. Sit to stand transfer with supervision.  4. Bed to chair transfer with Supervision using Rolling Walker.  5. Gait  x 150 feet with Supervision using Rolling Walker.   6. Stand for 3 minutes with Modified [place] while performing a task (exercises, ADLs, etc).  7. Pt to negotiate 1 flight with 1 HR, CGA (as pt has a flight to enter her bedroom at home).   Outcome: Ongoing (interventions implemented as appropriate)  Goals developed based on eval and prior level of function.

## 2018-08-07 NOTE — PLAN OF CARE
SW following for d/c needs.  Pt expected to d/c home with no needs.           Anthony Cornejo, MSW, LCSW  [facility]  [id number]

## 2018-08-07 NOTE — PT/OT/SLP EVAL
Physical Therapy Evaluation and treatment    Patient Name:  Leni Henson   MRN:  [id number]    Recommendations:     Discharge Recommendations:  home   Discharge Equipment Recommendations: none   Barriers to discharge: Decreased caregiver support- lives alone but family coming in from [place], [place].  Assessment:     Leni Henson is a 48 y.o. female admitted with a medical diagnosis of Malignant neoplasm of splenic flexure.  She presents with the following impairments/functional limitations:  impaired endurance, impaired functional mobilty, pain.  Pt will benefit from continued PT services to ensure safety upon return home, as she is mostly pain limited at this time.r    Rehab Prognosis:  good; patient would benefit from acute skilled PT services to address these deficits and reach maximum level of function.      Recent Surgery: Procedure(s) (LRB):  Lap sigmoid colectomy (N/A) 1 Day Post-Op    Plan:     During this hospitalization, patient to be seen 3 x/week to address the above listed problems via gait training, therapeutic activities, therapeutic exercises  · Plan of Care Expires:  09/06/18   Plan of Care Reviewed with: patient    Subjective     Communicated with nurse prior to session.  Patient found supine upon PT entry to room, agreeable to evaluation.      Chief Complaint: Pain at incision site  Patient comments/goals: Return home/work  Pain/Comfort:  · Pain Rating 1: 9/10  · Location - Side 1: Bilateral  · Location - Orientation 1: generalized  · Location 1: abdomen  · Pain Addressed 1: Reposition  · Pain Rating Post-Intervention 1: 9/10    Patients cultural, spiritual, Church conflicts given the current situation: none reported    Living Environment:  Lives alone in 1 ,  [facility] (1 flight to enter bedroom). HR on one side going up. Tub-shower with no seat, cook/cleans her house. Works at a nursing home. Pt is right handed.  Prior to admission, patients level of function was independent.  Patient has  the following equipment: none.  DME owned (not currently used): none.  Upon discharge, patient will have assistance from family.    Objective:     Patient found with: peripheral IV     General Precautions: Standard, fall   Orthopedic Precautions:    Braces:       Exams:  · Cognitive Exam:  Patient is oriented to Person, Place, Time and Situation and follows 100% of all commands    · BUE- WFL  · BLE- limited (3+/5) by pain in lower abdominal region.    Functional Mobility:  · Bed Mobility:  Supine to Sit: minimum assistance  · Sit to Supine: minimum assistance  · Transfers:  Sit to Stand:  contact guard assistance and minimum assistance with no AD  · Gait: 10 feet x 2 trials with hand-held assistance (CGA). No loss of balance.  · Balance: Able to stand with CGA and hand-held assistance.    AM-PAC 6 CLICK MOBILITY  Total Score:17       Therapeutic Activities and Exercises:   No exercise performed today due to pain.    Patient left supine with call button in reach.    GOALS:    Physical Therapy Goals        Problem: Physical Therapy Goal    Goal Priority Disciplines Outcome Goal Variances Interventions   Physical Therapy Goal     PT/OT, PT Ongoing (interventions implemented as appropriate)     Description:  Goals to be met by: 1 week ()     Patient will increase functional independence with mobility by performin. Supine to sit with Modified [place].  2. Sit to supine with Modified [place].  3. Sit to stand transfer with supervision.  4. Bed to chair transfer with Supervision using Rolling Walker.  5. Gait  x 150 feet with Supervision using Rolling Walker.   6. Stand for 3 minutes with Modified [place] while performing a task (exercises, ADLs, etc).   7. Pt to negotiate 1 flight with 1 HR, CGA (as pt has a flight to enter her bedroom at home).                    History:     Past Medical History:   Diagnosis Date    Malignant neoplasm of splenic flexure 2018       Past Surgical History:    Procedure Laterality Date    CHOLECYSTECTOMY  2015    laparoscopic    COLONOSCOPY N/A 7/25/2018    Procedure: COLONOSCOPY;  Surgeon: Rashad Stahl MD;  Location: [facility];  Service: Endoscopy;  Laterality: N/A;    HYSTERECTOMY  2010    LAPAROSCOPIC SIGMOIDECTOMY N/A 8/6/2018    Procedure: Lap sigmoid colectomy;  Surgeon: Thomas Kim MD;  Location: [facility] OR [address] [place] [address];  Service: Colon and Rectal;  Laterality: N/A;    TUBAL LIGATION         Clinical Decision Making:     History  Co-morbidities and personal factors that may impact the plan of care Examination  Body Structures and Functions, activity limitations and participation restrictions that may impact the plan of care Clinical Presentation   Decision Making/ Complexity Score   Co-morbidities:   [] Time since onset of injury / illness / exacerbation  [] Status of current condition  []Patient's cognitive status and safety concerns    [] Multiple Medical Problems (see med hx)  Personal Factors:   [] Patient's age  [] Prior Level of function   [x] Patient's home situation (environment and family support)  [] Patient's level of motivation  [] Expected progression of patient      HISTORY:(criteria)    [] 28857 - no personal factors/history    [x] 03782 - has 1-2 personal factor/comorbidity     [] 75728 - has >3 personal factor/comorbidity     Body Regions:  [] Objective examination findings  [] Head     []  Neck  [] Trunk   [] Upper Extremity  [] Lower Extremity    Body Systems:  [] For communication ability, affect, cognition, language, and learning style: the assessment of the ability to make needs known, consciousness, orientation (person, place, and time), expected emotional /behavioral responses, and learning preferences (eg, learning barriers, education  needs)  [] For the neuromuscular system: a general assessment of gross coordinated movement (eg, balance, gait, locomotion, transfers, and transitions) and motor function  (motor control and  motor learning)  [x] For the musculoskeletal system: the assessment of gross symmetry, gross range of motion, gross strength, height, and weight  [] For the integumentary system: the assessment of pliability(texture), presence of scar formation, skin color, and skin integrity  [] For cardiovascular/pulmonary system: the assessment of heart rate, respiratory rate, blood pressure, and edema     Activity limitations:    [] Patient's cognitive status and saf ety concerns          [] Status of current condition      [] Weight bearing restriction  [] Cardiopulmunary Restriction    Participation Restrictions:   [] Goals and goal agreement with the patient     [] Rehab potential (prognosis) and probable outcome      Examination of Body System: (criteria)    [x] 06014 - addressing 1-2 elements    [] 41905 - addressing a total of 3 or more elements     [] 42813 -  Addressing a total of 4 or more elements         Clinical Presentation: (criteria)  Stable - 64200     On examination of body system using standardized tests and measures patient presents with 1-2 elements from any of the following: body structures and functions, activity limitations, and/or participation restrictions.  Leading to a clinical presentation that is considered stable and/or uncomplicated                              Clinical Decision Making  (Eval Complexity):  Low- 45757     Time Tracking:     PT Received On: 08/07/18  PT Start Time: 1040     PT Stop Time: 1057  PT Total Time (min): 17 min     Billable Minutes: Evaluation 17      Karen Mercado, PT  08/07/2018

## 2018-08-07 NOTE — PT/OT/SLP EVAL
Occupational Therapy   Evaluation    Name: Leni Henson  MRN: [id number]  Admitting Diagnosis:  Malignant neoplasm of splenic flexure 1 Day Post-Op    Recommendations:     Discharge Recommendations: home  Discharge Equipment Recommendations:  none  Barriers to discharge:  Decreased caregiver support    History:     Occupational Profile:  Living Environment: Pt lives alone in 2 story [facility] with 0STE. Home has 15 steps with R/LHR to reach second floor. Pt bed/bath on second floor. Pt has tub/shower  Previous level of function: PTA, pt reports independence with all ADL and IADL, including driving. 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 Pt was not using any AD for ambulation or ADL  Roles and Routines: supervisior  Equipment Owned:  none  Assistance upon Discharge: Pt reports family coming in town to assist as needed    Past Medical History:   Diagnosis Date    Malignant neoplasm of splenic flexure 7/27/2018       Past Surgical History:   Procedure Laterality Date    CHOLECYSTECTOMY  2015    laparoscopic    COLONOSCOPY N/A 7/25/2018    Procedure: COLONOSCOPY;  Surgeon: Rashad Stahl MD;  Location: [facility];  Service: Endoscopy;  Laterality: N/A;    HYSTERECTOMY  2010    LAPAROSCOPIC SIGMOIDECTOMY N/A 8/6/2018    Procedure: Lap sigmoid colectomy;  Surgeon: Thomas Kim MD;  Location: [address];  Service: Colon and Rectal;  Laterality: N/A;    TUBAL LIGATION         Subjective     Chief Complaint: pain  Patient/Family stated goals: pain relief  Communicated with: RN prior to session.  Pain/Comfort:  · Pain Rating 1: 9/10  · Location - Side 1: Bilateral  · Location - Orientation 1: lower  · Location 1: abdomen  · Pain Addressed 1: Reposition, Distraction, Cessation of Activity, Nurse notified  · Pain Rating Post-Intervention 1: 10/10    Patients cultural, spiritual, Mosque conflicts given the current situation: none reported    Objective:     Patient found with: peripheral  IV    General Precautions: Standard, fall   Orthopedic Precautions:N/A   Braces: N/A     Occupational Performance:    Bed Mobility:    · Patient completed Supine to Sit with minimum assistance, with side rail and elevated HOB and increased time  · Patient completed Sit to Supine with minimum assistance    Functional Mobility/Transfers:  · Patient completed Sit <> Stand Transfer with minimum assistance  with  no assistive device   · Functional Mobility: Pt amulate 20 ft win room with CGA using no AD    Activities of Daily Living:  · Upper Body Dressing: setup assistance to don robe while seated    Cognitive/Visual Perceptual:  Cognitive/Psychosocial Skills:     -       Oriented to: Person, Place, Time and Situation   -       Follows Commands/attention:Follows multistep  commands  -       Communication: clear/fluent  -       Memory: No Deficits noted  -       Safety awareness/insight to disability: intact   -       Mood/Affect/Coping skills/emotional control: Appropriate to situation  Visual/Perceptual:      -Pt wears glasses    Physical Exam:  Balance:    -       good sitting/standing balance  Postural examination/scapula alignment:    -       Rounded shoulders  -       Forward head  Skin integrity: Visible skin intact  Edema:  None noted  Sensation:    -       Intact  Dominant hand:    -       R hand  Upper Extremity Range of Motion:     -       Right Upper Extremity: WFL  -       Left Upper Extremity: WFL  Upper Extremity Strength:    -       Right Upper Extremity: WFL  -       Left Upper Extremity: WFL   Strength:    -       Right Upper Extremity: WFL  -       Left Upper Extremity: WFL  Fine Motor Coordination:    -       Intact  Gross motor coordination:   WFL    Patient left supine with all lines intact, call button in reach and RN notified    AMPA 6 Click:  AMPA Total Score: 16    Treatment & Education:  Pt educated on role of OT/POC  Pt educated on importance of ambulation/UIC  White board/communication  "board updated  Education:    Assessment:     Leni Henson is a 48 y.o. female with a medical diagnosis of Malignant neoplasm of splenic flexure.  She presents with pain limiting functional mobility and self care tasks at time of eval today.  Performance deficits affecting function are impaired functional mobilty, impaired endurance, weakness, impaired self care skills, gait instability, impaired balance, pain.      Rehab Prognosis:  Good; patient would benefit from acute skilled OT services to address these deficits and reach maximum level of function.         Clinical Decision Makin.  OT Low:  "Pt evaluation falls under low complexity for evaluation coding due to performance deficits noted in 1-3 areas as stated above and 0 co-morbities affecting current functional status. Data obtained from problem focused assessments. No modifications or assistance was required for completion of evaluation. Only brief occupational profile and history review completed."     Plan:     Patient to be seen 3 x/week to address the above listed problems via self-care/home management, therapeutic activities, therapeutic exercises  · Plan of Care Expires: 18  · Plan of Care Reviewed with: patient    This Plan of care has been discussed with the patient who was involved in its development and understands and is in agreement with the identified goals and treatment plan    GOALS:    Occupational Therapy Goals        Problem: Occupational Therapy Goal    Goal Priority Disciplines Outcome Interventions   Occupational Therapy Goal     OT, PT/OT Ongoing (interventions implemented as appropriate)    Description:  Goals to be met by: 18     Patient will increase functional independence with ADLs by performing:    UE Dressing with [place].  LE Dressing with [place].  Grooming while standing at sink with Supervision.  Toileting from toilet with Supervision for hygiene and clothing management.   Toilet transfer to " toilet with Supervision.                      Time Tracking:     OT Date of Treatment: 08/07/18  OT Start Time: 1040  OT Stop Time: 1055  OT Total Time (min): 15 min    Billable Minutes:Evaluation 15    Cherry Ocampo OT  8/7/2018

## 2018-08-07 NOTE — ANESTHESIA POSTPROCEDURE EVALUATION
"Anesthesia Post Evaluation    Patient: Leni Henson    Procedure(s) Performed: Procedure(s) (LRB):  Lap sigmoid colectomy (N/A)    Final Anesthesia Type: general  Patient location during evaluation: floor  Patient participation: Yes- Able to Participate  Level of consciousness: awake and alert and oriented  Post-procedure vital signs: reviewed and stable  Pain management: adequate  Airway patency: patent  PONV status at discharge: No PONV  Anesthetic complications: no      Cardiovascular status: blood pressure returned to baseline and hemodynamically stable  Respiratory status: unassisted, spontaneous ventilation and room air  Hydration status: euvolemic  Follow-up not needed.        Visit Vitals  BP (!) 109/59 (BP Location: Right arm, Patient Position: Lying)   Pulse 60   Temp 36.7 °C (98.1 °F) (Oral)   Resp 18   Ht 5' 6" (1.676 m)   Wt 79.4 kg (175 lb)   SpO2 99%   Breastfeeding? No   BMI 28.25 kg/m²       Pain/Arron Score: Pain Assessment Performed: Yes (8/7/2018  4:00 PM)  Presence of Pain: denies (8/7/2018  4:00 PM)  Pain Rating Prior to Med Admin: 10 (8/7/2018  3:06 PM)  Pain Rating Post Med Admin: 0 (8/6/2018  6:00 AM)  Arron Score: 10 (8/6/2018 12:00 PM)      "

## 2018-08-07 NOTE — PROGRESS NOTES
[facility]  Colorectal Surgery  Progress Note    Patient Name: Leni Henson  MRN: [id number]  Admission Date: 8/6/2018  Hospital Length of Stay: 1 days  Attending Physician: Thomas Kim MD    Subjective:     Interval History: no acute events overnite, no n/v, adequate pain control with current plan, no flatus or bm      Post-Op Info:  Procedure(s) (LRB):  Lap sigmoid colectomy (N/A)   1 Day Post-Op      Medications:  Continuous Infusions:   sodium chloride 0.9% 50 mL/hr at 08/07/18 0930     Scheduled Meds:   cetirizine  10 mg Oral Daily    enoxaparin  40 mg Subcutaneous Daily    ibuprofen  800 mg Intravenous Q6H     PRN Meds:   fluticasone    HYDROmorphone    naloxone    ondansetron    oxyCODONE    oxyCODONE    promethazine (PHENERGAN) IVPB    sodium chloride 0.9%        Objective:     Vital Signs (Most Recent):  Temp: 98.3 °F (36.8 °C) (08/07/18 1154)  Pulse: (!) 58 (08/07/18 1154)  Resp: 18 (08/07/18 1154)  BP: (!) 104/58 (08/07/18 1154)  SpO2: 99 % (08/07/18 1154) Vital Signs (24h Range):  Temp:  [96.7 °F (35.9 °C)-98.3 °F (36.8 °C)] 98.3 °F (36.8 °C)  Pulse:  [56-61] 58  Resp:  [16-20] 18  SpO2:  [98 %-100 %] 99 %  BP: ()/(53-69) 104/58     Intake/Output - Last 3 Shifts       08/05 0700 - 08/06 0659 08/06 0700 - 08/07 0659 08/07 0700 - 08/08 0659    P.O.  210     I.V. (mL/kg)  1780 (22.4)     IV Piggyback  100     Total Intake(mL/kg)  2090 (26.3)     Urine (mL/kg/hr)  1950 (1)     Total Output   1950      Net   +140                   Physical Exam   Constitutional: She is oriented to person, place, and time. She appears well-developed and well-nourished.   HENT:   Head: Normocephalic.   Eyes: Pupils are equal, round, and reactive to light.   Cardiovascular: Normal rate, regular rhythm and normal heart sounds.    Pulmonary/Chest: Effort normal and breath sounds normal. No respiratory distress. She has no wheezes. She has no rales.   Abdominal: Soft. She exhibits no  mass. There is no rebound and no guarding.   abd soft, appropriate tenderness  Lower pannus inc line dsg dry and intact   Neurological: She is alert and oriented to person, place, and time.   Skin: Skin is warm and dry.   Psychiatric: She has a normal mood and affect. Her behavior is normal. Judgment and thought content normal.   Nursing note and vitals reviewed.          Significant Labs:  BMP (Last 3 Results):   Recent Labs  Lab 08/07/18  0424         K 3.5      CO2 23   BUN 5*   CREATININE 0.8   CALCIUM 8.2*   MG 1.9     CBC (Last 3 Results):   Recent Labs  Lab 08/07/18  0424   WBC 11.50   RBC 3.96*   HGB 8.4*   HCT 30.1*      MCV 76*   MCH 21.2*   MCHC 27.9*       Significant Diagnostics:  None    Assessment/Plan:     Cancer of sigmoid colon    1 Day Post-Op sigmoid colectomy, doing well    Diet as tolerated  DC green, void trial  Pain control as needed - liquid medications (doesn't swallow pills)  OOB/ambulate  IS use  DVT ppx - lovenox              Caroline Boston NP  Colorectal Surgery  [facility]

## 2018-08-08 LAB
ANION GAP SERPL CALC-SCNC: 7 MMOL/L
BASOPHILS # BLD AUTO: 0.02 K/UL
BASOPHILS NFR BLD: 0.2 %
BUN SERPL-MCNC: 5 MG/DL
CALCIUM SERPL-MCNC: 8.3 MG/DL
CHLORIDE SERPL-SCNC: 107 MMOL/L
CO2 SERPL-SCNC: 24 MMOL/L
CREAT SERPL-MCNC: 0.7 MG/DL
DIFFERENTIAL METHOD: ABNORMAL
EOSINOPHIL # BLD AUTO: 0.1 K/UL
EOSINOPHIL NFR BLD: 1.6 %
ERYTHROCYTE [DISTWIDTH] IN BLOOD BY AUTOMATED COUNT: 18.8 %
EST. GFR  (AFRICAN AMERICAN): >60 ML/MIN/1.73 M^2
EST. GFR  (NON AFRICAN AMERICAN): >60 ML/MIN/1.73 M^2
GLUCOSE SERPL-MCNC: 90 MG/DL
HCT VFR BLD AUTO: 28.3 %
HGB BLD-MCNC: 8.2 G/DL
IMM GRANULOCYTES # BLD AUTO: 0.02 K/UL
IMM GRANULOCYTES NFR BLD AUTO: 0.2 %
LYMPHOCYTES # BLD AUTO: 2.1 K/UL
LYMPHOCYTES NFR BLD: 25.4 %
MAGNESIUM SERPL-MCNC: 1.6 MG/DL
MCH RBC QN AUTO: 21.6 PG
MCHC RBC AUTO-ENTMCNC: 29 G/DL
MCV RBC AUTO: 75 FL
MONOCYTES # BLD AUTO: 0.8 K/UL
MONOCYTES NFR BLD: 9.4 %
NEUTROPHILS # BLD AUTO: 5.3 K/UL
NEUTROPHILS NFR BLD: 63.2 %
NRBC BLD-RTO: 0 /100 WBC
PHOSPHATE SERPL-MCNC: 2.5 MG/DL
PLATELET # BLD AUTO: 272 K/UL
PMV BLD AUTO: 9.6 FL
POTASSIUM SERPL-SCNC: 3.2 MMOL/L
RBC # BLD AUTO: 3.8 M/UL
SODIUM SERPL-SCNC: 138 MMOL/L
WBC # BLD AUTO: 8.31 K/UL

## 2018-08-08 PROCEDURE — 99900035 HC TECH TIME PER 15 MIN (STAT)

## 2018-08-08 PROCEDURE — 25000003 PHARM REV CODE 250: Performed by: COLON & RECTAL SURGERY

## 2018-08-08 PROCEDURE — 80048 BASIC METABOLIC PNL TOTAL CA: CPT

## 2018-08-08 PROCEDURE — 85025 COMPLETE CBC W/AUTO DIFF WBC: CPT

## 2018-08-08 PROCEDURE — 63600175 PHARM REV CODE 636 W HCPCS: Performed by: STUDENT IN AN ORGANIZED HEALTH CARE EDUCATION/TRAINING PROGRAM

## 2018-08-08 PROCEDURE — 84100 ASSAY OF PHOSPHORUS: CPT

## 2018-08-08 PROCEDURE — 36415 COLL VENOUS BLD VENIPUNCTURE: CPT

## 2018-08-08 PROCEDURE — 25000003 PHARM REV CODE 250: Performed by: STUDENT IN AN ORGANIZED HEALTH CARE EDUCATION/TRAINING PROGRAM

## 2018-08-08 PROCEDURE — 20600001 HC STEP DOWN PRIVATE ROOM

## 2018-08-08 PROCEDURE — 25000003 PHARM REV CODE 250: Performed by: NURSE PRACTITIONER

## 2018-08-08 PROCEDURE — 83735 ASSAY OF MAGNESIUM: CPT

## 2018-08-08 RX ORDER — LANOLIN ALCOHOL/MO/W.PET/CERES
400 CREAM (GRAM) TOPICAL ONCE
Status: COMPLETED | OUTPATIENT
Start: 2018-08-08 | End: 2018-08-08

## 2018-08-08 RX ORDER — SIMETHICONE 80 MG
1 TABLET,CHEWABLE ORAL 3 TIMES DAILY PRN
Status: DISCONTINUED | OUTPATIENT
Start: 2018-08-08 | End: 2018-08-11 | Stop reason: HOSPADM

## 2018-08-08 RX ORDER — POTASSIUM CHLORIDE 20 MEQ/1
40 TABLET, EXTENDED RELEASE ORAL ONCE
Status: COMPLETED | OUTPATIENT
Start: 2018-08-08 | End: 2018-08-08

## 2018-08-08 RX ORDER — DEXTROSE MONOHYDRATE, SODIUM CHLORIDE, AND POTASSIUM CHLORIDE 50; 1.49; 4.5 G/1000ML; G/1000ML; G/1000ML
INJECTION, SOLUTION INTRAVENOUS CONTINUOUS
Status: DISCONTINUED | OUTPATIENT
Start: 2018-08-08 | End: 2018-08-09

## 2018-08-08 RX ADMIN — DEXTROSE MONOHYDRATE, SODIUM CHLORIDE, AND POTASSIUM CHLORIDE: 50; 4.5; 1.49 INJECTION, SOLUTION INTRAVENOUS at 07:08

## 2018-08-08 RX ADMIN — ENOXAPARIN SODIUM 40 MG: 100 INJECTION SUBCUTANEOUS at 05:08

## 2018-08-08 RX ADMIN — MAGNESIUM OXIDE TAB 400 MG (241.3 MG ELEMENTAL MG) 400 MG: 400 (241.3 MG) TAB at 09:08

## 2018-08-08 RX ADMIN — HYDROMORPHONE HYDROCHLORIDE 0.5 MG: 1 INJECTION, SOLUTION INTRAMUSCULAR; INTRAVENOUS; SUBCUTANEOUS at 02:08

## 2018-08-08 RX ADMIN — DEXTROSE MONOHYDRATE, SODIUM CHLORIDE, AND POTASSIUM CHLORIDE: 50; 4.5; 1.49 INJECTION, SOLUTION INTRAVENOUS at 08:08

## 2018-08-08 RX ADMIN — SODIUM CHLORIDE: 0.9 INJECTION, SOLUTION INTRAVENOUS at 06:08

## 2018-08-08 RX ADMIN — OXYCODONE HYDROCHLORIDE 10 MG: 5 SOLUTION ORAL at 03:08

## 2018-08-08 RX ADMIN — OXYCODONE HYDROCHLORIDE 10 MG: 5 SOLUTION ORAL at 08:08

## 2018-08-08 RX ADMIN — OXYCODONE HYDROCHLORIDE 10 MG: 5 SOLUTION ORAL at 06:08

## 2018-08-08 RX ADMIN — CETIRIZINE HYDROCHLORIDE 10 MG: 10 TABLET, FILM COATED ORAL at 08:08

## 2018-08-08 RX ADMIN — SODIUM CHLORIDE 500 ML: 0.9 INJECTION, SOLUTION INTRAVENOUS at 05:08

## 2018-08-08 RX ADMIN — POTASSIUM CHLORIDE 40 MEQ: 1500 TABLET, EXTENDED RELEASE ORAL at 09:08

## 2018-08-08 RX ADMIN — HYDROMORPHONE HYDROCHLORIDE 0.5 MG: 1 INJECTION, SOLUTION INTRAMUSCULAR; INTRAVENOUS; SUBCUTANEOUS at 07:08

## 2018-08-08 RX ADMIN — OXYCODONE HYDROCHLORIDE 10 MG: 5 SOLUTION ORAL at 02:08

## 2018-08-08 RX ADMIN — IBUPROFEN 800 MG: 800 INJECTION INTRAVENOUS at 06:08

## 2018-08-08 NOTE — PLAN OF CARE
Problem: Patient Care Overview  Goal: Plan of Care Review  Outcome: Ongoing (interventions implemented as appropriate)  POC reviewed with pt. Who verbalized understanding. AAOx 4. Remains free of falls and injuries. VSS. Tolerating Low Fiber/Residue diet, denies nausea. Pain controlled with PRN meds. Independent activity. No acute events. No distress noted. WCTM.    0500 Pts BP dropped to 81/42, Notified Dr Mckeon and ordered 500cc Bolus.  0600 Rechecked BP (99/54) Paged On Call for Dr Kim, awaiting call back and passed on to day nurse. WCTM.

## 2018-08-08 NOTE — ASSESSMENT & PLAN NOTE
2 Days Post-Op sigmoid colectomy, doing well    Diet as tolerated  Pain control as needed - liquid medications (doesn't swallow pills)  OOB/ambulate  IS use  DVT ppx - lovenox

## 2018-08-08 NOTE — SUBJECTIVE & OBJECTIVE
Subjective:     Interval History: Hypotension overnight, responsive to fluids. Still awaiting bowel function. Denies n/v.      Post-Op Info:  Procedure(s) (LRB):  Lap sigmoid colectomy (N/A)   2 Days Post-Op      Medications:  Continuous Infusions:   dextrose 5 % and 0.45 % NaCl with KCl 20 mEq 100 mL/hr at 08/08/18 0927     Scheduled Meds:   cetirizine  10 mg Oral Daily    enoxaparin  40 mg Subcutaneous Daily     PRN Meds:   fluticasone    HYDROmorphone    naloxone    ondansetron    oxyCODONE    oxyCODONE    promethazine (PHENERGAN) IVPB    simethicone    sodium chloride 0.9%        Objective:     Vital Signs (Most Recent):  Temp: 98.2 °F (36.8 °C) (08/08/18 0754)  Pulse: 60 (08/08/18 0754)  Resp: 17 (08/08/18 0754)  BP: 105/63 (08/08/18 0754)  SpO2: 100 % (08/08/18 0754) Vital Signs (24h Range):  Temp:  [97.6 °F (36.4 °C)-98.8 °F (37.1 °C)] 98.2 °F (36.8 °C)  Pulse:  [58-68] 60  Resp:  [17-18] 17  SpO2:  [96 %-100 %] 100 %  BP: ()/(42-63) 105/63     Intake/Output - Last 3 Shifts       08/06 0700 - 08/07 0659 08/07 0700 - 08/08 0659 08/08 0700 - 08/09 0659    P.O. 210      I.V. (mL/kg) 1780 (22.4) 1543.3 (19.4) 400 (5)    IV Piggyback 100 750 750    Total Intake(mL/kg) 2090 (26.3) 2293.3 (28.9) 1150 (14.5)    Urine (mL/kg/hr) 1950 (1) 1450 (0.8) 400 (1.5)    Emesis/NG output  0 (0) 0 (0)    Stool  0 (0) 0 (0)    Total Output 1950 1450 400    Net +140 +843.3 +750           Urine Occurrence  4 x           Physical Exam   Constitutional: She is oriented to person, place, and time. She appears well-developed and well-nourished.   HENT:   Head: Normocephalic.   Eyes: Pupils are equal, round, and reactive to light.   Cardiovascular: Normal rate, regular rhythm and normal heart sounds.    Pulmonary/Chest: Effort normal and breath sounds normal. No respiratory distress. She has no wheezes. She has no rales.   Abdominal: Soft. She exhibits no mass. There is no rebound and no guarding.   abd soft,  appropriate tenderness  Lower pannus inc line dsg dry and intact   Neurological: She is alert and oriented to person, place, and time.   Skin: Skin is warm and dry.   Psychiatric: She has a normal mood and affect. Her behavior is normal. Judgment and thought content normal.   Nursing note and vitals reviewed.          Significant Labs:  BMP (Last 3 Results):     Recent Labs  Lab 08/07/18  0424 08/08/18  0500    90    138   K 3.5 3.2*    107   CO2 23 24   BUN 5* 5*   CREATININE 0.8 0.7   CALCIUM 8.2* 8.3*   MG 1.9 1.6     CBC (Last 3 Results):     Recent Labs  Lab 08/07/18  0424 08/08/18  0459   WBC 11.50 8.31   RBC 3.96* 3.80*   HGB 8.4* 8.2*   HCT 30.1* 28.3*    272   MCV 76* 75*   MCH 21.2* 21.6*   MCHC 27.9* 29.0*       Significant Diagnostics:  None

## 2018-08-08 NOTE — PHYSICIAN QUERY
PT Name: Leni Henson  MR #: [id number]     Physician Query Form - Documentation Clarification      CDS/: Kareen Kendall RN, CCDS               Contact information:  [email or web address]    This form is a permanent document in the medical record.     Query Date: August 8, 2018    By submitting this query, we are merely seeking further clarification of documentation. Please utilize your independent clinical judgment when addressing the question(s) below.    The Medical record reflects the following:    Supporting Clinical Findings Location in Medical Record   Cancer of sigmoid colon  48-year-old woman with mid sigmoid adenocarcinoma with no metastatic disease on her recent imaging.     Malignant neoplasm of splenic flexure    Date of procedure:  08/06/2018  Preoperative diagnosis:  Malignant neoplasm of the sigmoid colon.   Postoperative diagnosis:  Malignant neoplasm of the sigmoid colon.   Procedures performed:  Laparoscopic sigmoid colectomy with colorectal anastomosis and flexible sigmoidoscopy.    Stricturing malignant lesion in the mid sigmoid colon.    The specimen was in the mid sigmoid colon and we came   easily out through the Pfannenstiel incision.  H & P (view-only)        Interval H & P    Op note            Op note    Op note                                                                                      Doctor, Please specify diagnosis or diagnoses associated with above clinical findings.  Due to conflicting documentation of malignant neoplasm of splenic flexure and malignant neoplasm of the sigmoid colon, please clarify the location of malignant neoplasm.    Provider Use Only    [  x ] Malignant neoplasm of the sigmoid colon    [   ] Malignant neoplasm of splenic flexure    [   ] Other (specify) __________                                                                                                                   [  ] Clinically undetermined

## 2018-08-08 NOTE — PROGRESS NOTES
[facility]  Colorectal Surgery  Progress Note    Patient Name: Leni Henson  MRN: [id number]  Admission Date: 8/6/2018  Hospital Length of Stay: 2 days  Attending Physician: Thomas Kim MD    Subjective:     Interval History: Hypotension overnight, responsive to fluids. Still awaiting bowel function. Denies n/v.      Post-Op Info:  Procedure(s) (LRB):  Lap sigmoid colectomy (N/A)   2 Days Post-Op      Medications:  Continuous Infusions:   dextrose 5 % and 0.45 % NaCl with KCl 20 mEq 100 mL/hr at 08/08/18 0927     Scheduled Meds:   cetirizine  10 mg Oral Daily    enoxaparin  40 mg Subcutaneous Daily     PRN Meds:   fluticasone    HYDROmorphone    naloxone    ondansetron    oxyCODONE    oxyCODONE    promethazine (PHENERGAN) IVPB    simethicone    sodium chloride 0.9%        Objective:     Vital Signs (Most Recent):  Temp: 98.2 °F (36.8 °C) (08/08/18 0754)  Pulse: 60 (08/08/18 0754)  Resp: 17 (08/08/18 0754)  BP: 105/63 (08/08/18 0754)  SpO2: 100 % (08/08/18 0754) Vital Signs (24h Range):  Temp:  [97.6 °F (36.4 °C)-98.8 °F (37.1 °C)] 98.2 °F (36.8 °C)  Pulse:  [58-68] 60  Resp:  [17-18] 17  SpO2:  [96 %-100 %] 100 %  BP: ()/(42-63) 105/63     Intake/Output - Last 3 Shifts       08/06 0700 - 08/07 0659 08/07 0700 - 08/08 0659 08/08 0700 - 08/09 0659    P.O. 210      I.V. (mL/kg) 1780 (22.4) 1543.3 (19.4) 400 (5)    IV Piggyback 100 750 750    Total Intake(mL/kg) 2090 (26.3) 2293.3 (28.9) 1150 (14.5)    Urine (mL/kg/hr) 1950 (1) 1450 (0.8) 400 (1.5)    Emesis/NG output  0 (0) 0 (0)    Stool  0 (0) 0 (0)    Total Output 1950 1450 400    Net +140 +843.3 +750           Urine Occurrence  4 x           Physical Exam   Constitutional: She is oriented to person, place, and time. She appears well-developed and well-nourished.   HENT:   Head: Normocephalic.   Eyes: Pupils are equal, round, and reactive to light.   Cardiovascular: Normal rate, regular rhythm and normal heart sounds.     Pulmonary/Chest: Effort normal and breath sounds normal. No respiratory distress. She has no wheezes. She has no rales.   Abdominal: Soft. She exhibits no mass. There is no rebound and no guarding.   abd soft, appropriate tenderness  Lower pannus inc line dsg dry and intact   Neurological: She is alert and oriented to person, place, and time.   Skin: Skin is warm and dry.   Psychiatric: She has a normal mood and affect. Her behavior is normal. Judgment and thought content normal.   Nursing note and vitals reviewed.          Significant Labs:  BMP (Last 3 Results):     Recent Labs  Lab 08/07/18  0424 08/08/18  0500    90    138   K 3.5 3.2*    107   CO2 23 24   BUN 5* 5*   CREATININE 0.8 0.7   CALCIUM 8.2* 8.3*   MG 1.9 1.6     CBC (Last 3 Results):     Recent Labs  Lab 08/07/18  0424 08/08/18  0459   WBC 11.50 8.31   RBC 3.96* 3.80*   HGB 8.4* 8.2*   HCT 30.1* 28.3*    272   MCV 76* 75*   MCH 21.2* 21.6*   MCHC 27.9* 29.0*       Significant Diagnostics:  None    Assessment/Plan:     Cancer of sigmoid colon    2 Days Post-Op sigmoid colectomy, doing well    Diet as tolerated  Pain control as needed - liquid medications (doesn't swallow pills)  OOB/ambulate  IS use  DVT ppx - lovenox              Deb Rivera NP  Colorectal Surgery  [facility]

## 2018-08-08 NOTE — PHYSICIAN QUERY
"PT Name: Leni Henson  MR #: [id number]    Physician Query Form - Hematology Clarification      CDS/: Kareen Kendall RN, CCDS               Contact information:  [email or web address]    This form is a permanent document in the medical record.      Query Date: August 8, 2018    By submitting this query, we are merely seeking further clarification of documentation. Please utilize your independent clinical judgment when addressing the question(s) below.    The Medical record contains the following:   Indicators  Supporting Clinical Findings Location in Medical Record   X "Anemia" documented  -- Anemia: Current/Recent Cancer.  Anesthesia info   X H & H = H/H = 9/31    Lab 08/07/18  0424 08/08/18  0459   WBC 11.50 8.31   RBC 3.96* 3.80*   HGB 8.4* 8.2*   HCT 30.1* 28.3*      H & P (view-only)    Colon and Rectal surgery progress note 08/08 10:28 AM      BP =                     HR=      "GI bleeding" documented      Acute bleeding (Non GI site)      Transfusion(s)      Treatment:     X Other:  Colonoscopy done for heme + stool in the setting of anemia. (7/25/18)  Findings:  Large fungating mass in the sigmoid colon.  Not traversed with the scope.     This has been associated with a roughly 2-1/2 year history of intermittent blood in the stool.    Gastrointestinal: Positive for abdominal pain, blood in stool and constipation.     Cancer of sigmoid colon    Flexible sigmoidoscopy showed a near obstructing circumferential mass in the mid sigmoid colon consistent with malignancy at 28 cm from the anal verge.  This is in the sigmoid colon in the middle of the sigmoid colon.  Has stigmata of recent bleeding. No biopsies taken. H & P (view-only)        H & P (view-only)      H & P (view-only)      H & P (view-only)    Anesthesia info     Provider, please specify diagnosis or diagnoses associated with above clinical findings.    [  ] Acute blood loss anemia    [  ] Chronic blood loss anemia    [ x ] Anemia of chronic disease " ( Specify chronic disease)      [ x ] Neoplastic disease      [  ] Other (Specify) _______________________________     [  ] Clinically Undetermined     [  ] Other Hematological Diagnosis (please specify): _________________________________    [  ] Clinically Undetermined       Please document in your progress notes daily for the duration of treatment, until resolved, and include in your discharge summary.

## 2018-08-08 NOTE — PHYSICIAN QUERY
PT Name: Leni Henson  MR #: [id number]     Physician Query Form - Documentation Clarification      CDS/: Kareen Kendall RN, CCDS               Contact information: [email or web address]     This form is a permanent document in the medical record.     Query Date: August 8, 2018    By submitting this query, we are merely seeking further clarification of documentation. Please utilize your independent clinical judgment when addressing the question(s) below.    The Medical record reflects the following:    Supporting Clinical Findings Location in Medical Record   Potassium  3.5    3.2      Potassium chloride oral Lab  08/07 08/08    MAR                                                                                      Doctor, Please specify diagnosis or diagnoses associated with above clinical findings.    Provider Use Only      [  x ] Hypokalemia    [   ] Other (specify) ________________                                                                                                                     [  ] Clinically undetermined

## 2018-08-09 LAB
ANION GAP SERPL CALC-SCNC: 8 MMOL/L
BASOPHILS # BLD AUTO: 0.01 K/UL
BASOPHILS NFR BLD: 0.2 %
BUN SERPL-MCNC: 3 MG/DL
CALCIUM SERPL-MCNC: 8.2 MG/DL
CHLORIDE SERPL-SCNC: 103 MMOL/L
CO2 SERPL-SCNC: 25 MMOL/L
CREAT SERPL-MCNC: 0.6 MG/DL
CRP SERPL-MCNC: 70.3 MG/L
DIFFERENTIAL METHOD: ABNORMAL
EOSINOPHIL # BLD AUTO: 0.1 K/UL
EOSINOPHIL NFR BLD: 2 %
ERYTHROCYTE [DISTWIDTH] IN BLOOD BY AUTOMATED COUNT: 18.9 %
EST. GFR  (AFRICAN AMERICAN): >60 ML/MIN/1.73 M^2
EST. GFR  (NON AFRICAN AMERICAN): >60 ML/MIN/1.73 M^2
GLUCOSE SERPL-MCNC: 105 MG/DL
HCT VFR BLD AUTO: 26.6 %
HGB BLD-MCNC: 8 G/DL
IMM GRANULOCYTES # BLD AUTO: 0.01 K/UL
IMM GRANULOCYTES NFR BLD AUTO: 0.2 %
LYMPHOCYTES # BLD AUTO: 1.5 K/UL
LYMPHOCYTES NFR BLD: 22.5 %
MAGNESIUM SERPL-MCNC: 1.7 MG/DL
MCH RBC QN AUTO: 22.1 PG
MCHC RBC AUTO-ENTMCNC: 30.1 G/DL
MCV RBC AUTO: 74 FL
MONOCYTES # BLD AUTO: 0.7 K/UL
MONOCYTES NFR BLD: 10 %
NEUTROPHILS # BLD AUTO: 4.3 K/UL
NEUTROPHILS NFR BLD: 65.1 %
NRBC BLD-RTO: 0 /100 WBC
PHOSPHATE SERPL-MCNC: 2.5 MG/DL
PLATELET # BLD AUTO: 260 K/UL
PMV BLD AUTO: 9.6 FL
POTASSIUM SERPL-SCNC: 3.4 MMOL/L
RBC # BLD AUTO: 3.62 M/UL
SODIUM SERPL-SCNC: 136 MMOL/L
WBC # BLD AUTO: 6.61 K/UL

## 2018-08-09 PROCEDURE — 25000003 PHARM REV CODE 250: Performed by: NURSE PRACTITIONER

## 2018-08-09 PROCEDURE — 36415 COLL VENOUS BLD VENIPUNCTURE: CPT

## 2018-08-09 PROCEDURE — 80048 BASIC METABOLIC PNL TOTAL CA: CPT

## 2018-08-09 PROCEDURE — 20600001 HC STEP DOWN PRIVATE ROOM

## 2018-08-09 PROCEDURE — 25000003 PHARM REV CODE 250: Performed by: STUDENT IN AN ORGANIZED HEALTH CARE EDUCATION/TRAINING PROGRAM

## 2018-08-09 PROCEDURE — 83735 ASSAY OF MAGNESIUM: CPT

## 2018-08-09 PROCEDURE — 25000003 PHARM REV CODE 250: Performed by: COLON & RECTAL SURGERY

## 2018-08-09 PROCEDURE — 85025 COMPLETE CBC W/AUTO DIFF WBC: CPT

## 2018-08-09 PROCEDURE — 63600175 PHARM REV CODE 636 W HCPCS: Performed by: STUDENT IN AN ORGANIZED HEALTH CARE EDUCATION/TRAINING PROGRAM

## 2018-08-09 PROCEDURE — 86140 C-REACTIVE PROTEIN: CPT

## 2018-08-09 PROCEDURE — 84100 ASSAY OF PHOSPHORUS: CPT

## 2018-08-09 RX ORDER — POTASSIUM CHLORIDE 20 MEQ/15ML
40 SOLUTION ORAL ONCE
Status: COMPLETED | OUTPATIENT
Start: 2018-08-09 | End: 2018-08-09

## 2018-08-09 RX ORDER — ADHESIVE BANDAGE
30 BANDAGE TOPICAL ONCE
Status: COMPLETED | OUTPATIENT
Start: 2018-08-09 | End: 2018-08-09

## 2018-08-09 RX ADMIN — OXYCODONE HYDROCHLORIDE 10 MG: 5 SOLUTION ORAL at 09:08

## 2018-08-09 RX ADMIN — OXYCODONE HYDROCHLORIDE 10 MG: 5 SOLUTION ORAL at 04:08

## 2018-08-09 RX ADMIN — CETIRIZINE HYDROCHLORIDE 10 MG: 10 TABLET, FILM COATED ORAL at 09:08

## 2018-08-09 RX ADMIN — ENOXAPARIN SODIUM 40 MG: 100 INJECTION SUBCUTANEOUS at 04:08

## 2018-08-09 RX ADMIN — POTASSIUM CHLORIDE 40 MEQ: 20 SOLUTION ORAL at 09:08

## 2018-08-09 RX ADMIN — HYDROMORPHONE HYDROCHLORIDE 0.5 MG: 1 INJECTION, SOLUTION INTRAMUSCULAR; INTRAVENOUS; SUBCUTANEOUS at 09:08

## 2018-08-09 RX ADMIN — OXYCODONE HYDROCHLORIDE 10 MG: 5 SOLUTION ORAL at 01:08

## 2018-08-09 RX ADMIN — MAGNESIUM HYDROXIDE 2400 MG: 400 SUSPENSION ORAL at 06:08

## 2018-08-09 NOTE — PROGRESS NOTES
[facility]  Colorectal Surgery  Progress Note    Patient Name: Leni Henson  MRN: [id number]  Admission Date: 8/6/2018  Hospital Length of Stay: 3 days  Attending Physician: Thomas Kim MD    Subjective:     Interval History: Passed flatus overnight. Not much appetite.       Post-Op Info:  Procedure(s) (LRB):  Lap sigmoid colectomy (N/A)   3 Days Post-Op      Medications:  Continuous Infusions:    Scheduled Meds:   cetirizine  10 mg Oral Daily    enoxaparin  40 mg Subcutaneous Daily     PRN Meds:   fluticasone    HYDROmorphone    naloxone    ondansetron    oxyCODONE    oxyCODONE    promethazine (PHENERGAN) IVPB    simethicone    sodium chloride 0.9%        Objective:     Vital Signs (Most Recent):  Temp: 98.6 °F (37 °C) (08/09/18 0818)  Pulse: 82 (08/09/18 0818)  Resp: 17 (08/09/18 0818)  BP: 115/67 (08/09/18 0818)  SpO2: 95 % (08/09/18 0818) Vital Signs (24h Range):  Temp:  [98.2 °F (36.8 °C)-98.7 °F (37.1 °C)] 98.6 °F (37 °C)  Pulse:  [65-82] 82  Resp:  [16-20] 17  SpO2:  [95 %-100 %] 95 %  BP: ()/(60-74) 115/67     Intake/Output - Last 3 Shifts       08/07 0700 - 08/08 0659 08/08 0700 - 08/09 0659 08/09 0700 - 08/10 0659    P.O.  1200 480    I.V. (mL/kg) 1543.3 (19.4) 1675.8 (21.1)     IV Piggyback 750 750     Total Intake(mL/kg) 2293.3 (28.9) 3625.8 (45.7) 480 (6)    Urine (mL/kg/hr) 1450 (0.8) 1600 (0.8) 700 (2.2)    Emesis/NG output 0 (0) 0 (0)     Stool 0 (0) 0 (0)     Total Output 1450 1600 700    Net +843.3 +2025.8 -220           Urine Occurrence 4 x 3 x           Physical Exam   Constitutional: She is oriented to person, place, and time. She appears well-developed and well-nourished.   HENT:   Head: Normocephalic.   Eyes: Pupils are equal, round, and reactive to light.   Cardiovascular: Normal rate, regular rhythm and normal heart sounds.    Pulmonary/Chest: Effort normal and breath sounds normal. No respiratory distress. She has no wheezes. She has no rales.    Abdominal: Soft. She exhibits no mass. There is no rebound and no guarding.   abd soft, appropriate tenderness  Lower pannus inc line dsg dry and intact   Neurological: She is alert and oriented to person, place, and time.   Skin: Skin is warm and dry.   Psychiatric: She has a normal mood and affect. Her behavior is normal. Judgment and thought content normal.   Nursing note and vitals reviewed.          Significant Labs:  BMP (Last 3 Results):     Recent Labs  Lab 08/07/18  0424 08/08/18  0500 08/09/18  0444    90 105    138 136   K 3.5 3.2* 3.4*    107 103   CO2 23 24 25   BUN 5* 5* 3*   CREATININE 0.8 0.7 0.6   CALCIUM 8.2* 8.3* 8.2*   MG 1.9 1.6 1.7     CBC (Last 3 Results):     Recent Labs  Lab 08/07/18  0424 08/08/18  0459 08/09/18  0444   WBC 11.50 8.31 6.61   RBC 3.96* 3.80* 3.62*   HGB 8.4* 8.2* 8.0*   HCT 30.1* 28.3* 26.6*    272 260   MCV 76* 75* 74*   MCH 21.2* 21.6* 22.1*   MCHC 27.9* 29.0* 30.1*       Significant Diagnostics:  None    Assessment/Plan:     * Cancer of sigmoid colon    3 Days Post-Op sigmoid colectomy, doing well    Diet as tolerated, add boost  Pain control as needed - liquid medications (doesn't swallow pills)  OOB/ambulate  IS use  DVT ppx - lovenox  Okay to shower  Dispo: potential d/c tomorrow              Deb Rivera NP  Colorectal Surgery  [facility]

## 2018-08-09 NOTE — PLAN OF CARE
Problem: Patient Care Overview  Goal: Plan of Care Review  Outcome: Ongoing (interventions implemented as appropriate)  Pt A & O x 4, adequate urine output via commode, pain semi-controlled with prn pain meds.  Pt took a shower and walked in brand.  Vital signs stable on room air.

## 2018-08-09 NOTE — SUBJECTIVE & OBJECTIVE
Subjective:     Interval History: Passed flatus overnight. Not much appetite.       Post-Op Info:  Procedure(s) (LRB):  Lap sigmoid colectomy (N/A)   3 Days Post-Op      Medications:  Continuous Infusions:    Scheduled Meds:   cetirizine  10 mg Oral Daily    enoxaparin  40 mg Subcutaneous Daily     PRN Meds:   fluticasone    HYDROmorphone    naloxone    ondansetron    oxyCODONE    oxyCODONE    promethazine (PHENERGAN) IVPB    simethicone    sodium chloride 0.9%        Objective:     Vital Signs (Most Recent):  Temp: 98.6 °F (37 °C) (08/09/18 0818)  Pulse: 82 (08/09/18 0818)  Resp: 17 (08/09/18 0818)  BP: 115/67 (08/09/18 0818)  SpO2: 95 % (08/09/18 0818) Vital Signs (24h Range):  Temp:  [98.2 °F (36.8 °C)-98.7 °F (37.1 °C)] 98.6 °F (37 °C)  Pulse:  [65-82] 82  Resp:  [16-20] 17  SpO2:  [95 %-100 %] 95 %  BP: ()/(60-74) 115/67     Intake/Output - Last 3 Shifts       08/07 0700 - 08/08 0659 08/08 0700 - 08/09 0659 08/09 0700 - 08/10 0659    P.O.  1200 480    I.V. (mL/kg) 1543.3 (19.4) 1675.8 (21.1)     IV Piggyback 750 750     Total Intake(mL/kg) 2293.3 (28.9) 3625.8 (45.7) 480 (6)    Urine (mL/kg/hr) 1450 (0.8) 1600 (0.8) 700 (2.2)    Emesis/NG output 0 (0) 0 (0)     Stool 0 (0) 0 (0)     Total Output 1450 1600 700    Net +843.3 +2025.8 -220           Urine Occurrence 4 x 3 x           Physical Exam   Constitutional: She is oriented to person, place, and time. She appears well-developed and well-nourished.   HENT:   Head: Normocephalic.   Eyes: Pupils are equal, round, and reactive to light.   Cardiovascular: Normal rate, regular rhythm and normal heart sounds.    Pulmonary/Chest: Effort normal and breath sounds normal. No respiratory distress. She has no wheezes. She has no rales.   Abdominal: Soft. She exhibits no mass. There is no rebound and no guarding.   abd soft, appropriate tenderness  Lower pannus inc line dsg dry and intact   Neurological: She is alert and oriented to person, place, and  time.   Skin: Skin is warm and dry.   Psychiatric: She has a normal mood and affect. Her behavior is normal. Judgment and thought content normal.   Nursing note and vitals reviewed.          Significant Labs:  BMP (Last 3 Results):     Recent Labs  Lab 08/07/18  0424 08/08/18  0500 08/09/18  0444    90 105    138 136   K 3.5 3.2* 3.4*    107 103   CO2 23 24 25   BUN 5* 5* 3*   CREATININE 0.8 0.7 0.6   CALCIUM 8.2* 8.3* 8.2*   MG 1.9 1.6 1.7     CBC (Last 3 Results):     Recent Labs  Lab 08/07/18 0424 08/08/18  0459 08/09/18  0444   WBC 11.50 8.31 6.61   RBC 3.96* 3.80* 3.62*   HGB 8.4* 8.2* 8.0*   HCT 30.1* 28.3* 26.6*    272 260   MCV 76* 75* 74*   MCH 21.2* 21.6* 22.1*   MCHC 27.9* 29.0* 30.1*       Significant Diagnostics:  None

## 2018-08-09 NOTE — PLAN OF CARE
SW following for d/c needs.  Pt in need of HH services.  SW spoke with pt about HH choices.  Pt requested that SW send a referral to [facility].  SW sent a referral to [facility] via [facility].             Anthony Cornejo, MSW, [facility]  [facility]  [id number]

## 2018-08-09 NOTE — PLAN OF CARE
[facility]    HOME HEALTH ORDERS  FACE TO FACE ENCOUNTER    Patient Name: Leni Henson  YOB: 1970    PCP: Azikiwe K Lombard, MD   PCP Address: [address] / [place] [address]  PCP Phone Number: [phone]  PCP Fax: [phone]    Encounter Date: 08/09/2018    Admit to Home Health    Diagnoses:  Active Hospital Problems    Diagnosis  POA    *Cancer of sigmoid colon [C18.7]  Yes      Resolved Hospital Problems    Diagnosis Date Resolved POA   No resolved problems to display.       No future appointments.        I have seen and examined this patient face to face today. My clinical findings that support the need for the home health skilled services and home bound status are the following:  Weakness/numbness causing balance and gait disturbance due to Surgery making it taxing to leave home.    Allergies:Review of patient's allergies indicates:  No Known Allergies    Diet: regular diet    Activities: activity as tolerated and no heavy lifting for 6 weeks    Nursing:   SN to complete comprehensive assessment including routine vital signs. Instruct on disease process and s/s of complications to report to MD. Review/verify medication list sent home with the patient at time of discharge  and instruct patient/caregiver as needed. Frequency may be adjusted depending on start of care date.    Notify MD if SBP > 160 or < 90; DBP > 90 or < 50; HR > 120 or < 50; Temp > 101; Other:             MISCELLANEOUS CARE:  N/A    WOUND CARE ORDERS  n/a      Medications: Review discharge medications with patient and family and provide education.      Current Discharge Medication List      CONTINUE these medications which have NOT CHANGED    Details   cetirizine (ZYRTEC) 10 MG tablet Take 1 tablet (10 mg total) by mouth once daily.  Qty: 30 tablet, Refills: 0      fluticasone (FLONASE) 50 mcg/actuation nasal spray 1 spray (50 mcg total) by Each Nare route 2 (two) times daily as needed.  Qty: 15 g,  Refills: 2    Associated Diagnoses: Non-seasonal allergic rhinitis, unspecified trigger      naproxen (NAPROSYN) 500 MG tablet Take 1 tablet (500 mg total) by mouth 2 (two) times daily with meals.  Qty: 14 tablet, Refills: 0             I certify that this patient is confined to her home and needs intermittent skilled nursing care.

## 2018-08-09 NOTE — ASSESSMENT & PLAN NOTE
3 Days Post-Op sigmoid colectomy, doing well    Diet as tolerated, add boost  Pain control as needed - liquid medications (doesn't swallow pills)  OOB/ambulate  IS use  DVT ppx - lovenox  Okay to shower  Dispo: potential d/c tomorrow

## 2018-08-09 NOTE — PLAN OF CARE
Problem: Patient Care Overview  Goal: Plan of Care Review  Outcome: Ongoing (interventions implemented as appropriate)  POC reviewed with patient, stated understanding, AAOx4, VSS, transverse ABD incsion WDL, telfa dsg in place, x3 lap sites WDL.  IVF infusing per order. Pt pain effectively managed with meds PO and IV. IND in RM, amb to BR. No BM this shift. Bed low, call light in reach, will cont to manage POC.

## 2018-08-10 LAB
ANION GAP SERPL CALC-SCNC: 8 MMOL/L
BASOPHILS # BLD AUTO: 0.02 K/UL
BASOPHILS NFR BLD: 0.3 %
BUN SERPL-MCNC: 5 MG/DL
CALCIUM SERPL-MCNC: 8.7 MG/DL
CHLORIDE SERPL-SCNC: 101 MMOL/L
CO2 SERPL-SCNC: 26 MMOL/L
CREAT SERPL-MCNC: 0.7 MG/DL
CRP SERPL-MCNC: 73.3 MG/L
DIFFERENTIAL METHOD: ABNORMAL
EOSINOPHIL # BLD AUTO: 0.2 K/UL
EOSINOPHIL NFR BLD: 2.4 %
ERYTHROCYTE [DISTWIDTH] IN BLOOD BY AUTOMATED COUNT: 18.8 %
EST. GFR  (AFRICAN AMERICAN): >60 ML/MIN/1.73 M^2
EST. GFR  (NON AFRICAN AMERICAN): >60 ML/MIN/1.73 M^2
GLUCOSE SERPL-MCNC: 107 MG/DL
HCT VFR BLD AUTO: 29.1 %
HGB BLD-MCNC: 8.7 G/DL
IMM GRANULOCYTES # BLD AUTO: 0.01 K/UL
IMM GRANULOCYTES NFR BLD AUTO: 0.1 %
LYMPHOCYTES # BLD AUTO: 1.3 K/UL
LYMPHOCYTES NFR BLD: 18.7 %
MAGNESIUM SERPL-MCNC: 1.8 MG/DL
MCH RBC QN AUTO: 21.9 PG
MCHC RBC AUTO-ENTMCNC: 29.9 G/DL
MCV RBC AUTO: 73 FL
MONOCYTES # BLD AUTO: 0.7 K/UL
MONOCYTES NFR BLD: 10.6 %
NEUTROPHILS # BLD AUTO: 4.6 K/UL
NEUTROPHILS NFR BLD: 67.9 %
NRBC BLD-RTO: 0 /100 WBC
PHOSPHATE SERPL-MCNC: 3.3 MG/DL
PLATELET # BLD AUTO: 292 K/UL
PMV BLD AUTO: 9.5 FL
POTASSIUM SERPL-SCNC: 3.7 MMOL/L
RBC # BLD AUTO: 3.97 M/UL
SODIUM SERPL-SCNC: 135 MMOL/L
WBC # BLD AUTO: 6.7 K/UL

## 2018-08-10 PROCEDURE — G8988 SELF CARE GOAL STATUS: HCPCS | Mod: CI

## 2018-08-10 PROCEDURE — 85025 COMPLETE CBC W/AUTO DIFF WBC: CPT

## 2018-08-10 PROCEDURE — 80048 BASIC METABOLIC PNL TOTAL CA: CPT

## 2018-08-10 PROCEDURE — 36415 COLL VENOUS BLD VENIPUNCTURE: CPT

## 2018-08-10 PROCEDURE — 20600001 HC STEP DOWN PRIVATE ROOM

## 2018-08-10 PROCEDURE — 97530 THERAPEUTIC ACTIVITIES: CPT

## 2018-08-10 PROCEDURE — 25000003 PHARM REV CODE 250: Performed by: COLON & RECTAL SURGERY

## 2018-08-10 PROCEDURE — 25000003 PHARM REV CODE 250: Performed by: NURSE PRACTITIONER

## 2018-08-10 PROCEDURE — 86140 C-REACTIVE PROTEIN: CPT

## 2018-08-10 PROCEDURE — G8987 SELF CARE CURRENT STATUS: HCPCS | Mod: CK

## 2018-08-10 PROCEDURE — 84100 ASSAY OF PHOSPHORUS: CPT

## 2018-08-10 PROCEDURE — 63600175 PHARM REV CODE 636 W HCPCS: Performed by: STUDENT IN AN ORGANIZED HEALTH CARE EDUCATION/TRAINING PROGRAM

## 2018-08-10 PROCEDURE — 25000003 PHARM REV CODE 250: Performed by: STUDENT IN AN ORGANIZED HEALTH CARE EDUCATION/TRAINING PROGRAM

## 2018-08-10 PROCEDURE — 83735 ASSAY OF MAGNESIUM: CPT

## 2018-08-10 RX ORDER — BISACODYL 10 MG
10 SUPPOSITORY, RECTAL RECTAL DAILY
Status: DISCONTINUED | OUTPATIENT
Start: 2018-08-10 | End: 2018-08-11 | Stop reason: HOSPADM

## 2018-08-10 RX ORDER — DEXTROSE MONOHYDRATE, SODIUM CHLORIDE, AND POTASSIUM CHLORIDE 50; 1.49; 4.5 G/1000ML; G/1000ML; G/1000ML
INJECTION, SOLUTION INTRAVENOUS CONTINUOUS
Status: DISCONTINUED | OUTPATIENT
Start: 2018-08-10 | End: 2018-08-11 | Stop reason: HOSPADM

## 2018-08-10 RX ADMIN — HYDROMORPHONE HYDROCHLORIDE 0.5 MG: 1 INJECTION, SOLUTION INTRAMUSCULAR; INTRAVENOUS; SUBCUTANEOUS at 10:08

## 2018-08-10 RX ADMIN — OXYCODONE HYDROCHLORIDE 10 MG: 5 SOLUTION ORAL at 12:08

## 2018-08-10 RX ADMIN — CETIRIZINE HYDROCHLORIDE 10 MG: 10 TABLET, FILM COATED ORAL at 08:08

## 2018-08-10 RX ADMIN — HYDROMORPHONE HYDROCHLORIDE 0.5 MG: 1 INJECTION, SOLUTION INTRAMUSCULAR; INTRAVENOUS; SUBCUTANEOUS at 07:08

## 2018-08-10 RX ADMIN — HYDROMORPHONE HYDROCHLORIDE 0.5 MG: 1 INJECTION, SOLUTION INTRAMUSCULAR; INTRAVENOUS; SUBCUTANEOUS at 03:08

## 2018-08-10 RX ADMIN — BISACODYL 10 MG: 10 SUPPOSITORY RECTAL at 09:08

## 2018-08-10 RX ADMIN — DEXTROSE MONOHYDRATE, SODIUM CHLORIDE, AND POTASSIUM CHLORIDE: 50; 4.5; 1.49 INJECTION, SOLUTION INTRAVENOUS at 07:08

## 2018-08-10 RX ADMIN — OXYCODONE HYDROCHLORIDE 10 MG: 5 SOLUTION ORAL at 05:08

## 2018-08-10 RX ADMIN — ENOXAPARIN SODIUM 40 MG: 100 INJECTION SUBCUTANEOUS at 05:08

## 2018-08-10 NOTE — PLAN OF CARE
Update 4:23 PM  SW spoke with pt about HH services.  Pt ok with not receiving thiis services.  DEBBIE spkoe with pt and pt fiance about accessing  services and assistance through the cancer center.  Pt and pt fiance will look into assistance.      4:06pm  DEBBIE spokw with Kassy MAURO on 8/9/2018 about HH services. DEBBIE informed by  Kassy with [facility] that pt declined due to insurance benefits.  Pt insurance only covers wellness visits and not post acute services.        Anthony Cornejo, MSW, [facility]  [facility]  [id number]

## 2018-08-10 NOTE — SUBJECTIVE & OBJECTIVE
Subjective:     Interval History: still no BM, not much appetite. Walking in room during rounds     Post-Op Info:  Procedure(s) (LRB):  Lap sigmoid colectomy (N/A)   4 Days Post-Op      Medications:  Continuous Infusions:   dextrose 5 % and 0.45 % NaCl with KCl 20 mEq 50 mL/hr at 08/10/18 0746     Scheduled Meds:   bisacodyl  10 mg Rectal Daily    cetirizine  10 mg Oral Daily    enoxaparin  40 mg Subcutaneous Daily     PRN Meds:   fluticasone    HYDROmorphone    naloxone    ondansetron    oxyCODONE    oxyCODONE    promethazine (PHENERGAN) IVPB    simethicone    sodium chloride 0.9%        Objective:     Vital Signs (Most Recent):  Temp: 98.3 °F (36.8 °C) (08/10/18 0745)  Pulse: 77 (08/10/18 0745)  Resp: 16 (08/10/18 0745)  BP: 109/61 (08/10/18 0745)  SpO2: 100 % (08/10/18 0745) Vital Signs (24h Range):  Temp:  [97.4 °F (36.3 °C)-99.9 °F (37.7 °C)] 98.3 °F (36.8 °C)  Pulse:  [70-87] 77  Resp:  [16-18] 16  SpO2:  [96 %-100 %] 100 %  BP: (107-114)/(61-71) 109/61     Intake/Output - Last 3 Shifts       08/08 0700 - 08/09 0659 08/09 0700 - 08/10 0659 08/10 0700 - 08/11 0659    P.O. 1200 730     I.V. (mL/kg) 1675.8 (21.1)      IV Piggyback 750      Total Intake(mL/kg) 3625.8 (45.7) 730 (9.2)     Urine (mL/kg/hr) 1600 (0.8) 700 (0.4)     Emesis/NG output 0 (0)      Stool 0 (0)      Total Output 1600 700      Net +2025.8 +30             Urine Occurrence 3 x 1 x           Physical Exam   Constitutional: She is oriented to person, place, and time. She appears well-developed and well-nourished.   HENT:   Head: Normocephalic.   Eyes: Pupils are equal, round, and reactive to light.   Cardiovascular: Normal rate, regular rhythm and normal heart sounds.    Pulmonary/Chest: Effort normal and breath sounds normal. No respiratory distress. She has no wheezes. She has no rales.   Abdominal: Soft. She exhibits no mass. There is no rebound and no guarding.   abd soft, appropriate tenderness  Lower pannus inc line dsg dry  and intact   Neurological: She is alert and oriented to person, place, and time.   Skin: Skin is warm and dry.   Psychiatric: She has a normal mood and affect. Her behavior is normal. Judgment and thought content normal.   Nursing note and vitals reviewed.          Significant Labs:  BMP (Last 3 Results):     Recent Labs  Lab 08/08/18  0500 08/09/18  0444 08/10/18  0438   GLU 90 105 107    136 135*   K 3.2* 3.4* 3.7    103 101   CO2 24 25 26   BUN 5* 3* 5*   CREATININE 0.7 0.6 0.7   CALCIUM 8.3* 8.2* 8.7   MG 1.6 1.7 1.8     CBC (Last 3 Results):     Recent Labs  Lab 08/08/18  0459 08/09/18 0444 08/10/18  0438   WBC 8.31 6.61 6.70   RBC 3.80* 3.62* 3.97*   HGB 8.2* 8.0* 8.7*   HCT 28.3* 26.6* 29.1*    260 292   MCV 75* 74* 73*   MCH 21.6* 22.1* 21.9*   MCHC 29.0* 30.1* 29.9*       Significant Diagnostics:  None

## 2018-08-10 NOTE — PLAN OF CARE
Problem: Patient Care Overview  Goal: Plan of Care Review  Outcome: Ongoing (interventions implemented as appropriate)  POC reviewed with patient, stated understanding, AAOx4, VSS, transverse ABD incision DINESH, dermabond, no redness or swelling, x3 lap sites DINESH, WDL.  Pt pain effectively managed with meds IVP. Ind in room, amb to BR. No BM this shift. Bed low, call light in reach, no signs of distress, will cont to monitor.

## 2018-08-10 NOTE — PT/OT/SLP PROGRESS
"Occupational Therapy   Treatment    Name: Leni Henson  MRN: [id number]  Admitting Diagnosis:  Cancer of sigmoid colon  4 Days Post-Op    Recommendations:     Discharge Recommendations: home health OT  Discharge Equipment Recommendations:  walker, rolling  Barriers to discharge:  Decreased caregiver support    Subjective     Communicated with: RN prior to session.  Pain/Comfort:  · Pain Rating 1:  ("It's not good")  · Location - Side 1: Bilateral  · Location - Orientation 1: generalized  · Location 1: abdomen  · Pain Addressed 1: Reposition, Distraction, Cessation of Activity  · Pain Rating Post-Intervention 1:  (Pt did not rate)    Patients cultural, spiritual, Orthodox conflicts given the current situation: none reported    Objective:     Patient found with: peripheral IV    General Precautions: Standard, fall   Orthopedic Precautions:N/A   Braces: N/A     Occupational Performance:    Bed Mobility:    · Patient completed Supine to Sit with stand by assistance, with side rail and elevated HOB     Functional Mobility/Transfers:  · Patient completed Sit <> Stand Transfer with stand by assistance  with  rolling walker   · Patient completed Bed <> Chair Transfer using Step Transfer technique with stand by assistance with rolling walker  · Functional Mobility: Pt ambulate 150 ft with SBA using RW; pt took frequent standing rest breaks    Activities of Daily Living:  · Upper Body Dressing: setup assistance to don robe while seated EOB  · Lower Body Dressing: independence to don B slippers using figure-4 position while seated EOB    Patient left up in chair with all lines intact, call button in reach and friend present    [facility] 6 Click:  [facility] Total Score: 19    Treatment & Education:  Pt educated on role of OT/POC  Pt educated on importance of ambulation/UIC  Pt educated on new HHOT rec and rec of RW for home use  White board/communication board updated  Education:    Assessment:     Leni Henson is a 48 y.o. " female with a medical diagnosis of Cancer of sigmoid colon.  She presents with pain limiting independent functional mobility and self care.  Performance deficits affecting function are weakness, impaired endurance, impaired functional mobilty, impaired self care skills, pain.      Rehab Prognosis:  Good; patient would benefit from acute skilled OT services to address these deficits and reach maximum level of function.       Plan:     Patient to be seen 3 x/week to address the above listed problems via self-care/home management, therapeutic activities, therapeutic exercises  · Plan of Care Expires: 09/06/18  · Plan of Care Reviewed with: patient    This Plan of care has been discussed with the patient who was involved in its development and understands and is in agreement with the identified goals and treatment plan    GOALS:    Occupational Therapy Goals        Problem: Occupational Therapy Goal    Goal Priority Disciplines Outcome Interventions   Occupational Therapy Goal     OT, PT/OT Ongoing (interventions implemented as appropriate)    Description:  Goals to be met by: 8/14/18     Patient will increase functional independence with ADLs by performing:    UE Dressing with [place].  LE Dressing with [place]. GOAL MET 8/10  Grooming while standing at sink with Supervision.  Toileting from toilet with Supervision for hygiene and clothing management.   Toilet transfer to toilet with Supervision.                       Time Tracking:     OT Date of Treatment: 08/10/18  OT Start Time: 1128  OT Stop Time: 1151  OT Total Time (min): 23 min    Billable Minutes:Therapeutic Activity 23    Cherry Ocampo OT  8/10/2018

## 2018-08-10 NOTE — PROGRESS NOTES
[facility]  Colorectal Surgery  Progress Note    Patient Name: Leni Henson  MRN: [id number]  Admission Date: 8/6/2018  Hospital Length of Stay: 4 days  Attending Physician: Thomas Kim MD    Subjective:     Interval History: still no BM, not much appetite. Walking in room during rounds     Post-Op Info:  Procedure(s) (LRB):  Lap sigmoid colectomy (N/A)   4 Days Post-Op      Medications:  Continuous Infusions:   dextrose 5 % and 0.45 % NaCl with KCl 20 mEq 50 mL/hr at 08/10/18 0746     Scheduled Meds:   bisacodyl  10 mg Rectal Daily    cetirizine  10 mg Oral Daily    enoxaparin  40 mg Subcutaneous Daily     PRN Meds:   fluticasone    HYDROmorphone    naloxone    ondansetron    oxyCODONE    oxyCODONE    promethazine (PHENERGAN) IVPB    simethicone    sodium chloride 0.9%        Objective:     Vital Signs (Most Recent):  Temp: 98.3 °F (36.8 °C) (08/10/18 0745)  Pulse: 77 (08/10/18 0745)  Resp: 16 (08/10/18 0745)  BP: 109/61 (08/10/18 0745)  SpO2: 100 % (08/10/18 0745) Vital Signs (24h Range):  Temp:  [97.4 °F (36.3 °C)-99.9 °F (37.7 °C)] 98.3 °F (36.8 °C)  Pulse:  [70-87] 77  Resp:  [16-18] 16  SpO2:  [96 %-100 %] 100 %  BP: (107-114)/(61-71) 109/61     Intake/Output - Last 3 Shifts       08/08 0700 - 08/09 0659 08/09 0700 - 08/10 0659 08/10 0700 - 08/11 0659    P.O. 1200 730     I.V. (mL/kg) 1675.8 (21.1)      IV Piggyback 750      Total Intake(mL/kg) 3625.8 (45.7) 730 (9.2)     Urine (mL/kg/hr) 1600 (0.8) 700 (0.4)     Emesis/NG output 0 (0)      Stool 0 (0)      Total Output 1600 700      Net +2025.8 +30             Urine Occurrence 3 x 1 x           Physical Exam   Constitutional: She is oriented to person, place, and time. She appears well-developed and well-nourished.   HENT:   Head: Normocephalic.   Eyes: Pupils are equal, round, and reactive to light.   Cardiovascular: Normal rate, regular rhythm and normal heart sounds.    Pulmonary/Chest: Effort normal and breath  sounds normal. No respiratory distress. She has no wheezes. She has no rales.   Abdominal: Soft. She exhibits no mass. There is no rebound and no guarding.   abd soft, appropriate tenderness  Lower pannus inc line dsg dry and intact   Neurological: She is alert and oriented to person, place, and time.   Skin: Skin is warm and dry.   Psychiatric: She has a normal mood and affect. Her behavior is normal. Judgment and thought content normal.   Nursing note and vitals reviewed.          Significant Labs:  BMP (Last 3 Results):     Recent Labs  Lab 08/08/18  0500 08/09/18  0444 08/10/18  0438   GLU 90 105 107    136 135*   K 3.2* 3.4* 3.7    103 101   CO2 24 25 26   BUN 5* 3* 5*   CREATININE 0.7 0.6 0.7   CALCIUM 8.3* 8.2* 8.7   MG 1.6 1.7 1.8     CBC (Last 3 Results):     Recent Labs  Lab 08/08/18  0459 08/09/18  0444 08/10/18  0438   WBC 8.31 6.61 6.70   RBC 3.80* 3.62* 3.97*   HGB 8.2* 8.0* 8.7*   HCT 28.3* 26.6* 29.1*    260 292   MCV 75* 74* 73*   MCH 21.6* 22.1* 21.9*   MCHC 29.0* 30.1* 29.9*       Significant Diagnostics:  None    Assessment/Plan:     * Cancer of sigmoid colon    4 Days Post-Op sigmoid colectomy, doing well    Dulcolax suppository   Diet as tolerated, add boost  Pain control as needed - liquid medications (doesn't swallow pills)  OOB/ambulate  IS use  DVT ppx - lovenox  Okay to shower  Dispo: potential d/c tomorrow              Deb Rivera NP  Colorectal Surgery  [facility]

## 2018-08-10 NOTE — PLAN OF CARE
Spoke to patient in reference to DME for home, rolling walker. Pt states she feels like she will need it. Ordered through [facility]. Patient's insurer is a wellness plan only, therefore rolling walker cost will need to be covered by Case Mgmt. Notified Margy with [facility].

## 2018-08-10 NOTE — PLAN OF CARE
Problem: Occupational Therapy Goal  Goal: Occupational Therapy Goal  Goals to be met by: 8/14/18     Patient will increase functional independence with ADLs by performing:    UE Dressing with [place].  LE Dressing with [place]. GOAL MET 8/10  Grooming while standing at sink with Supervision.  Toileting from toilet with Supervision for hygiene and clothing management.   Toilet transfer to toilet with Supervision.     Outcome: Ongoing (interventions implemented as appropriate)  Goal met for LE dressing; pt progressing toward remaining goals     Comments: Continue OT POC    Cherry Ocampo OT  8/10/2018

## 2018-08-10 NOTE — ASSESSMENT & PLAN NOTE
4 Days Post-Op sigmoid colectomy, doing well    Dulcolax suppository   Diet as tolerated, add boost  Pain control as needed - liquid medications (doesn't swallow pills)  OOB/ambulate  IS use  DVT ppx - lovenox  Okay to shower  Dispo: potential d/c tomorrow

## 2018-08-11 VITALS
DIASTOLIC BLOOD PRESSURE: 61 MMHG | HEART RATE: 79 BPM | BODY MASS INDEX: 28.12 KG/M2 | HEIGHT: 66 IN | TEMPERATURE: 99 F | OXYGEN SATURATION: 99 % | WEIGHT: 175 LBS | SYSTOLIC BLOOD PRESSURE: 107 MMHG | RESPIRATION RATE: 16 BRPM

## 2018-08-11 LAB
ANION GAP SERPL CALC-SCNC: 10 MMOL/L
BASOPHILS # BLD AUTO: 0.01 K/UL
BASOPHILS NFR BLD: 0.2 %
BUN SERPL-MCNC: 5 MG/DL
CALCIUM SERPL-MCNC: 8.7 MG/DL
CHLORIDE SERPL-SCNC: 102 MMOL/L
CO2 SERPL-SCNC: 22 MMOL/L
CREAT SERPL-MCNC: 0.7 MG/DL
DIFFERENTIAL METHOD: ABNORMAL
EOSINOPHIL # BLD AUTO: 0.2 K/UL
EOSINOPHIL NFR BLD: 3.3 %
ERYTHROCYTE [DISTWIDTH] IN BLOOD BY AUTOMATED COUNT: 18.8 %
EST. GFR  (AFRICAN AMERICAN): >60 ML/MIN/1.73 M^2
EST. GFR  (NON AFRICAN AMERICAN): >60 ML/MIN/1.73 M^2
GLUCOSE SERPL-MCNC: 114 MG/DL
HCT VFR BLD AUTO: 28.3 %
HGB BLD-MCNC: 8.4 G/DL
IMM GRANULOCYTES # BLD AUTO: 0.01 K/UL
IMM GRANULOCYTES NFR BLD AUTO: 0.2 %
LYMPHOCYTES # BLD AUTO: 1.4 K/UL
LYMPHOCYTES NFR BLD: 21.3 %
MAGNESIUM SERPL-MCNC: 1.6 MG/DL
MCH RBC QN AUTO: 21.9 PG
MCHC RBC AUTO-ENTMCNC: 29.7 G/DL
MCV RBC AUTO: 74 FL
MONOCYTES # BLD AUTO: 0.7 K/UL
MONOCYTES NFR BLD: 10.1 %
NEUTROPHILS # BLD AUTO: 4.3 K/UL
NEUTROPHILS NFR BLD: 64.9 %
NRBC BLD-RTO: 0 /100 WBC
PHOSPHATE SERPL-MCNC: 4.2 MG/DL
PLATELET # BLD AUTO: 294 K/UL
PMV BLD AUTO: 9.4 FL
POTASSIUM SERPL-SCNC: 3.9 MMOL/L
RBC # BLD AUTO: 3.84 M/UL
SODIUM SERPL-SCNC: 134 MMOL/L
WBC # BLD AUTO: 6.61 K/UL

## 2018-08-11 PROCEDURE — 63600175 PHARM REV CODE 636 W HCPCS: Performed by: STUDENT IN AN ORGANIZED HEALTH CARE EDUCATION/TRAINING PROGRAM

## 2018-08-11 PROCEDURE — G8989 SELF CARE D/C STATUS: HCPCS | Mod: CK

## 2018-08-11 PROCEDURE — 25000003 PHARM REV CODE 250: Performed by: COLON & RECTAL SURGERY

## 2018-08-11 PROCEDURE — 80048 BASIC METABOLIC PNL TOTAL CA: CPT

## 2018-08-11 PROCEDURE — 85025 COMPLETE CBC W/AUTO DIFF WBC: CPT

## 2018-08-11 PROCEDURE — G8988 SELF CARE GOAL STATUS: HCPCS | Mod: CK

## 2018-08-11 PROCEDURE — 83735 ASSAY OF MAGNESIUM: CPT

## 2018-08-11 PROCEDURE — 36415 COLL VENOUS BLD VENIPUNCTURE: CPT

## 2018-08-11 PROCEDURE — G8987 SELF CARE CURRENT STATUS: HCPCS | Mod: CK

## 2018-08-11 PROCEDURE — 25000003 PHARM REV CODE 250: Performed by: NURSE PRACTITIONER

## 2018-08-11 PROCEDURE — 84100 ASSAY OF PHOSPHORUS: CPT

## 2018-08-11 PROCEDURE — 25000003 PHARM REV CODE 250: Performed by: STUDENT IN AN ORGANIZED HEALTH CARE EDUCATION/TRAINING PROGRAM

## 2018-08-11 RX ORDER — HYDROCODONE BITARTRATE AND ACETAMINOPHEN 5; 325 MG/1; MG/1
1 TABLET ORAL EVERY 4 HOURS PRN
Qty: 30 TABLET | Refills: 0 | Status: SHIPPED | OUTPATIENT
Start: 2018-08-11 | End: 2019-03-15

## 2018-08-11 RX ADMIN — OXYCODONE HYDROCHLORIDE 5 MG: 5 SOLUTION ORAL at 04:08

## 2018-08-11 RX ADMIN — OXYCODONE HYDROCHLORIDE 10 MG: 5 SOLUTION ORAL at 03:08

## 2018-08-11 RX ADMIN — CETIRIZINE HYDROCHLORIDE 10 MG: 10 TABLET, FILM COATED ORAL at 08:08

## 2018-08-11 RX ADMIN — HYDROMORPHONE HYDROCHLORIDE 0.5 MG: 1 INJECTION, SOLUTION INTRAMUSCULAR; INTRAVENOUS; SUBCUTANEOUS at 07:08

## 2018-08-11 RX ADMIN — DEXTROSE MONOHYDRATE, SODIUM CHLORIDE, AND POTASSIUM CHLORIDE: 50; 4.5; 1.49 INJECTION, SOLUTION INTRAVENOUS at 04:08

## 2018-08-11 RX ADMIN — OXYCODONE HYDROCHLORIDE 10 MG: 5 SOLUTION ORAL at 11:08

## 2018-08-11 RX ADMIN — BISACODYL 10 MG: 10 SUPPOSITORY RECTAL at 08:08

## 2018-08-11 NOTE — HOSPITAL COURSE
8/6- Lap-hand assisted sigmoid colectomy for sigmoid cancer.    Leni Henson underwent the above procedure on 8/6 as treatment for sigmoid colon cancer. She tolerated the procedure well and her post-op course was uncomplicated. Prior to discharge home on 08/11/2018 her pain was well controlled on oral medications, tolerated diet, ambulated, spontaneously voided and experienced return of bowel function. She was discharged home in good condition on POD#5.

## 2018-08-11 NOTE — PLAN OF CARE
Problem: Patient Care Overview  Goal: Plan of Care Review  Outcome: Ongoing (interventions implemented as appropriate)  POC reviewed with patient, stated understanding, AAOx4, VSS, transverse ABD incision DINESH, dermabond, no redness or swelling, x3 lap sites DINESH, WDL. Pt ambulates to BR, adequate UOP. Pt pain effectively managed with meds IVP. Ind in room, amb to BR. Pt stated last BM was 8/10. No BM this shift. Bed low, call light in reach, no signs of distress, will cont to monitor

## 2018-08-11 NOTE — ASSESSMENT & PLAN NOTE
5 Days Post-Op sigmoid colectomy, doing well    Low res diet  Pain control as needed - liquid medications (doesn't swallow pills)  OOB/ambulate  IS use  DVT ppx - lovenox  Okay to shower      Discharge today

## 2018-08-11 NOTE — NURSING
Discharge to home per wheelchair,  at bedside at this time. iV removed at this time without difficulty.  Instructions given about follow-up appointments and instructions given about Norco prescriptions.

## 2018-08-11 NOTE — PLAN OF CARE
Problem: Patient Care Overview  Goal: Plan of Care Review  Instructed to walk to allow to heal at this time. Patient incisions look well no s/s of infection. Patient continues on IV fluids at this time. Will continue to observe.

## 2018-08-11 NOTE — DISCHARGE SUMMARY
[facility]  General Surgery  Discharge Summary      Patient Name: Leni Henson  MRN: [id number]  Admission Date: 8/6/2018  Hospital Length of Stay: 5 days  Discharge Date and Time:  08/11/2018 2:43 PM  Attending Physician: Thomas Jordan MD   Discharging Provider: Ibrahima Mckeon MD  Primary Care Provider: Azikiwe K Lombard, MD    HPI:   No notes on file    Procedure(s) (LRB):  Lap sigmoid colectomy (N/A)      Indwelling Lines/Drains at time of discharge:   Lines/Drains/Airways          No matching active lines, drains, or airways        Hospital Course: 8/6- Lap-hand assisted sigmoid colectomy for sigmoid cancer.    Leni Henson underwent the above procedure on 8/6 as treatment for sigmoid colon cancer. She tolerated the procedure well and her post-op course was uncomplicated. Prior to discharge home on 08/11/2018 her pain was well controlled on oral medications, tolerated diet, ambulated, spontaneously voided and experienced return of bowel function. She was discharged home in good condition on POD#5.        Consults:   Consults         Status Ordering Provider     Inpatient consult to Midline team  Once     Provider:  (Not yet assigned)    Completed THOMAS JORDAN          Significant Diagnostic Studies: Labs:   BMP:   Recent Labs  Lab 08/10/18  0438 08/11/18 0417    114*   * 134*   K 3.7 3.9    102   CO2 26 22*   BUN 5* 5*   CREATININE 0.7 0.7   CALCIUM 8.7 8.7   MG 1.8 1.6   , CMP   Recent Labs  Lab 08/10/18  0438 08/11/18 0417   * 134*   K 3.7 3.9    102   CO2 26 22*    114*   BUN 5* 5*   CREATININE 0.7 0.7   CALCIUM 8.7 8.7   ANIONGAP 8 10   ESTGFRAFRICA >60.0 >60.0   EGFRNONAA >60.0 >60.0    and CBC   Recent Labs  Lab 08/10/18  0438 08/11/18 0417   WBC 6.70 6.61   HGB 8.7* 8.4*   HCT 29.1* 28.3*    294     Radiology: X-Ray: CXR: X-Ray Chest 1 View (CXR): No results found for this visit on 08/06/18.  CT scan: CT ABDOMEN PELVIS  "WITH CONTRAST: No results found for this visit on 08/06/18. and CT ABDOMEN PELVIS WITHOUT CONTRAST: No results found for this visit on 08/06/18.    Pending Diagnostic Studies:     None        Final Active Diagnoses:    Diagnosis Date Noted POA    PRINCIPAL PROBLEM:  Cancer of sigmoid colon [C18.7] 08/03/2018 Yes      Problems Resolved During this Admission:    Diagnosis Date Noted Date Resolved POA      Discharged Condition: good    Disposition: Home or Self Care    Follow Up:  Follow-up Information     Thomas Kim MD In 2 weeks.    Specialty:  Colon and Rectal Surgery  Why:  For wound re-check/Appt: 8/23/18 at 09:15 AM.  Contact information:  [facility] [id number] [phone]             [facility]    Specialty:  DME Provider  Why:  Home DME: Rolling walker to be delivered to hospital room (Case management will pay for.)  Contact information:  [address]  [address]  [place] [id number] [phone]             Thomas Kim MD In 2 weeks.    Specialty:  Colon and Rectal Surgery  Why:  Post-op  Contact information:  [facility] [id number] [phone]                 Patient Instructions:     WALKER FOR HOME USE   Order Comments: Case management will need to pay for RW. Per , insurer is a wellness plan only.   Order Specific Question Answer Comments   Type of Walker: Adult (5'4"-6'6")    With wheels? Yes    Height: 5' 6" (1.676 m)    Weight: 79.4 kg (175 lb)    Length of need (1-99 months): 99    Does patient have medical equipment at home? none    Please check all that apply: Patient's condition impairs ambulation.    Vendor: [facility]    Expected Date of Delivery: 8/10/2018      Diet Adult Regular     Lifting restrictions   Order Comments: No more than 10 lbs for 6 weeks.     Notify your health care provider if you experience any of the following:  temperature >100.4     Notify your health care provider if you experience any of the " following:  worsening rash     Notify your health care provider if you experience any of the following:  persistent dizziness, light-headedness, or visual disturbances     Notify your health care provider if you experience any of the following:  persistent nausea and vomiting or diarrhea     Remove dressing in 48 hours     Activity as tolerated       Medications:  Reconciled Home Medications:      Medication List      START taking these medications    HYDROcodone-acetaminophen 5-325 mg per tablet  Commonly known as:  NORCO  Take 1 tablet by mouth every 4 (four) hours as needed for Pain.        CONTINUE taking these medications    cetirizine 10 MG tablet  Commonly known as:  ZYRTEC  Take 1 tablet (10 mg total) by mouth once daily.     fluticasone 50 mcg/actuation nasal spray  Commonly known as:  FLONASE  1 spray (50 mcg total) by Each Nare route 2 (two) times daily as needed.     naproxen 500 MG tablet  Commonly known as:  NAPROSYN  Take 1 tablet (500 mg total) by mouth 2 (two) times daily with meals.          Time spent on the discharge of patient:   minutes    Ibrahima Mckeon MD  General Surgery  [facility]

## 2018-08-12 NOTE — PT/OT/SLP DISCHARGE
Occupational Therapy Discharge Summary    Lnei Henson  MRN: [id number]   Principal Problem: Cancer of sigmoid colon      Patient Discharged from acute Occupational Therapy on 8/12/18.  Please refer to prior OT note dated 8/10/18 for functional status.    Assessment:      Goals partially met.    Objective:     GOALS:   Multidisciplinary Problems     Occupational Therapy Goals     Not on file          Multidisciplinary Problems (Resolved)        Problem: Occupational Therapy Goal    Goal Priority Disciplines Outcome Interventions   Occupational Therapy Goal   (Resolved)     OT, PT/OT Outcome(s) achieved    Description:  Goals to be met by: 8/14/18     Patient will increase functional independence with ADLs by performing:    UE Dressing with [place].  LE Dressing with [place]. GOAL MET 8/10  Grooming while standing at sink with Supervision.  Toileting from toilet with Supervision for hygiene and clothing management.   Toilet transfer to toilet with Supervision.                       Reasons for Discontinuation of Therapy Services  Transfer to alternate level of care.      Plan:     Patient Discharged to: Home no OT services needed; OT rec HH but pt insurance does not cover post-acute services, pt purchased RW for home use    Cherry Ocampo, OT  8/12/2018

## 2018-08-12 NOTE — PLAN OF CARE
Problem: Occupational Therapy Goal  Goal: Occupational Therapy Goal  Goals to be met by: 8/14/18     Patient will increase functional independence with ADLs by performing:    UE Dressing with Drums.  LE Dressing with Drums. GOAL MET 8/10  Grooming while standing at sink with Supervision.  Toileting from toilet with Supervision for hygiene and clothing management.   Toilet transfer to toilet with Supervision.      Outcome: Outcome(s) achieved Date Met: 08/12/18  Goals partially met; pt d/c from hospital

## 2018-08-17 ENCOUNTER — OFFICE VISIT (OUTPATIENT)
Dept: FAMILY MEDICINE | Facility: CLINIC | Age: 48
End: 2018-08-17
Payer: COMMERCIAL

## 2018-08-17 VITALS
HEIGHT: 66 IN | BODY MASS INDEX: 29.76 KG/M2 | RESPIRATION RATE: 17 BRPM | SYSTOLIC BLOOD PRESSURE: 108 MMHG | TEMPERATURE: 99 F | DIASTOLIC BLOOD PRESSURE: 80 MMHG | HEART RATE: 88 BPM | WEIGHT: 185.19 LBS | OXYGEN SATURATION: 97 %

## 2018-08-17 DIAGNOSIS — R30.0 DYSURIA: ICD-10-CM

## 2018-08-17 DIAGNOSIS — C18.7 MALIGNANT NEOPLASM OF SIGMOID COLON: Primary | ICD-10-CM

## 2018-08-17 PROCEDURE — 99999 PR PBB SHADOW E&M-EST. PATIENT-LVL III: CPT | Mod: PBBFAC,,, | Performed by: FAMILY MEDICINE

## 2018-08-17 PROCEDURE — 99213 OFFICE O/P EST LOW 20 MIN: CPT | Mod: S$GLB,,, | Performed by: FAMILY MEDICINE

## 2018-08-17 RX ORDER — METRONIDAZOLE 500 MG/1
TABLET ORAL
Refills: 0 | COMMUNITY
Start: 2018-08-03 | End: 2018-09-04

## 2018-08-17 RX ORDER — NEOMYCIN SULFATE 500 MG/1
TABLET ORAL
Refills: 0 | COMMUNITY
Start: 2018-08-03 | End: 2018-09-04

## 2018-08-22 NOTE — PROGRESS NOTES
"  CRS Office Post Operative Visit    SUBJECTIVE:     Chief Complaint: followup from surgery.     Procedure:   8/6/18: lap sigmoid colectomy     Indication: Ms. Henson is a 48-year-old woman who has had intermittent   left-sided abdominal pain associated with intermittent blood in her bowel   movements for the past 2-1/2 years.  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  She   underwent a staging workup and demonstrated no obvious evidence of metastatic   disease.  She was therefore counseled regarding colectomy and lymphadenectomy   for both resection and to complete her staging.    CEA = 5.7      Significant post operative events: uncomplicated.  Discharge on POD#5    Pathology:   Pathologic Staging (pTNM): pT3 pN0 pMX  Number examined: 17  Number involved: 0  Normal MSI testing    Current Status:  Overall doing well. Active.  2-3 bowel movements per day.  No leakage.  Eating well.  No abdominal pain.  Feels significantly improved    Review of Systems:  Review of Systems   Constitutional: Positive for weight loss. Negative for chills, diaphoresis, fever and malaise/fatigue.   HENT: Negative for congestion.    Respiratory: Negative for shortness of breath.    Cardiovascular: Negative for chest pain and leg swelling.   Gastrointestinal: Negative for abdominal pain, blood in stool, constipation, nausea and vomiting.   Genitourinary: Negative for dysuria.   Musculoskeletal: Negative for back pain and myalgias.   Skin: Negative for rash.   Neurological: Negative for dizziness and weakness.   Endo/Heme/Allergies: Does not bruise/bleed easily.   Psychiatric/Behavioral: Negative for depression.       OBJECTIVE:     Vital Signs (Most Recent)  /66 (BP Location: Left arm, Patient Position: Sitting, BP Method: Large (Automatic))   Pulse 65   Ht 5' 6" (1.676 m)   Wt 84 kg (185 lb 3 oz)   BMI 29.89 kg/m²     Physical Exam:  General: Black or  " American female in no distress   Respiratory: respirations are even and unlabored  Cardiac: regular rate  Abdomen:  Pfannenstiel incision is healed well.  All port sites have healed well. No hernia.  Nontender.  Extremities: Warm dry and intact  Anorectal:  Deferred    ASSESSMENT/PLAN:     Leni was seen today for post-op problem.    Diagnoses and all orders for this visit:    Cancer of sigmoid colon  -     Case request GI: COLONOSCOPY with Julio  -     Ambulatory Referral to Hematology / Oncology        48 y.o. female post op from lap sigmoid resection for T3N0 cancer.    Overall she is doing very well. We do need to plan for completion colonoscopy given that she had incomplete colonoscopy performed diagnosed her near obstructing sigmoid colon cancer.  Additionally, given that she is so young, I will refer her to Oncology for evaluation for adjuvant chemotherapy.  I will see her back in 6 weeks for colonoscopy.  Case request and placed    SARAHI Kim MD  Staff Surgeon  Colon & Rectal Surgery

## 2018-08-23 ENCOUNTER — OFFICE VISIT (OUTPATIENT)
Dept: SURGERY | Facility: CLINIC | Age: 48
End: 2018-08-23
Payer: COMMERCIAL

## 2018-08-23 ENCOUNTER — TELEPHONE (OUTPATIENT)
Dept: ENDOSCOPY | Facility: HOSPITAL | Age: 48
End: 2018-08-23

## 2018-08-23 VITALS
SYSTOLIC BLOOD PRESSURE: 104 MMHG | HEIGHT: 66 IN | BODY MASS INDEX: 29.76 KG/M2 | HEART RATE: 65 BPM | WEIGHT: 185.19 LBS | DIASTOLIC BLOOD PRESSURE: 66 MMHG

## 2018-08-23 DIAGNOSIS — Z12.11 SPECIAL SCREENING FOR MALIGNANT NEOPLASMS, COLON: Primary | ICD-10-CM

## 2018-08-23 DIAGNOSIS — C18.7 CANCER OF SIGMOID COLON: Primary | ICD-10-CM

## 2018-08-23 PROCEDURE — 99999 PR PBB SHADOW E&M-EST. PATIENT-LVL III: CPT | Mod: PBBFAC,,, | Performed by: COLON & RECTAL SURGERY

## 2018-08-23 PROCEDURE — 99024 POSTOP FOLLOW-UP VISIT: CPT | Mod: S$GLB,,, | Performed by: COLON & RECTAL SURGERY

## 2018-08-23 RX ORDER — POLYETHYLENE GLYCOL 3350, SODIUM SULFATE ANHYDROUS, SODIUM BICARBONATE, SODIUM CHLORIDE, POTASSIUM CHLORIDE 236; 22.74; 6.74; 5.86; 2.97 G/4L; G/4L; G/4L; G/4L; G/4L
4 POWDER, FOR SOLUTION ORAL ONCE
Qty: 4000 ML | Refills: 0 | Status: SHIPPED | OUTPATIENT
Start: 2018-08-23 | End: 2018-08-24

## 2018-08-23 NOTE — PROGRESS NOTES
"Chief Complaint   Patient presents with    Urinary Tract Infection    Vaginal Itching       Leni Henson is a 48 y.o. female who presents per the Chief Complaint.  Pt is known to me and was last seen by me on 7/30/2018.  All known chronic medical issues have been documented.       HPI     ROS  Review of Systems   Constitutional: Negative.  Negative for activity change, appetite change, chills, diaphoresis, fatigue, fever and unexpected weight change.   HENT: Negative.  Negative for congestion, ear pain, hearing loss, nosebleeds, postnasal drip, rhinorrhea, sinus pressure, sneezing, sore throat and trouble swallowing.    Eyes: Negative for pain and visual disturbance.   Respiratory: Negative for cough, choking and shortness of breath.    Cardiovascular: Negative for chest pain and leg swelling.   Gastrointestinal: Positive for abdominal pain (post surgical; improving). Negative for constipation, diarrhea, nausea and vomiting.   Genitourinary: Positive for dysuria and vaginal pain (irritation). Negative for decreased urine volume, difficulty urinating, dyspareunia, enuresis, flank pain, frequency, genital sores, hematuria, menstrual problem, pelvic pain, urgency, vaginal bleeding and vaginal discharge.   Musculoskeletal: Negative.  Negative for arthralgias, back pain, gait problem, joint swelling and myalgias.   Skin: Negative.    Allergic/Immunologic: Negative for environmental allergies and food allergies.   Neurological: Negative.  Negative for dizziness, seizures, syncope, weakness, light-headedness and headaches.   Psychiatric/Behavioral: Negative.  Negative for confusion, decreased concentration, dysphoric mood and sleep disturbance. The patient is not nervous/anxious.        Physical Exam  Vitals:    08/17/18 1603   BP: 108/80   Pulse: 88   Resp: 17   Temp: 98.6 °F (37 °C)    Body mass index is 29.89 kg/m².  Weight: 84 kg (185 lb 3 oz)   Height: 5' 6" (167.6 cm)     Physical Exam   Constitutional: She is " oriented to person, place, and time. She appears well-developed and well-nourished. She is active and cooperative.  Non-toxic appearance. She does not have a sickly appearance. She does not appear ill. No distress.   HENT:   Head: Normocephalic and atraumatic.   Right Ear: Hearing and external ear normal. No decreased hearing is noted.   Left Ear: Hearing and external ear normal. No decreased hearing is noted.   Nose: Nose normal. No rhinorrhea or nasal deformity.   Mouth/Throat: Uvula is midline and oropharynx is clear and moist. She does not have dentures. Normal dentition.   Eyes: Conjunctivae, EOM and lids are normal. Pupils are equal, round, and reactive to light. Right eye exhibits no chemosis, no discharge and no exudate. No foreign body present in the right eye. Left eye exhibits no chemosis, no discharge and no exudate. No foreign body present in the left eye. No scleral icterus.   Neck: Normal range of motion and full passive range of motion without pain. Neck supple.   Cardiovascular: Normal rate, regular rhythm, S1 normal, S2 normal and normal heart sounds. Exam reveals no gallop and no friction rub.   No murmur heard.  Pulmonary/Chest: Effort normal and breath sounds normal. No accessory muscle usage. No respiratory distress. She has no decreased breath sounds. She has no wheezes. She has no rhonchi. She has no rales.   Abdominal: Soft. Normal appearance. She exhibits no distension. There is no hepatosplenomegaly. There is generalized tenderness and tenderness in the suprapubic area. There is no rigidity, no rebound, no guarding and no CVA tenderness.   Musculoskeletal: Normal range of motion.   Neurological: She is alert and oriented to person, place, and time. She has normal strength. No cranial nerve deficit or sensory deficit. She exhibits normal muscle tone. She displays no seizure activity. Coordination and gait normal.   Skin: Skin is warm, dry and intact. No rash noted. She is not diaphoretic.    Psychiatric: She has a normal mood and affect. Her speech is normal and behavior is normal. Judgment and thought content normal. Cognition and memory are normal. She is attentive.       Assessment & Plan    1. Malignant neoplasm of sigmoid colon  Surgery with no complications; pathology report indicate total malignancy removed with no lymph node involvement.  Will follow up with Oncology to discuss is chemotherapy is still considered following pathology report.    2. Dysuria  Likely associated with indwelling catheter following surgery.  Recommended OTC AZO to alleviate discomfort and continue adequate fluid intake.      Follow up documented    ACTIVE MEDICAL ISSUES:  Documented in Problem List    PAST MEDICAL HISTORY  Documented    PAST SURGICAL HISTORY:  Documented    SOCIAL HISTORY:  Documented    FAMILY HISTORY:  Documented    ALLERGIES AND MEDICATIONS: updated and reviewed.  Documented    Health Maintenance       Date Due Completion Date    Pneumococcal PCV13 (High Risk) (1 - PCV13 Required) 06/29/1974 ---    TETANUS VACCINE 06/29/1990 ---    Pneumococcal PPSV23 (High Risk) (1) 06/29/1990 ---    Influenza Vaccine 08/01/2018 ---    Mammogram 07/17/2020 7/17/2018    Lipid Panel 05/14/2023 5/14/2018

## 2018-09-04 ENCOUNTER — TELEPHONE (OUTPATIENT)
Dept: HEMATOLOGY/ONCOLOGY | Facility: CLINIC | Age: 48
End: 2018-09-04

## 2018-09-04 ENCOUNTER — INITIAL CONSULT (OUTPATIENT)
Dept: HEMATOLOGY/ONCOLOGY | Facility: CLINIC | Age: 48
End: 2018-09-04
Payer: COMMERCIAL

## 2018-09-04 VITALS
HEIGHT: 67 IN | BODY MASS INDEX: 29.69 KG/M2 | SYSTOLIC BLOOD PRESSURE: 111 MMHG | DIASTOLIC BLOOD PRESSURE: 73 MMHG | HEART RATE: 61 BPM | TEMPERATURE: 98 F | WEIGHT: 189.13 LBS | OXYGEN SATURATION: 100 % | RESPIRATION RATE: 18 BRPM

## 2018-09-04 DIAGNOSIS — R10.9 ABDOMINAL PAIN, UNSPECIFIED ABDOMINAL LOCATION: ICD-10-CM

## 2018-09-04 DIAGNOSIS — Z85.038 HISTORY OF COLON CANCER, STAGE II: Primary | ICD-10-CM

## 2018-09-04 PROCEDURE — 99999 PR PBB SHADOW E&M-EST. PATIENT-LVL IV: CPT | Mod: PBBFAC,,, | Performed by: INTERNAL MEDICINE

## 2018-09-04 PROCEDURE — 99205 OFFICE O/P NEW HI 60 MIN: CPT | Mod: S$GLB,,, | Performed by: INTERNAL MEDICINE

## 2018-09-04 NOTE — PROGRESS NOTES
REASON FOR VISIT:  Colon cancer.    REFERRING PHYSICIAN:  Dr. Henry Kim with Colon and Rectal Surgery.    HISTORY OF PRESENT ILLNESS:  Ms. Leni Henson is a 48-year-old female who has   been having left-sided abdominal pain for about 2-3 years along with occasional   episodes of blood in the stool.  She finally sought care and underwent a   colonoscopy on 07/25/2018 and that revealed likely malignant completely   obstructing tumor in the sigmoid colon, which was biopsied revealing moderately   differentiated colon cancer.  She underwent restaging CT scans and was noted to   have a 4 mm nodule in the lung and repeat was recommended at 12 months.  She was   then taken for sigmoid colectomy, which she underwent on 08/06/2018 and   Pathology from that revealed a pathologic T3 N0 M0, 17 lymph nodes were   examined, which were negative.  It was moderately differentiated, no perineural   invasion, no lymphovascular invasion, no localized perforation.  All margins   were negative.  She comes in to discuss further management.  She essentially   feels well and denies any complaints at this time.  She does note some abdominal   pain since surgery and she is planning to see Dr. Kim for that.  She is   accompanied to the clinic today with her fiance.    REVIEW OF SYSTEMS:  CONSTITUTIONAL:  Negative for appetite change, fatigue, or unexpected weight   change.  HEENT:  Negative for mouth sores.  EYES:  Negative for visual disturbance.  RESPIRATORY:  Negative for cough and shortness of breath.  CARDIOVASCULAR:  Negative for chest pain.  GASTROINTESTINAL:  Reports some abdominal discomfort.  Denies nausea, vomiting,   diarrhea or constipation.  GENITOURINARY:  Negative for frequency.  MUSCULOSKELETAL:  Negative for back pain.  SKIN:  Negative for rash.  NEUROLOGIC:  Negative for headaches.  HEMATOLOGIC:  Negative for adenopathy.  PSYCHIATRIC AND BEHAVIORAL:  She is not nervous or anxious.    COMORBID MEDICAL  CONDITIONS:  None.    SURGICAL HISTORY:  Tubal ligation, hysterectomy and cholecystectomy.    FAMILY HISTORY:  Brother with colon cancer at age 62.  Mother with breast cancer   at 70.  Sister with breast cancer at 48.    SOCIAL HISTORY:  Does not do any drugs or alcohol and does not smoke.    PHYSICAL EXAMINATION:  VITAL SIGNS:  Reviewed in EPIC.  GENERAL:  The patient is oriented to person, place and time, appears moderately   built, moderately nourished, in no acute distress.  HEENT:  Right and left ears are normal.  Oropharynx is clear and moist, no   oropharyngeal exudate.  Teeth, gums and lips are normal.  No sinus tenderness.    Palate, tongue, posterior pharynx are normal.  EYES:  Conjunctivae and lids are normal.  NECK:  Supple, no JVD, no thyromegaly.  CARDIOVASCULAR:  Normal rate, regular rhythm, intact distal pulses.  No murmurs   heard.  No edema or tenderness in the extremities.  PULMONARY AND CHEST:  Bilateral equal breath sounds heard, no rales, rhonchi or   wheezes.  ABDOMEN:  Soft, nontender.  She does have a well-healed abdominal surgical scars   noted.  Gait is normal.  No clubbing or cyanosis.  LYMPHADENOPATHY:  No submental, submandibular, cervical, or supraclavicular   lymph nodes noted.  NEUROLOGIC:  Alert and oriented to person, place and time, has normal strength,   normal reflexes.  No sensory deficit.  Gait is normal.  SKIN:  Warm, dry and intact.  No bruising, no lesions, no rashes.  No cyanosis.    Nails show no clubbing.  PSYCHIATRIC:  Normal mood and affect.  Speech, behavior, judgment, thought   content, cognition and memory are normal.    LABORATORY DATA:  From 08/11/2018 reveals a CBC with a hemoglobin of 8.4,   hematocrit 28.3, WBC 6.6, and platelets 294.  BMP with a glucose of 114, sodium   134, otherwise within normal limits.  CEA from 07/27/2018, which was 5.7.    ASSESSMENT AND PLAN:  Ms. Leni Henson is a 48-year-old female with a   pathologic T3 N0 M0 with no high-risk  features and emesis comes in today to   discuss further management.  She essentially feels well.  We discussed briefly   the rationale for observation versus capecitabine based chemotherapy.  She   really does not have any risk factors and has elected observation at this time,   which is reasonable.  We will follow her with a CT scan in six months to   reassess the lung nodule and if no evidence of any growth or re-disease, she   will proceed with once a year followup.  However, of note, she requires a full   colonoscopy and she will follow up with Dr. Kim due to that.  She has some   abdominal pain post surgery.  We will go ahead and obtain CT scan and will call   her with the results of the CT.  The distress score is zero and no intervention   is indicated.      SPS/HN  dd: 09/04/2018 16:09:01 (CDT)  td: 09/04/2018 23:28:20 (CDT)  Doc ID   #[id number]  Job ID #[id number]    CC:       Distress Score    Distress Score: 0        Practical Problems Physical Problems   : No Appearance: No   Housing: No Bathing / Dressing: No   Insurance / Financial: No Breathing: No    Transportation: No  Changes in Urination: No    Work / School: No  Constipation: No   Treatment Decisions: No  Diarrhea: No     Eating: No    Family Problems Fatigue: No    Dealing with Children: No Feeling Swollen: No    Dealing with Partner: No Fevers: No    Ability to Have Children: No  Getting Around: No    Family Health Issues: No  Indigestion: No     Memory / Concentration: No   Emotional Problems Mouth Sores: No    Depression: No  Nausea: No    Fears: No  Nose Dry / Congested: No    Nervousness: No  Pain: No    Sadness: No Sexual: No    Worry: No Skin Dry / Itchy: No    Loss of Interest in Usual Activities: No Sleep: No     Substance Abuse: No    Spiritual/Religions Concerns Tingling in Hands / Feet: No   Spritual / Rastafari Concerns: No         Other Problems            Dictated # [id number]

## 2018-09-04 NOTE — Clinical Note
Also schedule her to see Jami for GeneticsSchedule CBC,CMP, CEA, CT cheat, abdomen/pelvis and see me in 6 months

## 2018-09-04 NOTE — LETTER
September 4, 2018      Thomas Kim MD  [address] [facility] [address]           [facility]  [address]  [place] [address]  Phone: [phone]          Patient: Leni Henson   MR Number: [id number]   YOB: 1970   Date of Visit: 9/4/2018       Dear Dr. Thomas Kim:    Thank you for referring Leni Henson to me for evaluation. Attached you will find relevant portions of my assessment and plan of care.    If you have questions, please do not hesitate to call me. I look forward to following Leni Henson along with you.    Sincerely,    Cesia Back MD    Enclosure  CC:  No Recipients    If you would like to receive this communication electronically, please contact [email or web address] or [phone] to request more information on [email or web address] Link access.    For providers and/or their staff who would like to refer a patient to [facility], please contact us through our one-stop-shop provider referral line, Lana Gomes, at [phone].    If you feel you have received this communication in error or would no longer like to receive these types of communications, please e-mail [email or web address]

## 2018-09-04 NOTE — TELEPHONE ENCOUNTER
----- Message from Cesia Back MD sent at 9/4/2018  3:57 PM CDT -----  Also schedule her to see Jami for Genetics  Schedule CBC,CMP, CEA, CT cheat, abdomen/pelvis and see me in 6 months

## 2018-09-05 ENCOUNTER — TELEPHONE (OUTPATIENT)
Dept: SURGERY | Facility: CLINIC | Age: 48
End: 2018-09-05

## 2018-09-05 ENCOUNTER — HOSPITAL ENCOUNTER (OUTPATIENT)
Dept: RADIOLOGY | Facility: HOSPITAL | Age: 48
Discharge: HOME | End: 2018-09-05
Attending: INTERNAL MEDICINE
Payer: COMMERCIAL

## 2018-09-05 DIAGNOSIS — R10.9 ABDOMINAL PAIN, UNSPECIFIED ABDOMINAL LOCATION: ICD-10-CM

## 2018-09-05 DIAGNOSIS — Z85.038 HISTORY OF COLON CANCER, STAGE II: ICD-10-CM

## 2018-09-05 PROCEDURE — 25500020 PHARM REV CODE 255: Performed by: INTERNAL MEDICINE

## 2018-09-05 PROCEDURE — 74177 CT ABD & PELVIS W/CONTRAST: CPT | Mod: 26,,, | Performed by: RADIOLOGY

## 2018-09-05 PROCEDURE — 74177 CT ABD & PELVIS W/CONTRAST: CPT | Mod: TC

## 2018-09-05 RX ADMIN — IOHEXOL 100 ML: 350 INJECTION, SOLUTION INTRAVENOUS at 04:09

## 2018-09-05 RX ADMIN — IOHEXOL 15 ML: 350 INJECTION, SOLUTION INTRAVENOUS at 04:09

## 2018-09-05 RX ADMIN — IOHEXOL 15 ML: 350 INJECTION, SOLUTION INTRAVENOUS at 03:09

## 2018-09-05 NOTE — TELEPHONE ENCOUNTER
Contacted the patient regarding scheduling genetic counseling appointment per .  The patient is scheduled to be seen on Tuesday 9/11/18 at 3:30 pm at the [facility] location.  The patient voiced understanding of appointment date, time, and location.  Reminder letter mailed to the patient.

## 2018-09-06 ENCOUNTER — OFFICE VISIT (OUTPATIENT)
Dept: SURGERY | Facility: CLINIC | Age: 48
End: 2018-09-06
Payer: COMMERCIAL

## 2018-09-06 VITALS
WEIGHT: 187.38 LBS | HEART RATE: 55 BPM | DIASTOLIC BLOOD PRESSURE: 72 MMHG | BODY MASS INDEX: 29.09 KG/M2 | SYSTOLIC BLOOD PRESSURE: 112 MMHG

## 2018-09-06 DIAGNOSIS — N89.8 VAGINAL LESION: Primary | ICD-10-CM

## 2018-09-06 DIAGNOSIS — K59.00 CONSTIPATION, UNSPECIFIED CONSTIPATION TYPE: ICD-10-CM

## 2018-09-06 PROCEDURE — 99024 POSTOP FOLLOW-UP VISIT: CPT | Mod: S$GLB,,, | Performed by: COLON & RECTAL SURGERY

## 2018-09-06 PROCEDURE — 99999 PR PBB SHADOW E&M-EST. PATIENT-LVL III: CPT | Mod: PBBFAC,,, | Performed by: COLON & RECTAL SURGERY

## 2018-09-06 NOTE — PROGRESS NOTES
CRS Office Followup Visit    SUBJECTIVE:     Chief Complaint: followup from surgery.     Procedure:   8/6/18: lap sigmoid colectomy     Indication: Ms. Henson is a 48-year-old woman who has had intermittent   left-sided abdominal pain associated with intermittent blood in her bowel   movements for the past 2-1/2 years.  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  She   underwent a staging workup and demonstrated no obvious evidence of metastatic   disease.  She was therefore counseled regarding colectomy and lymphadenectomy   for both resection and to complete her staging.    CEA = 5.7      Significant post operative events: uncomplicated.  Discharge on POD#5    Pathology:   Pathologic Staging (pTNM): pT3 pN0 pMX  Number examined: 17  Number involved: 0  Normal MSI testing    Current Status:    She presents today for follow-up secondary to abdominal pain.  She has had pain over the past 3 weeks.  The pain is suprapubic.  This is associated with a change in her bowel movements.  She has not had a bowel movement past 3 days.  She was recently seen by the oncologist.  A CT scan was performed demonstrating thickening of the rectovaginal septum.  Her flexible sigmoidoscopy and colonoscopy have been normal.  No vaginal discharge.    No dysuria    Review of Systems:  Review of Systems   Constitutional: Positive for weight loss. Negative for chills, diaphoresis, fever and malaise/fatigue.   HENT: Negative for congestion.    Respiratory: Negative for shortness of breath.    Cardiovascular: Negative for chest pain and leg swelling.   Gastrointestinal: Negative for abdominal pain, blood in stool, constipation, nausea and vomiting.   Genitourinary: Negative for dysuria.   Musculoskeletal: Negative for back pain and myalgias.   Skin: Negative for rash.   Neurological: Negative for dizziness and weakness.   Endo/Heme/Allergies: Does not bruise/bleed easily.    Psychiatric/Behavioral: Negative for depression.       OBJECTIVE:     Vital Signs (Most Recent)  /72 (BP Location: Right arm, Patient Position: Sitting, BP Method: Medium (Automatic))   Pulse (!) 55   Wt 85 kg (187 lb 6.3 oz)   BMI 29.09 kg/m²     Physical Exam:  General: Black or  female in no distress   Respiratory: respirations are even and unlabored  Cardiac: regular rate  Abdomen:  Pfannenstiel incision is healed well.  All port sites have healed well. No hernia.    Tender in the suprapubic region.  Extremities: Warm dry and intact  Anorectal:  Deferred    Imaging: CT from 9/3/18 was reviewed.  No evidence of leak around the anastomosis.  No bowel obstruction.  Contrast moves into the cecum.      ASSESSMENT/PLAN:     Leni was seen today for abdominal pain.    Diagnoses and all orders for this visit:    Vaginal lesion  -     Ambulatory Referral to Gynecology    Constipation, unspecified constipation type        48 y.o. female post op from lap sigmoid resection for T3N0 cancer.   She is a stage II cancer.  She has been seen by Oncology in no adjuvant therapy has been offered.  She presents today for abdominal pain.  This may be related to constipation.  Therefore MiraLax was recommended.  In regards to the vaginal abnormality seen on her CT scan, I referred her to gynecology for evaluation.  Lastly, she does need a completion colonoscopy given that she had incomplete colonoscopy performed with her near obstructing sigmoid colon cancer.  This has been scheduled        SARAHI Kim MD  Staff Surgeon  Colon & Rectal Surgery

## 2018-09-11 ENCOUNTER — OFFICE VISIT (OUTPATIENT)
Dept: SURGERY | Facility: CLINIC | Age: 48
End: 2018-09-11
Payer: COMMERCIAL

## 2018-09-11 DIAGNOSIS — Z71.83 ENCOUNTER FOR NONPROCREATIVE GENETIC COUNSELING: Primary | ICD-10-CM

## 2018-09-11 DIAGNOSIS — Z85.038 HISTORY OF COLON CANCER, STAGE II: ICD-10-CM

## 2018-09-11 DIAGNOSIS — Z80.42 FAMILY HISTORY OF PROSTATE CANCER: ICD-10-CM

## 2018-09-11 DIAGNOSIS — Z80.3 FAMILY HISTORY OF BREAST CANCER: ICD-10-CM

## 2018-09-11 PROCEDURE — 99203 OFFICE O/P NEW LOW 30 MIN: CPT | Mod: S$GLB,,, | Performed by: NURSE PRACTITIONER

## 2018-09-11 NOTE — LETTER
September 11, 2018      Cesia aBck MD  [address]  [facility] [address]           [facility]  [address], [address]  [facility] [address]  Phone: [phone]  Fax: [phone]          Patient: Leni Henson   MR Number: [id number]   YOB: 1970   Date of Visit: 9/11/2018       Dear Dr. Cesia Back:    Thank you for referring Leni Henson to me for evaluation. Attached you will find relevant portions of my assessment and plan of care.    If you have questions, please do not hesitate to call me. I look forward to following Leni Henson along with you.    Sincerely,    LUIS ENRIQUE Kennedy    Enclosure  CC:  No Recipients    If you would like to receive this communication electronically, please contact [email or web address] or [phone] to request more information on [employer] access.    For providers and/or their staff who would like to refer a patient to [facility], please contact us through our one-stop-shop provider referral line, [facility], at [phone].    If you feel you have received this communication in error or would no longer like to receive these types of communications, please e-mail [email or web address]

## 2018-09-11 NOTE — PROGRESS NOTES
Mrs Henson presents for genetic counseling, referred by Dr Back. She is a 48 year old female with recent diagnosis of colon cancer. See pedigree for full family history which will be scanned into Epic media.  This patient does not have a known hereditary cancer genetic mutation on either side of the family and does not have known Ashkenazi Buddhism ancestry.      We reviewed her medical and family history and discussed the genetics of breast cancer, cancer risks associated with a hereditary predisposition to cancer, and the benefits, risks, and limitations of genetic testing according to current NCCN guidelines.  Discussed sporadic verses family clustering verses hereditary cancer. The patients personal and family history raises the possibility of a genetic susceptibility to colon, breast, and prostate cancer. Discussed cancer susceptibility genes associated with these hereditary cancers.      Possible results of genetic testing: positive result would mean that a mutation known to be associated with a higher risk of cancer was identified; negative result would mean that no mutation known to increase the risk of cancer was identified; variant of uncertain significance (VUS) in which a genetic change was identified in a gene, but it is unclear if this change causes an increase in cancer risk normally associated with mutations in the gene. There is an estimated 1-2% chance of a reported variant of uncertain significance in BRCA1 and BRCA2 and up to a 30% with newer genes included in multigene panels. Due to the clinical uncertainty of this type of change, VUSs are not used to make medical management decisions for a patient. Finally, I advised the patient that her medical management may change based on these results and may include increased surveillance, use of chemoprevention, or prophylactic surgery. A tailored cancer prevention plan and implications of the patients test results for relatives will be reviewed once  results are available.  Specific cancer risks vary depending on the tumor suppressor gene in which a mutation arises. We reviewed that if she carries a mutation within an autosomal inherited gene, her children would have a 50% chance of having the same mutation, along with her siblings. Also discussed if her testing is negative, her sister with breast cancer would also be recommended for hereditary breast cancer genetic testing.      Discussed the cost of testing, various labs which can conduct genetic testing and potential insurance coverage. She desires to proceed with testing, expressed her understanding of the information discussed today and provided informed consent for testing.          RECOMMENDATIONS:                                                                1. Colaris with [facility] through [employer], results expected in approximately 2-3 weeks.   2. Post test counseling will be provided once results are available with healthcare management per results, including testing of any additional family members if pathogenic mutation is identified.    Time in counseling today 45 min, total time 45 min (entire time spent in face to face counseling)

## 2018-09-19 ENCOUNTER — TELEPHONE (OUTPATIENT)
Dept: SURGERY | Facility: CLINIC | Age: 48
End: 2018-09-19

## 2018-09-19 NOTE — TELEPHONE ENCOUNTER
----- Message from Tia Bone sent at 9/19/2018  9:05 AM CDT -----  Contact: Boogie, , [phone] or [phone]  Pt  called stating she is having a leakage where puss is coming out it from her surgery on last month    Contact: Boogie, , [phone] or [phone]

## 2018-09-20 ENCOUNTER — OFFICE VISIT (OUTPATIENT)
Dept: SURGERY | Facility: CLINIC | Age: 48
End: 2018-09-20
Payer: COMMERCIAL

## 2018-09-20 VITALS
BODY MASS INDEX: 30.79 KG/M2 | HEART RATE: 49 BPM | SYSTOLIC BLOOD PRESSURE: 119 MMHG | WEIGHT: 191.56 LBS | DIASTOLIC BLOOD PRESSURE: 77 MMHG | HEIGHT: 66 IN

## 2018-09-20 DIAGNOSIS — C18.7 CANCER OF SIGMOID COLON: Primary | ICD-10-CM

## 2018-09-20 PROCEDURE — 99999 PR PBB SHADOW E&M-EST. PATIENT-LVL III: CPT | Mod: PBBFAC,,, | Performed by: COLON & RECTAL SURGERY

## 2018-09-20 PROCEDURE — 99024 POSTOP FOLLOW-UP VISIT: CPT | Mod: S$GLB,,, | Performed by: COLON & RECTAL SURGERY

## 2018-09-20 RX ORDER — POLYETHYLENE GLYCOL 3350 17 G/17G
17 POWDER, FOR SOLUTION ORAL DAILY
Qty: 238 G | Refills: 5 | Status: SHIPPED | OUTPATIENT
Start: 2018-09-20 | End: 2019-03-15

## 2018-09-20 NOTE — PROGRESS NOTES
"  CRS Office Followup Visit    SUBJECTIVE:     Chief Complaint: followup from surgery.     Procedure:   8/6/18: lap sigmoid colectomy     Indication: Ms. Henson is a 48-year-old woman who has had intermittent   left-sided abdominal pain associated with intermittent blood in her bowel   movements for the past 2-1/2 years.  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  She   underwent a staging workup and demonstrated no obvious evidence of metastatic   disease.  She was therefore counseled regarding colectomy and lymphadenectomy   for both resection and to complete her staging.    CEA = 5.7      Significant post operative events: uncomplicated.  Discharge on POD#5    Pathology:   Pathologic Staging (pTNM): pT3 pN0 pMX  Number examined: 17  Number involved: 0  Normal MSI testing    Current Status:    She presents today secondary to purulent drainage from the Pfannenstiel incision as well as ongoing abdominal pain. She is not having regular bowel movements.  Her appetite has decreased.    Review of Systems:  Review of Systems   Constitutional: Positive for weight loss. Negative for chills, diaphoresis, fever and malaise/fatigue.   HENT: Negative for congestion.    Respiratory: Negative for shortness of breath.    Cardiovascular: Negative for chest pain and leg swelling.   Gastrointestinal: Negative for abdominal pain, blood in stool, constipation, nausea and vomiting.   Genitourinary: Negative for dysuria.   Musculoskeletal: Negative for back pain and myalgias.   Skin: Negative for rash.   Neurological: Negative for dizziness and weakness.   Endo/Heme/Allergies: Does not bruise/bleed easily.   Psychiatric/Behavioral: Negative for depression.       OBJECTIVE:     Vital Signs (Most Recent)  /77   Pulse (!) 49   Ht 5' 6" (1.676 m)   Wt 86.9 kg (191 lb 9.3 oz)   BMI 30.92 kg/m²     Physical Exam:  General: Black or  female in no " distress   Respiratory: respirations are even and unlabored  Cardiac: regular rate  Abdomen:  Pfannenstiel incision is healing well.  Small amount of purulence evacuated from the left lateral side. No underlying fluid collection.  Her abdomen is otherwise soft and nontender.  Extremities: Warm dry and intact  Anorectal:  Deferred    Imaging: CT from 9/3/18 was reviewed.  No evidence of leak around the anastomosis.  No bowel obstruction.  Contrast moves into the cecum.      ASSESSMENT/PLAN:     Diagnoses and all orders for this visit:    Cancer of sigmoid colon  -     CEA; Future  -     CBC auto differential; Future  -     Comprehensive metabolic panel; Future    Other orders  -     polyethylene glycol (GLYCOLAX) 17 gram/dose powder; Take 17 g by mouth once daily.        48 y.o. female post op from lap sigmoid resection for T3N0 cancer.   She is a stage II cancer.  She has been seen by Oncology in no adjuvant therapy has been offered.  She presents today for ongoing abdominal pain as well as small amount of purulence in the lateral aspect of her incision. Incision appears to be healing well.  Regarding her abdominal pain, this likely related to underlying constipation.  We will trial MiraLax for 2 weeks.  I will get labs today.  If in 2 weeks, she still has abdominal pain, we will proceed with flexible sigmoidoscopy.  For the thickness of her rectovaginal septum, I request has been placed for her to see GYN.  We will follow up with them.      SARAHI Kim MD  Staff Surgeon  Colon & Rectal Surgery

## 2018-09-21 ENCOUNTER — DOCUMENTATION ONLY (OUTPATIENT)
Dept: SURGERY | Facility: CLINIC | Age: 48
End: 2018-09-21

## 2018-09-21 NOTE — PROGRESS NOTES
Patient phoned with results of genetic testing, negative [facility] with [facility]. Her colon cancer appears to be a sporadic cancer. She has a family history of breast cancer and increased screenings with MRI in addition to mmg discussed. She desires to f/u with her GYN for this. Also discussed genetic testing for her sister with early onset breast cancer.

## 2018-09-25 ENCOUNTER — OFFICE VISIT (OUTPATIENT)
Dept: FAMILY MEDICINE | Facility: CLINIC | Age: 48
End: 2018-09-25
Payer: COMMERCIAL

## 2018-09-25 VITALS
DIASTOLIC BLOOD PRESSURE: 74 MMHG | HEIGHT: 66 IN | RESPIRATION RATE: 16 BRPM | BODY MASS INDEX: 30.51 KG/M2 | SYSTOLIC BLOOD PRESSURE: 110 MMHG | HEART RATE: 69 BPM | OXYGEN SATURATION: 98 % | TEMPERATURE: 99 F | WEIGHT: 189.81 LBS

## 2018-09-25 DIAGNOSIS — M25.572 CHRONIC PAIN OF LEFT ANKLE: ICD-10-CM

## 2018-09-25 DIAGNOSIS — G89.29 CHRONIC PAIN OF LEFT ANKLE: ICD-10-CM

## 2018-09-25 DIAGNOSIS — M25.561 ACUTE PAIN OF RIGHT KNEE: Primary | ICD-10-CM

## 2018-09-25 PROCEDURE — 99214 OFFICE O/P EST MOD 30 MIN: CPT | Mod: 25,S$GLB,, | Performed by: PHYSICIAN ASSISTANT

## 2018-09-25 PROCEDURE — 99999 PR PBB SHADOW E&M-EST. PATIENT-LVL IV: CPT | Mod: PBBFAC,,, | Performed by: PHYSICIAN ASSISTANT

## 2018-09-25 PROCEDURE — 96372 THER/PROPH/DIAG INJ SC/IM: CPT | Mod: S$GLB,,, | Performed by: PHYSICIAN ASSISTANT

## 2018-09-25 RX ORDER — NAPROXEN 500 MG/1
500 TABLET ORAL 2 TIMES DAILY
Qty: 30 TABLET | Refills: 0 | Status: SHIPPED | OUTPATIENT
Start: 2018-09-25 | End: 2019-03-15

## 2018-09-25 RX ORDER — KETOROLAC TROMETHAMINE 30 MG/ML
30 INJECTION, SOLUTION INTRAMUSCULAR; INTRAVENOUS ONCE
Status: COMPLETED | OUTPATIENT
Start: 2018-09-25 | End: 2018-09-25

## 2018-09-25 RX ADMIN — KETOROLAC TROMETHAMINE 30 MG: 30 INJECTION, SOLUTION INTRAMUSCULAR; INTRAVENOUS at 03:09

## 2018-09-25 NOTE — PROGRESS NOTES
Subjective:       Patient ID: Leni Henson is a 48 y.o. female.    Chief Complaint: Knee Pain (right knee pain level 8 left ankle pain)    Knee Pain    The incident occurred 2 days ago. There was no injury mechanism. The pain is present in the right knee. The quality of the pain is described as aching. The pain is at a severity of 5/10. The pain has been fluctuating since onset. Associated symptoms include an inability to bear weight, numbness (left foot) and tingling. Nothing aggravates the symptoms. She has tried NSAIDs for the symptoms. The treatment provided no relief.   Ankle Pain    Incident onset: over 1 year. There was no injury mechanism. The pain is present in the left ankle. The quality of the pain is described as aching. The pain is at a severity of 9/10. The pain has been fluctuating since onset. Associated symptoms include an inability to bear weight, numbness (left foot) and tingling. The symptoms are aggravated by movement and weight bearing. She has tried NSAIDs for the symptoms. The treatment provided no relief.     Social History     Socioeconomic History    Marital status: Single     Spouse name: Not on file    Number of children: Not on file    Years of education: Not on file    Highest education level: Not on file   Social Needs    Financial resource strain: Not on file    Food insecurity - worry: Not on file    Food insecurity - inability: Not on file    Transportation needs - medical: Not on file    Transportation needs - non-medical: Not on file   Occupational History    Not on file   Tobacco Use    Smoking status: Never Smoker    Smokeless tobacco: Never Used   Substance and Sexual Activity    Alcohol use: Yes     Comment: social    Drug use: No    Sexual activity: Yes     Partners: Male   Other Topics Concern    Not on file   Social History Narrative    Not on file       Review of Systems   Musculoskeletal: Positive for arthralgias and joint swelling (left ankle).    Neurological: Positive for tingling and numbness (left foot).       Objective:      Physical Exam   Constitutional: She appears well-developed and well-nourished.   HENT:   Head: Normocephalic and atraumatic.   Cardiovascular: Normal rate and regular rhythm.   Musculoskeletal:        Right knee: She exhibits normal range of motion and no swelling. No tenderness found.        Left knee: She exhibits normal range of motion and no swelling. No tenderness found.        Feet:    Vitals reviewed.      Assessment:       1. Acute pain of right knee    2. Chronic pain of left ankle        Plan:         Leni was seen today for knee pain.    Diagnoses and all orders for this visit:    Acute pain of right knee  -     ketorolac injection 30 mg; Inject 1 mL (30 mg total) into the muscle once.  -     naproxen (NAPROSYN) 500 MG tablet; Take 1 tablet (500 mg total) by mouth 2 (two) times daily. Start tomorrow    Chronic pain of left ankle  -     ketorolac injection 30 mg; Inject 1 mL (30 mg total) into the muscle once.  -     naproxen (NAPROSYN) 500 MG tablet; Take 1 tablet (500 mg total) by mouth 2 (two) times daily.  -     Use crutches as needed, ice and elevate

## 2018-09-25 NOTE — PROGRESS NOTES
Patient given toradol 30 mg injection left ventrogluteal, tolerated well, no complaints, no reaction noted

## 2018-09-29 DIAGNOSIS — R19.00 PELVIC MASS: Primary | ICD-10-CM

## 2018-10-01 ENCOUNTER — OFFICE VISIT (OUTPATIENT)
Dept: FAMILY MEDICINE | Facility: CLINIC | Age: 48
End: 2018-10-01
Payer: COMMERCIAL

## 2018-10-01 VITALS
WEIGHT: 194 LBS | HEART RATE: 52 BPM | RESPIRATION RATE: 20 BRPM | DIASTOLIC BLOOD PRESSURE: 70 MMHG | SYSTOLIC BLOOD PRESSURE: 112 MMHG | OXYGEN SATURATION: 99 % | TEMPERATURE: 99 F | BODY MASS INDEX: 31.18 KG/M2 | HEIGHT: 66 IN

## 2018-10-01 DIAGNOSIS — Z01.419 WELL WOMAN EXAM WITH ROUTINE GYNECOLOGICAL EXAM: Primary | ICD-10-CM

## 2018-10-01 LAB
CANDIDA RRNA VAG QL PROBE: NEGATIVE
G VAGINALIS RRNA GENITAL QL PROBE: POSITIVE
T VAGINALIS RRNA GENITAL QL PROBE: NEGATIVE

## 2018-10-01 PROCEDURE — 99396 PREV VISIT EST AGE 40-64: CPT | Mod: S$GLB,,, | Performed by: PHYSICIAN ASSISTANT

## 2018-10-01 PROCEDURE — 99999 PR PBB SHADOW E&M-EST. PATIENT-LVL IV: CPT | Mod: PBBFAC,,, | Performed by: PHYSICIAN ASSISTANT

## 2018-10-01 PROCEDURE — 87591 N.GONORRHOEAE DNA AMP PROB: CPT

## 2018-10-01 PROCEDURE — 87660 TRICHOMONAS VAGIN DIR PROBE: CPT

## 2018-10-01 NOTE — PROGRESS NOTES
Subjective:       Patient ID: Leni Henson is a 48 y.o. female.    Chief Complaint: Well Woman    HPI: 47 yo female presents for well woman exam. Having slight discharge with odor for past week. Had hysterectomy due to tubal pregnancy per patient. No abnormal paps or vaginal cancers.     Review of Systems   Constitutional: Negative for fever.   Genitourinary: Positive for vaginal discharge. Negative for menstrual problem, pelvic pain and vaginal bleeding.       Objective:      Physical Exam   Constitutional: She is oriented to person, place, and time.   Pulmonary/Chest: She exhibits no tenderness. Right breast exhibits no inverted nipple, no mass and no nipple discharge. Left breast exhibits no inverted nipple, no mass and no nipple discharge.   Genitourinary: Vagina normal. There is no rash or tenderness on the right labia. There is no rash or tenderness on the left labia. Right adnexum displays no mass and no tenderness. Left adnexum displays no mass and no tenderness.   Genitourinary Comments: Uterus absent   Neurological: She is alert and oriented to person, place, and time.   Skin: Skin is warm and dry.       Assessment:       1. Well woman exam with routine gynecological exam        Plan:         Leni was seen today for well woman.    Diagnoses and all orders for this visit:    Well woman exam with routine gynecological exam  -     Vaginosis Screen by DNA Probe  -     C. trachomatis/N. gonorrhoeae by AMP DNA

## 2018-10-02 ENCOUNTER — TELEPHONE (OUTPATIENT)
Dept: FAMILY MEDICINE | Facility: CLINIC | Age: 48
End: 2018-10-02

## 2018-10-02 DIAGNOSIS — B96.89 BV (BACTERIAL VAGINOSIS): Primary | ICD-10-CM

## 2018-10-02 DIAGNOSIS — N76.0 BV (BACTERIAL VAGINOSIS): Primary | ICD-10-CM

## 2018-10-02 LAB
C TRACH DNA SPEC QL NAA+PROBE: NOT DETECTED
N GONORRHOEA DNA SPEC QL NAA+PROBE: NOT DETECTED

## 2018-10-02 RX ORDER — METRONIDAZOLE 500 MG/1
500 TABLET ORAL EVERY 12 HOURS
Qty: 14 TABLET | Refills: 0 | Status: SHIPPED | OUTPATIENT
Start: 2018-10-02 | End: 2018-10-09

## 2018-10-03 ENCOUNTER — HOSPITAL ENCOUNTER (OUTPATIENT)
Facility: HOSPITAL | Age: 48
Discharge: HOME | End: 2018-10-03
Attending: COLON & RECTAL SURGERY | Admitting: COLON & RECTAL SURGERY
Payer: COMMERCIAL

## 2018-10-03 ENCOUNTER — ANESTHESIA EVENT (OUTPATIENT)
Dept: ENDOSCOPY | Facility: HOSPITAL | Age: 48
End: 2018-10-03
Payer: COMMERCIAL

## 2018-10-03 ENCOUNTER — ANESTHESIA (OUTPATIENT)
Dept: ENDOSCOPY | Facility: HOSPITAL | Age: 48
End: 2018-10-03
Payer: COMMERCIAL

## 2018-10-03 VITALS
RESPIRATION RATE: 17 BRPM | HEART RATE: 48 BPM | HEIGHT: 66 IN | OXYGEN SATURATION: 100 % | BODY MASS INDEX: 29.25 KG/M2 | DIASTOLIC BLOOD PRESSURE: 61 MMHG | SYSTOLIC BLOOD PRESSURE: 109 MMHG | TEMPERATURE: 98 F | WEIGHT: 182 LBS

## 2018-10-03 DIAGNOSIS — Z12.11 SCREENING FOR COLON CANCER: ICD-10-CM

## 2018-10-03 DIAGNOSIS — Z85.038 HISTORY OF COLON CANCER, STAGE II: Primary | ICD-10-CM

## 2018-10-03 PROCEDURE — 45380 COLONOSCOPY AND BIOPSY: CPT | Mod: 33,,, | Performed by: COLON & RECTAL SURGERY

## 2018-10-03 PROCEDURE — 88305 TISSUE EXAM BY PATHOLOGIST: CPT | Mod: 26,,, | Performed by: PATHOLOGY

## 2018-10-03 PROCEDURE — 63600175 PHARM REV CODE 636 W HCPCS: Performed by: NURSE ANESTHETIST, CERTIFIED REGISTERED

## 2018-10-03 PROCEDURE — 37000008 HC ANESTHESIA 1ST 15 MINUTES: Performed by: COLON & RECTAL SURGERY

## 2018-10-03 PROCEDURE — 45380 COLONOSCOPY AND BIOPSY: CPT | Performed by: COLON & RECTAL SURGERY

## 2018-10-03 PROCEDURE — 25000003 PHARM REV CODE 250: Performed by: NURSE ANESTHETIST, CERTIFIED REGISTERED

## 2018-10-03 PROCEDURE — 37000009 HC ANESTHESIA EA ADD 15 MINS: Performed by: COLON & RECTAL SURGERY

## 2018-10-03 PROCEDURE — 27201012 HC FORCEPS, HOT/COLD, DISP: Performed by: COLON & RECTAL SURGERY

## 2018-10-03 PROCEDURE — E9220 PRA ENDO ANESTHESIA: HCPCS | Mod: ,,, | Performed by: NURSE ANESTHETIST, CERTIFIED REGISTERED

## 2018-10-03 PROCEDURE — 88305 TISSUE EXAM BY PATHOLOGIST: CPT | Performed by: PATHOLOGY

## 2018-10-03 PROCEDURE — 25000003 PHARM REV CODE 250: Performed by: NURSE PRACTITIONER

## 2018-10-03 RX ORDER — PROPOFOL 10 MG/ML
VIAL (ML) INTRAVENOUS CONTINUOUS PRN
Status: DISCONTINUED | OUTPATIENT
Start: 2018-10-03 | End: 2018-10-03

## 2018-10-03 RX ORDER — PROPOFOL 10 MG/ML
VIAL (ML) INTRAVENOUS
Status: DISCONTINUED | OUTPATIENT
Start: 2018-10-03 | End: 2018-10-03

## 2018-10-03 RX ORDER — SODIUM CHLORIDE 0.9 % (FLUSH) 0.9 %
3 SYRINGE (ML) INJECTION
Status: DISCONTINUED | OUTPATIENT
Start: 2018-10-03 | End: 2018-10-03 | Stop reason: HOSPADM

## 2018-10-03 RX ORDER — LIDOCAINE HYDROCHLORIDE 10 MG/ML
INJECTION, SOLUTION INTRAVENOUS
Status: DISCONTINUED | OUTPATIENT
Start: 2018-10-03 | End: 2018-10-03

## 2018-10-03 RX ORDER — SODIUM CHLORIDE 9 MG/ML
INJECTION, SOLUTION INTRAVENOUS CONTINUOUS
Status: DISCONTINUED | OUTPATIENT
Start: 2018-10-03 | End: 2018-10-03 | Stop reason: HOSPADM

## 2018-10-03 RX ADMIN — SODIUM CHLORIDE: 0.9 INJECTION, SOLUTION INTRAVENOUS at 08:10

## 2018-10-03 RX ADMIN — LIDOCAINE HYDROCHLORIDE 50 MG: 10 INJECTION, SOLUTION INTRAVENOUS at 09:10

## 2018-10-03 RX ADMIN — PROPOFOL 50 MG: 10 INJECTION, EMULSION INTRAVENOUS at 09:10

## 2018-10-03 RX ADMIN — PROPOFOL 100 MCG/KG/MIN: 10 INJECTION, EMULSION INTRAVENOUS at 09:10

## 2018-10-03 NOTE — DISCHARGE INSTRUCTIONS
Colonoscopy     A camera attached to a flexible tube with a viewing lens is used to take video pictures.     Colonoscopy is a test to view the inside of your lower digestive tract (colon and rectum). Sometimes it can show the last part of the small intestine (ileum). During the test, small pieces of tissue may be removed for testing. This is called a biopsy. Small growths, such as polyps, may also be removed.   Why is colonoscopy done?  The test is done to help look for colon cancer. And it can help find the source of abdominal pain, bleeding, and changes in bowel habits. It may be needed once a year, depending on factors such as your:  · Age  · Health history  · Family health history  · Symptoms  · Results from any prior colonoscopy  Risks and possible complications  These include:  · Bleeding               · A puncture or tear in the colon   · Risks of anesthesia  · A cancer lesion not being seen  Getting ready   To prepare for the test:  · Talk with your healthcare provider about the risks of the test (see below). Also ask your healthcare provider about alternatives to the test.  · Tell your healthcare provider about any medicines you take. Also tell him or her about any health conditions you may have.  · Make sure your rectum and colon are empty for the test. Follow the diet and bowel prep instructions exactly. If you dont, the test may need to be rescheduled.  · Plan for a friend or family member to drive you home after the test.     Colonoscopy provides an inside view of the entire colon.     You may discuss the results with your doctor right away or at a future visit.  During the test   The test is usually done in the hospital on an outpatient basis. This means you go home the same day. The procedure takes about 30 minutes. During that time:  · You are given relaxing (sedating) medicine through an IV line. You may be drowsy, or fully asleep.  · The healthcare provider will first give you a physical exam to  check for anal and rectal problems.  · Then the anus is lubricated and the scope inserted.  · If you are awake, you may have a feeling similar to needing to have a bowel movement. You may also feel pressure as air is pumped into the colon. Its OK to pass gas during the procedure.  · Biopsy, polyp removal, or other treatments may be done during the test.  After the test   You may have gas right after the test. It can help to try to pass it to help prevent later bloating. Your healthcare provider may discuss the results with you right away. Or you may need to schedule a follow-up visit to talk about the results. After the test, you can go back to your normal eating and other activities. You may be tired from the sedation and need to rest for a few hours.  Date Last Reviewed: 11/1/2016 © 2000-2017 [employer]. [address] [place] [address], [place] [address]. All rights reserved. This information is not intended as a substitute for professional medical care. Always follow your healthcare professional's instructions.

## 2018-10-03 NOTE — H&P
" Endoscopy H&P    Procedure : Colonoscopy      Personal and family history of clon cancer, with recent lap sigmoidectomy in July for pT3 pN0, and brother with history of colon cancer in 40.          Past Medical History:   Diagnosis Date    Malignant neoplasm of splenic flexure 7/27/2018         Review of patient's allergies indicates:   Allergen Reactions    Adhesive Other (See Comments)     "burn"         No current facility-administered medications on file prior to encounter.      Current Outpatient Medications on File Prior to Encounter   Medication Sig Dispense Refill    cetirizine (ZYRTEC) 10 MG tablet Take 1 tablet (10 mg total) by mouth once daily. 30 tablet 0    fluticasone (FLONASE) 50 mcg/actuation nasal spray 1 spray (50 mcg total) by Each Nare route 2 (two) times daily as needed. 15 g 2    HYDROcodone-acetaminophen (NORCO) 5-325 mg per tablet Take 1 tablet by mouth every 4 (four) hours as needed for Pain. 30 tablet 0         Review of Systems -ROS:  GENERAL: No fever, chills, fatigability or weight loss.  CHEST: Denies JIMENEZ, cyanosis, wheezing, cough and sputum production.  CARDIOVASCULAR: Denies chest pain, PND, orthopnea or reduced exercise tolerance.   Musculoskeletal ROS: negative for - gait disturbance or joint pain  Neurological ROS: negative for - confusion or memory loss        Physical Exam:  General: well developed, well nourished, no distress  Head: normocephalic  Neck: supple, symmetrical, trachea midline  Lungs:  clear to auscultation bilaterally and normal respiratory effort  Heart: regular rate and rhythm, S1, S2 normal, no murmur, rub or gallop and regular rate and rhythm  Abdomen: soft, non-tender non-distented; bowel sounds normal; no masses,  no organomegaly  Extremities: no cyanosis or edema, or clubbing       Moderate Sedation (choice): Mallampati Score per anesthesia    ASA : II    IMP: Personal and family history of clon cancer, with recent lap sigmoidectomy in July for pT3 " pN0, and brother with history of colon cancer in 40.    Plan: Colonoscopy with Moderate sedation.  I have explained the procedure including indications, alternatives, expected outcomes and potential complications. The patient appears to understand and gives informed consent. The patient is medically ready for surgery.      Tammy Huffman MD

## 2018-10-03 NOTE — ANESTHESIA POSTPROCEDURE EVALUATION
"Anesthesia Post Evaluation    Patient: Leni Henson    Procedure(s) Performed: Procedure(s) (LRB):  COLONOSCOPY with Kim (N/A)    Final Anesthesia Type: general  Patient location during evaluation: PACU  Patient participation: Yes- Able to Participate  Level of consciousness: awake and alert and oriented  Pain management: adequate  Airway patency: patent  PONV status at discharge: No PONV  Anesthetic complications: no      Cardiovascular status: blood pressure returned to baseline and hemodynamically stable  Respiratory status: unassisted  Hydration status: euvolemic  Follow-up not needed.        Visit Vitals  BP (!) 105/59 (BP Location: Left arm)   Pulse (!) 55   Temp 36.6 °C (97.9 °F) (Temporal)   Resp 16   Ht 5' 6" (1.676 m)   Wt 82.6 kg (182 lb)   SpO2 100%   Breastfeeding? No   BMI 29.38 kg/m²       Pain/Arron Score: Pain Assessment Performed: Yes (10/3/2018  8:40 AM)  Presence of Pain: denies (10/3/2018  8:40 AM)  Pain Rating Prior to Med Admin: 0 (10/3/2018  8:40 AM)  Arron Score: 9 (10/3/2018  9:40 AM)        "

## 2018-10-03 NOTE — [employer] PATIENT INSTRUCTIONS
Discharge Summary/Instructions after an Endoscopic Procedure  Patient Name: Leni Henson  Patient MRN: [id number]  Patient YOB: 1970 Wednesday, October 03, 2018  Thomas Kim MD  RESTRICTIONS:  During your procedure today, you received medications for sedation.  These   medications may affect your judgment, balance and coordination.  Therefore,   for 24 hours, you have the following restrictions:   - DO NOT drive a car, operate machinery, make legal/financial decisions,   sign important papers or drink alcohol.    ACTIVITY:  Today: no heavy lifting, straining or running due to procedural   sedation/anesthesia.  The following day: return to full activity including work.  DIET:  Eat and drink normally unless instructed otherwise.     TREATMENT FOR COMMON SIDE EFFECTS:  - Mild abdominal pain, nausea, belching, bloating or excessive gas:  rest,   eat lightly and use a heating pad.  - Sore Throat: treat with throat lozenges and/or gargle with warm salt   water.  - Because air was used during the procedure, expelling large amounts of air   from your rectum or belching is normal.  - If a bowel prep was taken, you may not have a bowel movement for 1-3 days.    This is normal.  SYMPTOMS TO WATCH FOR AND REPORT TO YOUR PHYSICIAN:  1. Abdominal pain or bloating, other than gas cramps.  2. Chest pain.  3. Back pain.  4. Signs of infection such as: chills or fever occurring within 24 hours   after the procedure.  5. Rectal bleeding, which would show as bright red, maroon, or black stools.   (A tablespoon of blood from the rectum is not serious, especially if   hemorrhoids are present.)  6. Vomiting.  7. Weakness or dizziness.  GO DIRECTLY TO THE NEAREST EMERGENCY ROOM IF YOU HAVE ANY OF THE FOLLOWING:      Difficulty breathing              Chills and/or fever over 101 F   Persistent vomiting and/or vomiting blood   Severe abdominal pain   Severe chest pain   Black, tarry stools   Bleeding- more than one  tablespoon   Any other symptom or condition that you feel may need urgent attention  Your doctor recommends these additional instructions:  If any biopsies were taken, your doctors clinic will contact you in 1 to 2   weeks with any results.  - Discharge patient to home (ambulatory).   - Resume previous diet.   - Continue present medications.   - Await pathology results.   - Repeat colonoscopy in 3 years for surveillance based on personal history   of colon cancer.  For questions, problems or results please call your physician - Thomas Kim MD at Work:  [phone].  [facility], EMERGENCY ROOM PHONE NUMBER: [phone]  IF A COMPLICATION OR EMERGENCY SITUATION ARISES AND YOU ARE UNABLE TO REACH   YOUR PHYSICIAN - GO DIRECTLY TO THE EMERGENCY ROOM.  Thomas Kim MD  10/3/2018 9:37:25 AM  This report has been verified and signed electronically.  [employer]

## 2018-10-03 NOTE — TRANSFER OF CARE
"Anesthesia Transfer of Care Note    Patient: Leni Henson    Procedure(s) Performed: Procedure(s) (LRB):  COLONOSCOPY with Julio (N/A)    Patient location: PACU    Anesthesia Type: general    Transport from OR: Transported from OR on room air with adequate spontaneous ventilation    Post pain: adequate analgesia    Post assessment: no apparent anesthetic complications    Post vital signs: stable    Level of consciousness: awake, alert and oriented    Nausea/Vomiting: no nausea/vomiting    Complications: none    Transfer of care protocol was followed      Last vitals:   Visit Vitals  BP (!) 107/55 (BP Location: Left arm, Patient Position: Lying)   Pulse (!) 50   Temp 36.9 °C (98.4 °F) (Temporal)   Resp 16   Ht 5' 6" (1.676 m)   Wt 82.6 kg (182 lb)   SpO2 100%   Breastfeeding? No   BMI 29.38 kg/m²     "

## 2018-10-03 NOTE — ANESTHESIA PREPROCEDURE EVALUATION
10/03/2018  Leni Henson is a 48 y.o., female.    Anesthesia Evaluation    I have reviewed the Patient Summary Reports.    I have reviewed the Nursing Notes.   I have reviewed the Medications.     Review of Systems  Anesthesia Hx:  No problems with previous Anesthesia  History of prior surgery of interest to airway management or planning: Previous anesthesia: General Airway issues documented on chart review include mask, easy, easy direct laryngoscopy , view on direct laryngoscopy Grade I    Social:  Non-Smoker    Hematology/Oncology:         -- Cancer in past history: surgery    Musculoskeletal:  Musculoskeletal Normal    Neurological:  Neurology Normal    Dermatological:  Skin Normal    Psych:  Psychiatric Normal           Physical Exam  General:  Well nourished                 Anesthesia Plan  Type of Anesthesia, risks & benefits discussed:  Anesthesia Type:  general  Patient's Preference:   Intra-op Monitoring Plan:   Intra-op Monitoring Plan Comments:   Post Op Pain Control Plan:   Post Op Pain Control Plan Comments:   Induction:   IV  Beta Blocker:  Patient is not currently on a Beta-Blocker (No further documentation required).       Informed Consent: Patient understands risks and agrees with Anesthesia plan.  Questions answered. Anesthesia consent signed with patient.  ASA Score: 2     Day of Surgery Review of History & Physical: I have interviewed and examined the patient. I have reviewed the patient's H&P dated:            Ready For Surgery From Anesthesia Perspective.

## 2018-10-10 ENCOUNTER — TELEPHONE (OUTPATIENT)
Dept: ENDOSCOPY | Facility: HOSPITAL | Age: 48
End: 2018-10-10

## 2019-01-11 ENCOUNTER — TELEPHONE (OUTPATIENT)
Dept: HEMATOLOGY/ONCOLOGY | Facility: CLINIC | Age: 49
End: 2019-01-11

## 2019-01-11 NOTE — TELEPHONE ENCOUNTER
spoke with pt on today in regards to 6 months f/u,  inform pt March schedule is not quite open, will schedule accordingly.

## 2019-03-09 ENCOUNTER — HOSPITAL ENCOUNTER (OUTPATIENT)
Dept: RADIOLOGY | Facility: HOSPITAL | Age: 49
Discharge: HOME | End: 2019-03-09
Attending: INTERNAL MEDICINE

## 2019-03-09 DIAGNOSIS — Z85.038 HISTORY OF COLON CANCER, STAGE II: ICD-10-CM

## 2019-03-09 PROCEDURE — 25500020 PHARM REV CODE 255: Performed by: INTERNAL MEDICINE

## 2019-03-09 PROCEDURE — 71260 CT THORAX DX C+: CPT | Mod: TC

## 2019-03-09 PROCEDURE — 71260 CT THORAX DX C+: CPT | Mod: 26,,, | Performed by: RADIOLOGY

## 2019-03-09 PROCEDURE — 74177 CT ABD & PELVIS W/CONTRAST: CPT | Mod: TC

## 2019-03-09 PROCEDURE — 74177 CT ABDOMEN PELVIS WITH CONTRAST: ICD-10-PCS | Mod: 26,,, | Performed by: RADIOLOGY

## 2019-03-09 PROCEDURE — 71260 CT CHEST WITH CONTRAST: ICD-10-PCS | Mod: 26,,, | Performed by: RADIOLOGY

## 2019-03-09 PROCEDURE — 74177 CT ABD & PELVIS W/CONTRAST: CPT | Mod: 26,,, | Performed by: RADIOLOGY

## 2019-03-09 RX ADMIN — IOHEXOL 15 ML: 350 INJECTION, SOLUTION INTRAVENOUS at 01:03

## 2019-03-09 RX ADMIN — IOHEXOL 100 ML: 350 INJECTION, SOLUTION INTRAVENOUS at 02:03

## 2019-03-09 RX ADMIN — IOHEXOL 15 ML: 350 INJECTION, SOLUTION INTRAVENOUS at 02:03

## 2019-03-15 ENCOUNTER — LAB VISIT (OUTPATIENT)
Dept: LAB | Facility: HOSPITAL | Age: 49
End: 2019-03-15
Attending: INTERNAL MEDICINE

## 2019-03-15 ENCOUNTER — OFFICE VISIT (OUTPATIENT)
Dept: HEMATOLOGY/ONCOLOGY | Facility: CLINIC | Age: 49
End: 2019-03-15

## 2019-03-15 VITALS
DIASTOLIC BLOOD PRESSURE: 74 MMHG | TEMPERATURE: 98 F | SYSTOLIC BLOOD PRESSURE: 126 MMHG | RESPIRATION RATE: 17 BRPM | WEIGHT: 216.94 LBS | HEART RATE: 53 BPM | BODY MASS INDEX: 34.05 KG/M2 | HEIGHT: 67 IN | OXYGEN SATURATION: 100 %

## 2019-03-15 DIAGNOSIS — Z85.038 HISTORY OF COLON CANCER, STAGE II: Primary | ICD-10-CM

## 2019-03-15 DIAGNOSIS — Z85.038 HISTORY OF COLON CANCER, STAGE II: ICD-10-CM

## 2019-03-15 LAB
ALBUMIN SERPL BCP-MCNC: 3.7 G/DL
ALP SERPL-CCNC: 83 U/L
ALT SERPL W/O P-5'-P-CCNC: 13 U/L
ANION GAP SERPL CALC-SCNC: 5 MMOL/L
AST SERPL-CCNC: 16 U/L
BILIRUB SERPL-MCNC: 0.5 MG/DL
BUN SERPL-MCNC: 11 MG/DL
CALCIUM SERPL-MCNC: 9.3 MG/DL
CEA SERPL-MCNC: 2.1 NG/ML
CHLORIDE SERPL-SCNC: 104 MMOL/L
CO2 SERPL-SCNC: 30 MMOL/L
CREAT SERPL-MCNC: 0.8 MG/DL
ERYTHROCYTE [DISTWIDTH] IN BLOOD BY AUTOMATED COUNT: 16.3 %
EST. GFR  (AFRICAN AMERICAN): >60 ML/MIN/1.73 M^2
EST. GFR  (NON AFRICAN AMERICAN): >60 ML/MIN/1.73 M^2
GLUCOSE SERPL-MCNC: 102 MG/DL
HCT VFR BLD AUTO: 36.7 %
HGB BLD-MCNC: 10.9 G/DL
IMM GRANULOCYTES # BLD AUTO: 0.01 K/UL
MCH RBC QN AUTO: 24.7 PG
MCHC RBC AUTO-ENTMCNC: 29.7 G/DL
MCV RBC AUTO: 83 FL
NEUTROPHILS # BLD AUTO: 2.6 K/UL
PLATELET # BLD AUTO: 279 K/UL
PMV BLD AUTO: 10 FL
POTASSIUM SERPL-SCNC: 3.9 MMOL/L
PROT SERPL-MCNC: 7.9 G/DL
RBC # BLD AUTO: 4.42 M/UL
SODIUM SERPL-SCNC: 139 MMOL/L
WBC # BLD AUTO: 5.16 K/UL

## 2019-03-15 PROCEDURE — 82378 CARCINOEMBRYONIC ANTIGEN: CPT

## 2019-03-15 PROCEDURE — 80053 COMPREHEN METABOLIC PANEL: CPT

## 2019-03-15 PROCEDURE — 85027 COMPLETE CBC AUTOMATED: CPT

## 2019-03-15 PROCEDURE — 99999 PR PBB SHADOW E&M-EST. PATIENT-LVL III: CPT | Mod: PBBFAC,,, | Performed by: INTERNAL MEDICINE

## 2019-03-15 PROCEDURE — 36415 COLL VENOUS BLD VENIPUNCTURE: CPT

## 2019-03-15 PROCEDURE — 99213 OFFICE O/P EST LOW 20 MIN: CPT | Mod: S$PBB,,, | Performed by: INTERNAL MEDICINE

## 2019-03-15 PROCEDURE — 99213 PR OFFICE/OUTPT VISIT, EST, LEVL III, 20-29 MIN: ICD-10-PCS | Mod: S$PBB,,, | Performed by: INTERNAL MEDICINE

## 2019-03-15 PROCEDURE — 99999 PR PBB SHADOW E&M-EST. PATIENT-LVL III: ICD-10-PCS | Mod: PBBFAC,,, | Performed by: INTERNAL MEDICINE

## 2019-03-15 PROCEDURE — 99213 OFFICE O/P EST LOW 20 MIN: CPT | Mod: PBBFAC | Performed by: INTERNAL MEDICINE

## 2019-03-15 NOTE — PROGRESS NOTES
"Subjective:       Patient ID: Leni Henson is a 48 y.o. female.    Chief Complaint: history of colon cancer  Ms. Leni Henson is a 48-year-old female who has been having left-sided abdominal pain for about 2-3 years along with occasional episodes of blood in the stool.  She finally sought care and underwent a   colonoscopy on 07/25/2018 and that revealed likely malignant completely obstructing tumor in the sigmoid colon, which was biopsied revealing moderately differentiated colon cancer.  She underwent restaging CT scans and was noted to have a 4 mm nodule in the lung and repeat was recommended at 12 months.  She was then taken for sigmoid colectomy, which she underwent on 08/06/2018 and   Pathology from that revealed a pathologic T3 N0 M0, 17 lymph nodes were examined, which were negative.  It was moderately differentiated, no perineural invasion, no lymphovascular invasion, no localized perforation.  All margins were negative    HPIShe underwent colonoscopy in 10/18 which was normal and a repeat will be done in 3 years. CT scans from 3/9/19 reveal "No evidence of local recurrence in this patient with history of colon cancer status sigmoidectomy with colorectal anastomosis. Stable soft tissue lesion anterior to the left psoas muscle.  This may represent conglomeration of lymph nodes, related to the left adnexa or soft tissue thickening. This may be followed with continued oncologic surveillance. Additional stable findings as above. There are no measurable lesions per RECIST criteria"    Review of Systems   Constitutional: Negative for appetite change, fatigue and unexpected weight change.   HENT: Negative for mouth sores.    Eyes: Negative for visual disturbance.   Respiratory: Negative for cough and shortness of breath.    Cardiovascular: Negative for chest pain.   Gastrointestinal: Negative for abdominal pain and diarrhea.   Genitourinary: Negative for frequency.   Musculoskeletal: Negative for back pain. "   Skin: Negative for rash.   Neurological: Negative for headaches.   Hematological: Negative for adenopathy.   Psychiatric/Behavioral: The patient is not nervous/anxious.    All other systems reviewed and are negative.      Objective:      Physical Exam   Constitutional: She is oriented to person, place, and time. She appears well-developed and well-nourished.   HENT:   Mouth/Throat: No oropharyngeal exudate.   Cardiovascular: Normal rate and normal heart sounds.   Pulmonary/Chest: Effort normal and breath sounds normal. She has no wheezes.   Abdominal: Soft. Bowel sounds are normal. There is no tenderness.   Musculoskeletal: She exhibits no edema or tenderness.   Lymphadenopathy:     She has no cervical adenopathy.   Neurological: She is alert and oriented to person, place, and time. Coordination normal.   Skin: Skin is warm and dry. No rash noted.   Psychiatric: She has a normal mood and affect. Judgment and thought content normal.   Vitals reviewed.      LABS:  WBC   Date Value Ref Range Status   03/15/2019 5.16 3.90 - 12.70 K/uL Final     Hemoglobin   Date Value Ref Range Status   03/15/2019 10.9 (L) 12.0 - 16.0 g/dL Final     Hematocrit   Date Value Ref Range Status   03/15/2019 36.7 (L) 37.0 - 48.5 % Final     Platelets   Date Value Ref Range Status   03/15/2019 279 150 - 350 K/uL Final     Gran # (ANC)   Date Value Ref Range Status   03/15/2019 2.6 1.8 - 7.7 K/uL Final     Comment:     The ANC is based on a white cell differential from an   automated cell counter. It has not been microscopically   reviewed for the presence of abnormal cells. Clinical   correlation is required.            Chemistry        Component Value Date/Time     03/15/2019 0840    K 3.9 03/15/2019 0840     03/15/2019 0840    CO2 30 (H) 03/15/2019 0840    BUN 11 03/15/2019 0840    CREATININE 0.8 03/15/2019 0840     03/15/2019 0840        Component Value Date/Time    CALCIUM 9.3 03/15/2019 0840    ALKPHOS 83 03/15/2019  0840    AST 16 03/15/2019 0840    ALT 13 03/15/2019 0840    BILITOT 0.5 03/15/2019 0840    ESTGFRAFRICA >60.0 03/15/2019 0840    EGFRNONAA >60.0 03/15/2019 0840      '  Assessment:       1. History of colon cancer, stage II        Plan:        1. She is doing well clinically and has no evidence of recurrence and will return in 6 months with l;abs and CT scans    Above care plan was discussed with patient  and all questions were addressed to her satisfaction

## 2019-09-27 ENCOUNTER — TELEPHONE (OUTPATIENT)
Dept: HEMATOLOGY/ONCOLOGY | Facility: CLINIC | Age: 49
End: 2019-09-27

## 2019-09-27 NOTE — TELEPHONE ENCOUNTER
spoke with pt on today in regards to rescheduling missed ct-scans, pt is aware and has confirm new appointments.

## 2019-10-03 ENCOUNTER — HOSPITAL ENCOUNTER (OUTPATIENT)
Dept: RADIOLOGY | Facility: HOSPITAL | Age: 49
Discharge: HOME | End: 2019-10-03
Attending: INTERNAL MEDICINE
Payer: MEDICAID

## 2019-10-03 DIAGNOSIS — Z85.038 HISTORY OF COLON CANCER, STAGE II: ICD-10-CM

## 2019-10-03 PROCEDURE — 74177 CT ABD & PELVIS W/CONTRAST: CPT | Mod: TC

## 2019-10-03 PROCEDURE — 71260 CT THORAX DX C+: CPT | Mod: 26,,, | Performed by: RADIOLOGY

## 2019-10-03 PROCEDURE — 25500020 PHARM REV CODE 255: Performed by: INTERNAL MEDICINE

## 2019-10-03 PROCEDURE — 74177 CT ABD & PELVIS W/CONTRAST: CPT | Mod: 26,,, | Performed by: RADIOLOGY

## 2019-10-03 PROCEDURE — 74177 CT CHEST ABDOMEN PELVIS WITH CONTRAST (XPD): ICD-10-PCS | Mod: 26,,, | Performed by: RADIOLOGY

## 2019-10-03 PROCEDURE — 71260 CT CHEST ABDOMEN PELVIS WITH CONTRAST (XPD): ICD-10-PCS | Mod: 26,,, | Performed by: RADIOLOGY

## 2019-10-03 RX ADMIN — IOHEXOL 15 ML: 350 INJECTION, SOLUTION INTRAVENOUS at 03:10

## 2019-10-03 RX ADMIN — IOHEXOL 100 ML: 350 INJECTION, SOLUTION INTRAVENOUS at 06:10

## 2019-10-04 ENCOUNTER — OFFICE VISIT (OUTPATIENT)
Dept: HEMATOLOGY/ONCOLOGY | Facility: CLINIC | Age: 49
End: 2019-10-04
Payer: MEDICAID

## 2019-10-04 ENCOUNTER — LAB VISIT (OUTPATIENT)
Dept: LAB | Facility: HOSPITAL | Age: 49
End: 2019-10-04
Attending: INTERNAL MEDICINE
Payer: MEDICAID

## 2019-10-04 VITALS
DIASTOLIC BLOOD PRESSURE: 69 MMHG | TEMPERATURE: 99 F | HEIGHT: 67 IN | BODY MASS INDEX: 35.71 KG/M2 | WEIGHT: 227.5 LBS | RESPIRATION RATE: 18 BRPM | HEART RATE: 61 BPM | OXYGEN SATURATION: 98 % | SYSTOLIC BLOOD PRESSURE: 119 MMHG

## 2019-10-04 DIAGNOSIS — Z85.038 HISTORY OF COLON CANCER, STAGE II: Primary | ICD-10-CM

## 2019-10-04 DIAGNOSIS — Z85.038 HISTORY OF COLON CANCER, STAGE II: ICD-10-CM

## 2019-10-04 LAB
ALBUMIN SERPL BCP-MCNC: 3.4 G/DL (ref 3.5–5.2)
ALP SERPL-CCNC: 93 U/L (ref 55–135)
ALT SERPL W/O P-5'-P-CCNC: 36 U/L (ref 10–44)
ANION GAP SERPL CALC-SCNC: 7 MMOL/L (ref 8–16)
AST SERPL-CCNC: 21 U/L (ref 10–40)
BILIRUB SERPL-MCNC: 0.3 MG/DL (ref 0.1–1)
BUN SERPL-MCNC: 7 MG/DL (ref 6–20)
CALCIUM SERPL-MCNC: 9.3 MG/DL (ref 8.7–10.5)
CEA SERPL-MCNC: 1.8 NG/ML (ref 0–5)
CHLORIDE SERPL-SCNC: 104 MMOL/L (ref 95–110)
CO2 SERPL-SCNC: 30 MMOL/L (ref 23–29)
CREAT SERPL-MCNC: 0.8 MG/DL (ref 0.5–1.4)
ERYTHROCYTE [DISTWIDTH] IN BLOOD BY AUTOMATED COUNT: 14 % (ref 11.5–14.5)
EST. GFR  (AFRICAN AMERICAN): >60 ML/MIN/1.73 M^2
EST. GFR  (NON AFRICAN AMERICAN): >60 ML/MIN/1.73 M^2
GLUCOSE SERPL-MCNC: 94 MG/DL (ref 70–110)
HCT VFR BLD AUTO: 39 % (ref 37–48.5)
HGB BLD-MCNC: 11.9 G/DL (ref 12–16)
IMM GRANULOCYTES # BLD AUTO: 0 K/UL (ref 0–0.04)
MCH RBC QN AUTO: 27.2 PG (ref 27–31)
MCHC RBC AUTO-ENTMCNC: 30.5 G/DL (ref 32–36)
MCV RBC AUTO: 89 FL (ref 82–98)
NEUTROPHILS # BLD AUTO: 4 K/UL (ref 1.8–7.7)
PLATELET # BLD AUTO: 309 K/UL (ref 150–350)
PMV BLD AUTO: 9.8 FL (ref 9.2–12.9)
POTASSIUM SERPL-SCNC: 3.7 MMOL/L (ref 3.5–5.1)
PROT SERPL-MCNC: 7.8 G/DL (ref 6–8.4)
RBC # BLD AUTO: 4.37 M/UL (ref 4–5.4)
SODIUM SERPL-SCNC: 141 MMOL/L (ref 136–145)
WBC # BLD AUTO: 6.64 K/UL (ref 3.9–12.7)

## 2019-10-04 PROCEDURE — 80053 COMPREHEN METABOLIC PANEL: CPT

## 2019-10-04 PROCEDURE — 99214 OFFICE O/P EST MOD 30 MIN: CPT | Mod: S$PBB,,, | Performed by: INTERNAL MEDICINE

## 2019-10-04 PROCEDURE — 85027 COMPLETE CBC AUTOMATED: CPT

## 2019-10-04 PROCEDURE — 99213 OFFICE O/P EST LOW 20 MIN: CPT | Mod: PBBFAC | Performed by: INTERNAL MEDICINE

## 2019-10-04 PROCEDURE — 99999 PR PBB SHADOW E&M-EST. PATIENT-LVL III: ICD-10-PCS | Mod: PBBFAC,,, | Performed by: INTERNAL MEDICINE

## 2019-10-04 PROCEDURE — 99214 PR OFFICE/OUTPT VISIT, EST, LEVL IV, 30-39 MIN: ICD-10-PCS | Mod: S$PBB,,, | Performed by: INTERNAL MEDICINE

## 2019-10-04 PROCEDURE — 36415 COLL VENOUS BLD VENIPUNCTURE: CPT

## 2019-10-04 PROCEDURE — 82378 CARCINOEMBRYONIC ANTIGEN: CPT

## 2019-10-04 PROCEDURE — 99999 PR PBB SHADOW E&M-EST. PATIENT-LVL III: CPT | Mod: PBBFAC,,, | Performed by: INTERNAL MEDICINE

## 2019-10-04 NOTE — PROGRESS NOTES
"Subjective:       Patient ID: Leni Henson is a 49 y.o. female.    Chief Complaint: History of colon cancer, stage II  Ms. Leni Henson is a 48-year-old female who has been having left-sided abdominal pain for about 2-3 years along with occasional episodes of blood in the stool.  She finally sought care and underwent a   colonoscopy on 07/25/2018 and that revealed likely malignant completely obstructing tumor in the sigmoid colon, which was biopsied revealing moderately differentiated colon cancer.  She underwent restaging CT scans and was noted to have a 4 mm nodule in the lung and repeat was recommended at 12 months.  She was then taken for sigmoid colectomy, which she underwent on 08/06/2018 and   Pathology from that revealed a pathologic T3 N0 M0, 17 lymph nodes were examined, which were negative.  It was moderately differentiated, no perineural invasion, no lymphovascular invasion, no localized perforation.  All margins were negative     She underwent colonoscopy in 10/18 which was normal and a repeat will be done in 3 years    HPIShe comes in to review her CT scans from 10/3/19 which reveal "No findings to indicate metastatic disease.  There is some soft tissue density in the vagina perhaps residual cervix.  Correlation with physical exam may be helpful. Soft tissue anterior to the left psoas measures 3.7 x 1.0 cm today.  As previously stated this may be related to the left ovary, though adenopathy and soft tissue mass not excluded.  Serial follow-up suggested. There are no measurable lesions per RECIST criteria"  She is doing well and denies any new issues    Review of Systems   Constitutional: Negative for appetite change, fatigue and unexpected weight change.   HENT: Negative for mouth sores.    Eyes: Negative for visual disturbance.   Respiratory: Negative for cough and shortness of breath.    Cardiovascular: Negative for chest pain.   Gastrointestinal: Negative for abdominal pain and diarrhea. "   Genitourinary: Negative for frequency.   Musculoskeletal: Negative for back pain.   Skin: Negative for rash.   Neurological: Negative for headaches.   Hematological: Negative for adenopathy.   Psychiatric/Behavioral: The patient is not nervous/anxious.    All other systems reviewed and are negative.      Objective:      Physical Exam   Constitutional: She is oriented to person, place, and time. She appears well-developed and well-nourished.   HENT:   Mouth/Throat: No oropharyngeal exudate.   Cardiovascular: Normal rate and normal heart sounds.   Pulmonary/Chest: Effort normal and breath sounds normal. She has no wheezes.   Abdominal: Soft. Bowel sounds are normal. There is no tenderness.   Musculoskeletal: She exhibits no edema or tenderness.   Lymphadenopathy:     She has no cervical adenopathy.   Neurological: She is alert and oriented to person, place, and time. Coordination normal.   Skin: Skin is warm and dry. No rash noted.   Psychiatric: She has a normal mood and affect. Judgment and thought content normal.   Vitals reviewed.        LABS:  WBC   Date Value Ref Range Status   10/04/2019 6.64 3.90 - 12.70 K/uL Final     Hemoglobin   Date Value Ref Range Status   10/04/2019 11.9 (L) 12.0 - 16.0 g/dL Final     Hematocrit   Date Value Ref Range Status   10/04/2019 39.0 37.0 - 48.5 % Final     Platelets   Date Value Ref Range Status   10/04/2019 309 150 - 350 K/uL Final     Gran # (ANC)   Date Value Ref Range Status   10/04/2019 4.0 1.8 - 7.7 K/uL Final     Comment:     The ANC is based on a white cell differential from an   automated cell counter. It has not been microscopically   reviewed for the presence of abnormal cells. Clinical   correlation is required.         Chemistry        Component Value Date/Time     10/04/2019 1459    K 3.7 10/04/2019 1459     10/04/2019 1459    CO2 30 (H) 10/04/2019 1459    BUN 7 10/04/2019 1459    CREATININE 0.8 10/04/2019 1459    GLU 94 10/04/2019 1459         Component Value Date/Time    CALCIUM 9.3 10/04/2019 1459    ALKPHOS 93 10/04/2019 1459    AST 21 10/04/2019 1459    ALT 36 10/04/2019 1459    BILITOT 0.3 10/04/2019 1459    ESTGFRAFRICA >60.0 10/04/2019 1459    EGFRNONAA >60.0 10/04/2019 1459        CEA:     Assessment:       1. History of colon cancer, stage II        Plan:         1. She is doing well with no evidence of recurrence and will return in 6 months with labs and CT scans  She will see her gynecologist to assess the soft tissue density near the vagina.    Above care plan was discussed with patient and all questions were addressed to his satisfaction

## 2019-10-27 ENCOUNTER — OFFICE VISIT (OUTPATIENT)
Dept: URGENT CARE | Facility: CLINIC | Age: 49
End: 2019-10-27
Payer: MEDICAID

## 2019-10-27 VITALS
HEIGHT: 67 IN | HEART RATE: 73 BPM | SYSTOLIC BLOOD PRESSURE: 106 MMHG | OXYGEN SATURATION: 99 % | DIASTOLIC BLOOD PRESSURE: 63 MMHG | RESPIRATION RATE: 18 BRPM | BODY MASS INDEX: 35.63 KG/M2 | WEIGHT: 227 LBS | TEMPERATURE: 99 F

## 2019-10-27 DIAGNOSIS — B37.31 YEAST VAGINITIS: Primary | ICD-10-CM

## 2019-10-27 DIAGNOSIS — N89.8 VAGINAL DISCHARGE: ICD-10-CM

## 2019-10-27 DIAGNOSIS — N39.0 BACTERIAL UTI: ICD-10-CM

## 2019-10-27 DIAGNOSIS — A49.9 BACTERIAL UTI: ICD-10-CM

## 2019-10-27 DIAGNOSIS — R30.0 DYSURIA: ICD-10-CM

## 2019-10-27 LAB
BILIRUB UR QL STRIP: POSITIVE
GLUCOSE UR QL STRIP: NEGATIVE
KETONES UR QL STRIP: POSITIVE
LEUKOCYTE ESTERASE UR QL STRIP: POSITIVE
PH, POC UA: 6 (ref 5–8)
POC BLOOD, URINE: POSITIVE
POC NITRATES, URINE: POSITIVE
PROT UR QL STRIP: NEGATIVE
SP GR UR STRIP: 1.02 (ref 1–1.03)
UROBILINOGEN UR STRIP-ACNC: 4 (ref 0.1–1.1)

## 2019-10-27 PROCEDURE — 81003 URINALYSIS AUTO W/O SCOPE: CPT | Mod: QW,S$GLB,, | Performed by: FAMILY MEDICINE

## 2019-10-27 PROCEDURE — 81003 POCT URINALYSIS, DIPSTICK, AUTOMATED, W/O SCOPE: ICD-10-PCS | Mod: QW,S$GLB,, | Performed by: FAMILY MEDICINE

## 2019-10-27 PROCEDURE — 87186 SC STD MICRODIL/AGAR DIL: CPT

## 2019-10-27 PROCEDURE — 99213 PR OFFICE/OUTPT VISIT, EST, LEVL III, 20-29 MIN: ICD-10-PCS | Mod: S$GLB,,, | Performed by: FAMILY MEDICINE

## 2019-10-27 PROCEDURE — 87088 URINE BACTERIA CULTURE: CPT

## 2019-10-27 PROCEDURE — 87077 CULTURE AEROBIC IDENTIFY: CPT

## 2019-10-27 PROCEDURE — 87086 URINE CULTURE/COLONY COUNT: CPT

## 2019-10-27 PROCEDURE — 99213 OFFICE O/P EST LOW 20 MIN: CPT | Mod: S$GLB,,, | Performed by: FAMILY MEDICINE

## 2019-10-27 RX ORDER — FLUCONAZOLE 150 MG/1
150 TABLET ORAL DAILY
Qty: 2 TABLET | Refills: 0 | Status: SHIPPED | OUTPATIENT
Start: 2019-10-27 | End: 2019-10-29

## 2019-10-27 RX ORDER — AMOXICILLIN 875 MG/1
875 TABLET, FILM COATED ORAL EVERY 12 HOURS
Qty: 14 TABLET | Refills: 0 | Status: SHIPPED | OUTPATIENT
Start: 2019-10-27 | End: 2019-11-03

## 2019-10-27 NOTE — PROGRESS NOTES
"Subjective:       Patient ID: Leni Henson is a 49 y.o. female.    Vitals:  height is 5' 7.3" (1.709 m) and weight is 103 kg (227 lb). Her temperature is 98.6 °F (37 °C). Her blood pressure is 106/63 and her pulse is 73. Her respiration is 18 and oxygen saturation is 99%.     Chief Complaint: Vaginal Discharge    49 yr old pleasant female with no significant medical history presents today for urgent care c/o dysuria and yeast vaginal discharge. No foul smell discharge. No abdominal or flank pain or fever. Details as follows -    Vaginal Discharge   The patient's primary symptoms include vaginal discharge. The patient's pertinent negatives include no missed menses or pelvic pain. This is a new problem. The current episode started in the past 7 days. The problem occurs constantly. The problem has been unchanged. The pain is mild. She is not pregnant. Associated symptoms include back pain and dysuria. Pertinent negatives include no abdominal pain or fever. The vaginal discharge was white. There has been no bleeding. She has not been passing clots. She has not been passing tissue. She has tried nothing for the symptoms. She is not sexually active. She uses hysterectomy for contraception. She is postmenopausal. There is no history of ovarian cysts.       Constitution: Negative for fever.   Neck: Negative for painful lymph nodes.   Gastrointestinal: Negative for abdominal pain.   Genitourinary: Positive for dysuria and vaginal discharge. Negative for urine decreased, history of kidney stones, painful menstruation, irregular menstruation, missed menses, heavy menstrual bleeding, ovarian cysts, genital trauma, vaginal pain, vaginal bleeding, vaginal odor, painful intercourse, genital sore, painful ejaculation and pelvic pain.   Musculoskeletal: Positive for back pain.   Skin: Negative for lesion.   Hematologic/Lymphatic: Negative for swollen lymph nodes.       PMH/PSH/FH/SH/MED/ALLERGY reviewed    Objective:     "   Vitals:    10/27/19 1752   BP: 106/63   Pulse: 73   Resp: 18   Temp: 98.6 °F (37 °C)       Physical Exam   Constitutional: She is oriented to person, place, and time. She appears well-developed and well-nourished. She is cooperative.  Non-toxic appearance. She does not appear ill. No distress.   HENT:   Head: Normocephalic and atraumatic.   Right Ear: Hearing, tympanic membrane, external ear and ear canal normal.   Left Ear: Hearing, tympanic membrane, external ear and ear canal normal.   Nose: Nose normal. No mucosal edema, rhinorrhea or nasal deformity. No epistaxis. Right sinus exhibits no maxillary sinus tenderness and no frontal sinus tenderness. Left sinus exhibits no maxillary sinus tenderness and no frontal sinus tenderness.   Mouth/Throat: Uvula is midline, oropharynx is clear and moist and mucous membranes are normal. No trismus in the jaw. Normal dentition. No uvula swelling. No posterior oropharyngeal erythema.   Eyes: Conjunctivae and lids are normal. Right eye exhibits no discharge. Left eye exhibits no discharge. No scleral icterus.   Neck: Trachea normal, normal range of motion, full passive range of motion without pain and phonation normal. Neck supple.   Cardiovascular: Normal rate, regular rhythm, normal heart sounds, intact distal pulses and normal pulses.   Pulmonary/Chest: Effort normal and breath sounds normal. No respiratory distress.   Abdominal: Soft. Normal appearance and bowel sounds are normal. She exhibits no distension, no pulsatile midline mass and no mass. There is no tenderness.   Musculoskeletal: Normal range of motion. She exhibits no edema or deformity.   Neurological: She is alert and oriented to person, place, and time. She exhibits normal muscle tone. Coordination normal.   Skin: Skin is warm, dry, intact, not diaphoretic and not pale.   Psychiatric: She has a normal mood and affect. Her speech is normal and behavior is normal. Judgment and thought content normal. Cognition  and memory are normal.   Nursing note and vitals reviewed.        Office Visit on 10/27/2019   Component Date Value Ref Range Status    POC Blood, Urine 10/27/2019 Positive* Negative Final    250 RBC/uL    POC Bilirubin, Urine 10/27/2019 Positive* Negative Final    0.5 mg/dL    POC Urobilinogen, Urine 10/27/2019 4.0* 0.1 - 1.1 Final    POC Ketones, Urine 10/27/2019 Positive* Negative Final    POC Protein, Urine 10/27/2019 Negative  Negative Final    POC Nitrates, Urine 10/27/2019 Positive* Negative Final    POC Glucose, Urine 10/27/2019 Negative  Negative Final    pH, UA 10/27/2019 6  5 - 8 Final    POC Specific Gravity, Urine 10/27/2019 1.020  1.003 - 1.029 Final    POC Leukocytes, Urine 10/27/2019 Positive* Negative Final    25 WBC/uL       Assessment:       1. Yeast vaginitis    2. Vaginal discharge    3. Dysuria    4. Bacterial UTI        Plan:         Yeast vaginitis  -     fluconazole (DIFLUCAN) 150 MG Tab; Take 1 tablet (150 mg total) by mouth once daily. for 2 days  Dispense: 2 tablet; Refill: 0    Vaginal discharge  -     POCT Urinalysis, Dipstick, Automated, W/O Scope  -     fluconazole (DIFLUCAN) 150 MG Tab; Take 1 tablet (150 mg total) by mouth once daily. for 2 days  Dispense: 2 tablet; Refill: 0    Dysuria  -     amoxicillin (AMOXIL) 875 MG tablet; Take 1 tablet (875 mg total) by mouth every 12 (twelve) hours. for 7 days  Dispense: 14 tablet; Refill: 0  -     Urine culture    Bacterial UTI  -     amoxicillin (AMOXIL) 875 MG tablet; Take 1 tablet (875 mg total) by mouth every 12 (twelve) hours. for 7 days  Dispense: 14 tablet; Refill: 0  -     Urine culture       yeast vaginitis  -pt declined exam  -diflucan - use as directed    Amee UTI  -UA n culture  -amoxil pending culture    Spent adequate time in obtaining history and explaining differentials    Follow up if symptoms worsen or fail to improve.

## 2019-10-27 NOTE — PATIENT INSTRUCTIONS
"  Dysuria     Painful urination (dysuria) is often caused by a problem in the urinary tract.   Dysuria is pain felt during urination. It is often described as a burning. Learn more about this problem and how it can be treated.  What causes dysuria?  Possible causes include:  · Infection with a bacteria or virus such as a urinary tract infection (UTI or a sexually transmitted infection (STI)  · Sensitivity or allergy to chemicals such as those found in lotions and other products  · Prostate or bladder problems  · Radiation therapy to the pelvic area  How is dysuria diagnosed?  Your healthcare provider will examine you. He or she will ask about your symptoms and health. After talking with you and doing a physical exam, your healthcare provider may know what is causing your dysuria. He or she will usually request  a sample of your urine. Tests of your urine, or a "urinalysis," are done. A urinalysis may include:  · Looking at the urine sample (visual exam)  · Checking for substances (chemical exam)  · Looking at a small amount under a microscope (microscopic exam)  Some parts of the urinalysis may be done in the provider's office and some in a lab. And, the urine sample may be checked for bacteria and yeast (urine culture). Your healthcare provider will tell you more about these tests if they are needed.  How is dysuria treated?  Treatment depends on the cause. If you have a bacterial infection, you may need antibiotics. You may be given medicines to make it easier for you to urinate and help relieve pain. Your healthcare provider can tell you more about your treatment options. Untreated, symptoms may get worse.  When to call your healthcare provider  Call the healthcare provider right away if you have any of the following:  · Fever of 100.4°F (38°C) or higher   · No improvement after three days of treatment  · Trouble urinating because of pain  · New or increased discharge from the vagina or penis  · Rash or joint " pain  · Increased back or abdominal pain  · Enlarged painful lymph nodes (lumps) in the groin   Date Last Reviewed: 1/1/2017  © 1241-2838 [employer]. [address] [place] [address], [place] [address]. All rights reserved. This information is not intended as a substitute for professional medical care. Always follow your healthcare professional's instructions.        Candida Vaginal Infection    You have a Candida vaginal infection. This is also known as a yeast infection. It is most often caused by a type of yeast (fungus) called Candida. Candida are normally found in the vagina. But if they increase in number, this can lead to infection and cause symptoms.  Symptoms of a yeast infection can include:  · Clumpy or thin, white discharge, which may look like cottage cheese  · Itching or burning  · Burning with urination  Certain factors can make a yeast infection more likely. These can include:  · Taking certain medicines, such as antibiotics or birth control pills  · Pregnancy  · Diabetes  · Weakened immune system  A yeast infection is most often treated with antifungal medicine. This may be given as a vaginal cream or pills you take by mouth. Treatment may last for about 1 to 7 days. Women with severe or recurrent infections may need longer courses of treatment.  Home care  · If youre prescribed medicine, be sure to use it as directed. Finish all of the medicine, even if your symptoms go away. Note: Dont try to treat yourself using over-the-counter products without talking to your provider first. He or she will let you know if this is a good option for you.  · Ask your provider what steps you can take to help reduce your risk of having a yeast infection in the future.  Follow-up care  Follow up with your healthcare provider, or as directed.  When to seek medical advice  Call your healthcare provider right away if:  · You have a fever of 100.4ºF (38ºC) or higher, or as directed by your provider.  · Your  symptoms worsen, or they dont go away within a few days of starting treatment.  · You have new pain in the lower belly or pelvic region.  · You have side effects that bother you or a reaction to the cream or pills youre prescribed.  · You or any partners you have sex with have new symptoms, such as a rash, joint pain, or sores.  Date Last Reviewed: 7/30/2015  © 9354-6508 [employer]. [address] [place] [address], [place] [address]. All rights reserved. This information is not intended as a substitute for professional medical care. Always follow your healthcare professional's instructions.

## 2019-10-30 LAB — BACTERIA UR CULT: ABNORMAL

## 2019-12-18 ENCOUNTER — OFFICE VISIT (OUTPATIENT)
Dept: URGENT CARE | Facility: CLINIC | Age: 49
End: 2019-12-18
Payer: MEDICAID

## 2019-12-18 VITALS
RESPIRATION RATE: 18 BRPM | TEMPERATURE: 98 F | SYSTOLIC BLOOD PRESSURE: 127 MMHG | BODY MASS INDEX: 35.63 KG/M2 | DIASTOLIC BLOOD PRESSURE: 80 MMHG | HEIGHT: 67 IN | HEART RATE: 78 BPM | WEIGHT: 227 LBS | OXYGEN SATURATION: 99 %

## 2019-12-18 DIAGNOSIS — J02.8 BACTERIAL PHARYNGITIS: Primary | ICD-10-CM

## 2019-12-18 DIAGNOSIS — B96.89 BACTERIAL PHARYNGITIS: Primary | ICD-10-CM

## 2019-12-18 DIAGNOSIS — J02.9 SORE THROAT: ICD-10-CM

## 2019-12-18 PROCEDURE — 99214 PR OFFICE/OUTPT VISIT, EST, LEVL IV, 30-39 MIN: ICD-10-PCS | Mod: S$GLB,,, | Performed by: PHYSICIAN ASSISTANT

## 2019-12-18 PROCEDURE — 99214 OFFICE O/P EST MOD 30 MIN: CPT | Mod: S$GLB,,, | Performed by: PHYSICIAN ASSISTANT

## 2019-12-18 PROCEDURE — 87081 CULTURE SCREEN ONLY: CPT

## 2019-12-18 RX ORDER — FLUTICASONE PROPIONATE 50 MCG
1 SPRAY, SUSPENSION (ML) NASAL DAILY
Qty: 1 BOTTLE | Refills: 0 | Status: SHIPPED | OUTPATIENT
Start: 2019-12-18

## 2019-12-18 RX ORDER — AMOXICILLIN AND CLAVULANATE POTASSIUM 875; 125 MG/1; MG/1
1 TABLET, FILM COATED ORAL 2 TIMES DAILY
Qty: 20 TABLET | Refills: 0 | Status: SHIPPED | OUTPATIENT
Start: 2019-12-18 | End: 2019-12-28

## 2019-12-18 NOTE — PATIENT INSTRUCTIONS
Self-Care for Sore Throats    Sore throats happen for many reasons, such as colds, allergies, and infections caused by viruses or bacteria. In any case, your throat becomes red and sore. Your goal for self-care is to reduce your discomfort while giving your throat a chance to heal.  Moisten and soothe your throat  Tips include the following:  · Try a sip of water first thing after waking up.  · Keep your throat moist by drinking 6 or more glasses of clear liquids every day.  · Run a cool-air humidifier in your room overnight.  · Avoid cigarette smoke.   · Suck on throat lozenges, cough drops, hard candy, ice chips, or frozen fruit-juice bars. Use the sugar-free versions if your diet or medical condition requires them.  Gargle to ease irritation  Gargling every hour or 2 can ease irritation. Try gargling with 1 of these solutions:  · 1/4 teaspoon of salt in 1/2 cup of warm water  · An over-the-counter anesthetic gargle  Use medicine for more relief  Over-the-counter medicine can reduce sore throat symptoms. Ask your pharmacist if you have questions about which medicine to use:  · Ease pain with anesthetic sprays. Aspirin or an aspirin substitute also helps. Remember, never give aspirin to anyone 18 or younger, or if you are already taking blood thinners.   · For sore throats caused by allergies, try antihistamines to block the allergic reaction.  · Remember: unless a sore throat is caused by a bacterial infection, antibiotics wont help you.  Prevent future sore throats  Prevention tips include the following:  · Stop smoking or reduce contact with secondhand smoke. Smoke irritates the tender throat lining.  · Limit contact with pets and with allergy-causing substances, such as pollen and mold.  · When youre around someone with a sore throat or cold, wash your hands often to keep viruses or bacteria from spreading.  · Dont strain your vocal cords.  Call your healthcare provider  Contact your healthcare provider if  you have:  · A temperature over 101°F (38.3°C)  · White spots on the throat  · Great difficulty swallowing  · Trouble breathing  · A skin rash  · Recent exposure to someone else with strep bacteria  · Severe hoarseness and swollen glands in the neck or jaw   Date Last Reviewed: 8/1/2016  © 2861-5900 [employer]. [address] [place] [address], [place] [address]. All rights reserved. This information is not intended as a substitute for professional medical care. Always follow your healthcare professional's instructions.      Patient Instructions   -Below are suggestions for symptomatic relief:              -Tylenol every 4 hours OR ibuprofen every 6 hours as needed for pain/fever.              -Salt water gargles to soothe throat pain.              -Chloroseptic spray also helps to numb throat pain.              -Nasal saline spray reduces inflammation and dryness.              -Warm face compresses to help with facial sinus pain/pressure.              -Vicks vapor rub at night.              -Flonase OTC or Nasacort OTC for nasal congestion.              -Simple foods like chicken noodle soup.              -Delsym helps with coughing at night              -Zyrtec/Claritin during the day & Benadryl at night may help with allergies.                If you DO NOT have Hypertension or any history of palpitations, it is ok to take over the counter Sudafed or Mucinex D or Allegra-D or Claritin-D or Zyrtec-D.  If you do take one of the above, it is ok to combine that with plain over the counter Mucinex or Allegra or Claritin or Zyrtec. If, for example, you are taking Zyrtec -D, you can combine that with Mucinex, but not Mucinex-D.  If you are taking Mucinex-D, you can combine that with plain Allegra or Claritin or Zyrtec.   If you DO have Hypertension or palpitations, it is safe to take Coricidin HBP for relief of sinus symptoms.    Please follow up with your Primary care provider within 2-5 days if your signs and  symptoms have not resolved or worsen.     If your condition worsens or fails to improve we recommend that you receive another evaluation at the emergency room immediately or contact your primary medical clinic to discuss your concerns.   You must understand that you have received an Urgent Care treatment only and that you may be released before all of your medical problems are known or treated. You, the patient, will arrange for follow up care as instructed.     RED FLAGS/WARNING SYMPTOMS DISCUSSED WITH PATIENT THAT WOULD WARRANT EMERGENT MEDICAL ATTENTION. PATIENT VERBALIZED UNDERSTANDING.

## 2019-12-18 NOTE — PROGRESS NOTES
"Subjective:       Patient ID: Leni Henson is a 49 y.o. female.    Vitals:  height is 5' 7.3" (1.709 m) and weight is 103 kg (227 lb). Her temperature is 98.4 °F (36.9 °C). Her blood pressure is 127/80 and her pulse is 78. Her respiration is 18 and oxygen saturation is 99%.     Chief Complaint: Sore Throat    Sore Throat    This is a new problem. The current episode started 1 to 4 weeks ago. The problem has been unchanged. Neither side of throat is experiencing more pain than the other. There has been no fever. The pain is at a severity of 10/10. The pain is severe. Associated symptoms include trouble swallowing. Pertinent negatives include no congestion, coughing, ear pain, shortness of breath, stridor or vomiting. Treatments tried: OTC med. The treatment provided mild relief.       Constitution: Negative for chills, sweating, fatigue and fever.   HENT: Positive for sore throat and trouble swallowing. Negative for ear pain, congestion, sinus pain, sinus pressure and voice change.    Neck: Negative for painful lymph nodes.   Eyes: Negative for eye redness.   Respiratory: Negative for chest tightness, cough, sputum production, bloody sputum, COPD, shortness of breath, stridor, wheezing and asthma.    Gastrointestinal: Negative for nausea and vomiting.   Musculoskeletal: Negative for muscle ache.   Skin: Negative for rash.   Allergic/Immunologic: Negative for seasonal allergies and asthma.   Hematologic/Lymphatic: Negative for swollen lymph nodes.       Objective:      Physical Exam   Constitutional: She is oriented to person, place, and time. She appears well-developed and well-nourished. She is cooperative.  Non-toxic appearance. She does not have a sickly appearance. She does not appear ill. No distress.   HENT:   Head: Normocephalic and atraumatic.   Right Ear: Hearing, tympanic membrane, external ear and ear canal normal.   Left Ear: Hearing, tympanic membrane, external ear and ear canal normal.   Nose: " Mucosal edema (boggy nasal mucosa) present. No rhinorrhea or nasal deformity. No epistaxis. Right sinus exhibits no maxillary sinus tenderness and no frontal sinus tenderness. Left sinus exhibits no maxillary sinus tenderness and no frontal sinus tenderness.   Mouth/Throat: Uvula is midline and mucous membranes are normal. No trismus in the jaw. Normal dentition. No uvula swelling. Posterior oropharyngeal erythema present. No oropharyngeal exudate, posterior oropharyngeal edema, tonsillar abscesses or cobblestoning. Tonsils are 1+ on the right. Tonsils are 1+ on the left. No tonsillar exudate.   Eyes: Conjunctivae and lids are normal. No scleral icterus.   Neck: Trachea normal, full passive range of motion without pain and phonation normal. Neck supple. No neck rigidity. No edema and no erythema present.   Cardiovascular: Normal rate, regular rhythm, normal heart sounds, intact distal pulses and normal pulses.   Pulmonary/Chest: Effort normal and breath sounds normal. No accessory muscle usage or stridor. No respiratory distress. She has no decreased breath sounds. She has no wheezes. She has no rhonchi. She exhibits no tenderness.   Abdominal: Normal appearance.   Musculoskeletal: Normal range of motion. She exhibits no edema or deformity.   Lymphadenopathy:        Head (right side): Tonsillar adenopathy present. No submental, no submandibular, no preauricular and no posterior auricular adenopathy present.        Head (left side): Tonsillar adenopathy present. No submental, no submandibular, no preauricular and no posterior auricular adenopathy present.     She has cervical adenopathy (tender).        Right cervical: Superficial cervical adenopathy present.        Left cervical: Superficial cervical adenopathy present.   Neurological: She is alert and oriented to person, place, and time. She exhibits normal muscle tone. Coordination normal.   Skin: Skin is warm, dry, intact, not diaphoretic and not pale.    Psychiatric: She has a normal mood and affect. Her speech is normal and behavior is normal. Judgment and thought content normal. Cognition and memory are normal.   Nursing note and vitals reviewed.        Assessment:       1. Bacterial pharyngitis    2. Sore throat        Plan:         Bacterial pharyngitis  -     amoxicillin-clavulanate 875-125mg (AUGMENTIN) 875-125 mg per tablet; Take 1 tablet by mouth 2 (two) times daily. for 10 days  Dispense: 20 tablet; Refill: 0  -     Strep A culture, throat  -     fluticasone propionate (FLONASE) 50 mcg/actuation nasal spray; 1 spray (50 mcg total) by Each Nostril route once daily.  Dispense: 1 Bottle; Refill: 0    Sore throat     Patient also evaluated by Selena Dumont who agrees with assessment plan. Discussed return to clinic precautions well as ER precautions with patient her .  All of her questions were answered and verbalized understanding.  They agreed with the plan of care.       Self-Care for Sore Throats    Sore throats happen for many reasons, such as colds, allergies, and infections caused by viruses or bacteria. In any case, your throat becomes red and sore. Your goal for self-care is to reduce your discomfort while giving your throat a chance to heal.  Moisten and soothe your throat  Tips include the following:  · Try a sip of water first thing after waking up.  · Keep your throat moist by drinking 6 or more glasses of clear liquids every day.  · Run a cool-air humidifier in your room overnight.  · Avoid cigarette smoke.   · Suck on throat lozenges, cough drops, hard candy, ice chips, or frozen fruit-juice bars. Use the sugar-free versions if your diet or medical condition requires them.  Gargle to ease irritation  Gargling every hour or 2 can ease irritation. Try gargling with 1 of these solutions:  · 1/4 teaspoon of salt in 1/2 cup of warm water  · An over-the-counter anesthetic gargle  Use medicine for more relief  Over-the-counter medicine can  reduce sore throat symptoms. Ask your pharmacist if you have questions about which medicine to use:  · Ease pain with anesthetic sprays. Aspirin or an aspirin substitute also helps. Remember, never give aspirin to anyone 18 or younger, or if you are already taking blood thinners.   · For sore throats caused by allergies, try antihistamines to block the allergic reaction.  · Remember: unless a sore throat is caused by a bacterial infection, antibiotics wont help you.  Prevent future sore throats  Prevention tips include the following:  · Stop smoking or reduce contact with secondhand smoke. Smoke irritates the tender throat lining.  · Limit contact with pets and with allergy-causing substances, such as pollen and mold.  · When youre around someone with a sore throat or cold, wash your hands often to keep viruses or bacteria from spreading.  · Dont strain your vocal cords.  Call your healthcare provider  Contact your healthcare provider if you have:  · A temperature over 101°F (38.3°C)  · White spots on the throat  · Great difficulty swallowing  · Trouble breathing  · A skin rash  · Recent exposure to someone else with strep bacteria  · Severe hoarseness and swollen glands in the neck or jaw   Date Last Reviewed: 8/1/2016  © 8529-8553 [employer]. [address] [place] [address], [place] [address]. All rights reserved. This information is not intended as a substitute for professional medical care. Always follow your healthcare professional's instructions.      Patient Instructions   -Below are suggestions for symptomatic relief:              -Tylenol every 4 hours OR ibuprofen every 6 hours as needed for pain/fever.              -Salt water gargles to soothe throat pain.              -Chloroseptic spray also helps to numb throat pain.              -Nasal saline spray reduces inflammation and dryness.              -Warm face compresses to help with facial sinus pain/pressure.              -Vicks vapor rub at  night.              -Flonase OTC or Nasacort OTC for nasal congestion.              -Simple foods like chicken noodle soup.              -Delsym helps with coughing at night              -Zyrtec/Claritin during the day & Benadryl at night may help with allergies.                If you DO NOT have Hypertension or any history of palpitations, it is ok to take over the counter Sudafed or Mucinex D or Allegra-D or Claritin-D or Zyrtec-D.  If you do take one of the above, it is ok to combine that with plain over the counter Mucinex or Allegra or Claritin or Zyrtec. If, for example, you are taking Zyrtec -D, you can combine that with Mucinex, but not Mucinex-D.  If you are taking Mucinex-D, you can combine that with plain Allegra or Claritin or Zyrtec.   If you DO have Hypertension or palpitations, it is safe to take Coricidin HBP for relief of sinus symptoms.    Please follow up with your Primary care provider within 2-5 days if your signs and symptoms have not resolved or worsen.     If your condition worsens or fails to improve we recommend that you receive another evaluation at the emergency room immediately or contact your primary medical clinic to discuss your concerns.   You must understand that you have received an Urgent Care treatment only and that you may be released before all of your medical problems are known or treated. You, the patient, will arrange for follow up care as instructed.     RED FLAGS/WARNING SYMPTOMS DISCUSSED WITH PATIENT THAT WOULD WARRANT EMERGENT MEDICAL ATTENTION. PATIENT VERBALIZED UNDERSTANDING.

## 2019-12-18 NOTE — LETTER
December 18, 2019      [facility]  [address] [place] [address]  Phone: [phone]  Fax: [phone]       Patient: Leni Henson   YOB: 1970  Date of Visit: 12/18/2019    To Whom It May Concern:    Nilda Henson  was at [facility] on 12/18/2019. She may return to work/school on 12/20/2019 with no restrictions. If you have any questions or concerns, or if I can be of further assistance, please do not hesitate to contact me.    Sincerely,    Ruby Donnelly PA-C

## 2019-12-21 LAB — BACTERIA THROAT CULT: NORMAL

## 2019-12-22 ENCOUNTER — TELEPHONE (OUTPATIENT)
Dept: URGENT CARE | Facility: CLINIC | Age: 49
End: 2019-12-22

## 2019-12-22 NOTE — TELEPHONE ENCOUNTER
----- Message from Ruby Donnelly PA-C sent at 12/21/2019  8:38 AM CST -----  Please call the patient regarding her negative strep A culture results

## 2020-03-24 ENCOUNTER — NURSE TRIAGE (OUTPATIENT)
Dept: ADMINISTRATIVE | Facility: CLINIC | Age: 50
End: 2020-03-24

## 2020-03-25 NOTE — TELEPHONE ENCOUNTER
Hasn't been eating last two days, still been drinking plenty of fluids  Tightness in chest between breast intermittently since yesterday  Chest tightness has been present for around an hour tonight  Denies fever, denies cough    Reason for Disposition   Shock suspected (e.g., cold/pale/clammy skin, too weak to stand, low BP, rapid pulse)    Additional Information   Negative: Severe difficulty breathing (e.g., struggling for each breath, speaks in single words)   Negative: Difficult to awaken or acting confused (e.g., disoriented, slurred speech)    Protocols used: CHEST PAIN-A-AH

## 2020-03-30 ENCOUNTER — PATIENT MESSAGE (OUTPATIENT)
Dept: HEMATOLOGY/ONCOLOGY | Facility: CLINIC | Age: 50
End: 2020-03-30

## 2020-04-20 ENCOUNTER — TELEPHONE (OUTPATIENT)
Dept: HEMATOLOGY/ONCOLOGY | Facility: CLINIC | Age: 50
End: 2020-04-20

## 2020-04-20 NOTE — TELEPHONE ENCOUNTER
----- Message from Clari Chavarria RN sent at 4/20/2020 11:28 AM CDT -----      ----- Message -----  From: Cesia Back MD  Sent: 4/20/2020  11:18 AM CDT  To: Clari Chavarria RN, RT Radha    OK to move back by 2 months  ----- Message -----  From: RT Radha  Sent: 4/20/2020  10:46 AM CDT  To: Cesia Back MD    Due to concerns associated with Covid19 the radiology team is seeking to reschedule outpatient diagnostic exams between 3/21 - 4/30. Leni Henson is scheduled for CT Ch/Abd/Pel on 4- at [facility].  Please respond to this message if you believe it is safe to postpone this exam and the radiology team will reschedule and update you on the patient's response.  If you have concerns that postponement is not safe (urgent or time sensitive diagnostic study), then reply and it will be performed as ordered.   If further discussion needed, please provide a contact number and a Radiologist will reach out to you shortly.

## 2020-06-22 ENCOUNTER — TELEPHONE (OUTPATIENT)
Dept: HEMATOLOGY/ONCOLOGY | Facility: CLINIC | Age: 50
End: 2020-06-22

## 2020-06-23 ENCOUNTER — TELEPHONE (OUTPATIENT)
Dept: HEMATOLOGY/ONCOLOGY | Facility: CLINIC | Age: 50
End: 2020-06-23

## 2020-06-23 DIAGNOSIS — Z85.038 HISTORY OF COLON CANCER, STAGE II: Primary | ICD-10-CM

## 2020-06-23 NOTE — TELEPHONE ENCOUNTER
----- Message from Jesi Meyers sent at 6/22/2020  9:44 AM CDT -----  Regarding: reschedule missed appointments  Left VM on 6/22     Please contact patient to r/s her missed appointments.   ~Clari

## 2020-06-24 ENCOUNTER — HOSPITAL ENCOUNTER (OUTPATIENT)
Dept: RADIOLOGY | Facility: HOSPITAL | Age: 50
Discharge: HOME | End: 2020-06-24
Attending: INTERNAL MEDICINE
Payer: MEDICAID

## 2020-06-24 DIAGNOSIS — Z85.038 HISTORY OF COLON CANCER, STAGE II: ICD-10-CM

## 2020-06-24 PROCEDURE — 74177 CT ABD & PELVIS W/CONTRAST: CPT | Mod: 26,,, | Performed by: RADIOLOGY

## 2020-06-24 PROCEDURE — 25500020 PHARM REV CODE 255: Performed by: INTERNAL MEDICINE

## 2020-06-24 PROCEDURE — 71260 CT THORAX DX C+: CPT | Mod: 26,,, | Performed by: RADIOLOGY

## 2020-06-24 PROCEDURE — 74177 CT CHEST ABDOMEN PELVIS WITH CONTRAST (XPD): ICD-10-PCS | Mod: 26,,, | Performed by: RADIOLOGY

## 2020-06-24 PROCEDURE — 74177 CT ABD & PELVIS W/CONTRAST: CPT | Mod: TC

## 2020-06-24 PROCEDURE — 71260 CT CHEST ABDOMEN PELVIS WITH CONTRAST (XPD): ICD-10-PCS | Mod: 26,,, | Performed by: RADIOLOGY

## 2020-06-24 RX ADMIN — IOHEXOL 1000 ML: 12 SOLUTION ORAL at 03:06

## 2020-06-24 RX ADMIN — IOHEXOL 100 ML: 350 INJECTION, SOLUTION INTRAVENOUS at 04:06

## 2020-06-25 ENCOUNTER — OFFICE VISIT (OUTPATIENT)
Dept: HEMATOLOGY/ONCOLOGY | Facility: CLINIC | Age: 50
End: 2020-06-25
Payer: MEDICAID

## 2020-06-25 VITALS
RESPIRATION RATE: 16 BRPM | DIASTOLIC BLOOD PRESSURE: 73 MMHG | WEIGHT: 214.06 LBS | TEMPERATURE: 98 F | HEART RATE: 56 BPM | OXYGEN SATURATION: 96 % | BODY MASS INDEX: 33.6 KG/M2 | SYSTOLIC BLOOD PRESSURE: 118 MMHG | HEIGHT: 67 IN

## 2020-06-25 DIAGNOSIS — Z85.038 HISTORY OF COLON CANCER, STAGE II: Primary | ICD-10-CM

## 2020-06-25 PROCEDURE — 99213 OFFICE O/P EST LOW 20 MIN: CPT | Mod: PBBFAC | Performed by: INTERNAL MEDICINE

## 2020-06-25 PROCEDURE — 99999 PR PBB SHADOW E&M-EST. PATIENT-LVL III: CPT | Mod: PBBFAC,,, | Performed by: INTERNAL MEDICINE

## 2020-06-25 PROCEDURE — 99999 PR PBB SHADOW E&M-EST. PATIENT-LVL III: ICD-10-PCS | Mod: PBBFAC,,, | Performed by: INTERNAL MEDICINE

## 2020-06-25 PROCEDURE — 99213 PR OFFICE/OUTPT VISIT, EST, LEVL III, 20-29 MIN: ICD-10-PCS | Mod: S$PBB,,, | Performed by: INTERNAL MEDICINE

## 2020-06-25 PROCEDURE — 99213 OFFICE O/P EST LOW 20 MIN: CPT | Mod: S$PBB,,, | Performed by: INTERNAL MEDICINE

## 2020-06-25 NOTE — PROGRESS NOTES
"Subjective:       Patient ID: Leni Henson is a 49 y.o. female.    Chief Complaint: History of colon cancer, stage II  Ms. Leni Henson is a 49-year-old female who has been having left-sided abdominal pain for about 2-3 years along with occasional episodes of blood in the stool.  She finally sought care and underwent a   colonoscopy on 07/25/2018 and that revealed likely malignant completely obstructing tumor in the sigmoid colon, which was biopsied revealing moderately differentiated colon cancer.  She underwent restaging CT scans and was noted to have a 4 mm nodule in the lung and repeat was recommended at 12 months.  She was then taken for sigmoid colectomy, which she underwent on 08/06/2018 and   Pathology from that revealed a pathologic T3 N0 M0, 17 lymph nodes were examined, which were negative.  It was moderately differentiated, no perineural invasion, no lymphovascular invasion, no localized perforation.  All margins were negative     She underwent colonoscopy in 10/18 which was normal and a repeat will be done in 3 years     HPI She comes in to review her CT scans from 6/25/2020 which revealed "No significant interval detrimental change.  No evidence for metastatic disease.  Continued surveillance. Stable additional findings as above. There are no measurable lesions per RECIST criteria"  She denies any new issues.    Review of Systems   Constitutional: Negative for appetite change, fatigue and unexpected weight change.   HENT: Negative for mouth sores.    Eyes: Negative for visual disturbance.   Respiratory: Negative for cough and shortness of breath.    Cardiovascular: Negative for chest pain.   Gastrointestinal: Negative for abdominal pain and diarrhea.   Genitourinary: Negative for frequency.   Musculoskeletal: Negative for back pain.   Integumentary:  Negative for rash.   Neurological: Negative for headaches.   Hematological: Negative for adenopathy.   Psychiatric/Behavioral: The patient is not " nervous/anxious.    All other systems reviewed and are negative.        Objective:      Physical Exam  Vitals signs reviewed.   Constitutional:       Appearance: She is well-developed.   HENT:      Mouth/Throat:      Pharynx: No oropharyngeal exudate.   Cardiovascular:      Rate and Rhythm: Normal rate.      Heart sounds: Normal heart sounds.   Pulmonary:      Effort: Pulmonary effort is normal.      Breath sounds: Normal breath sounds. No wheezing.   Abdominal:      General: Bowel sounds are normal.      Palpations: Abdomen is soft.      Tenderness: There is no abdominal tenderness.   Musculoskeletal:         General: No tenderness.   Lymphadenopathy:      Cervical: No cervical adenopathy.   Skin:     General: Skin is warm and dry.      Findings: No rash.   Neurological:      Mental Status: She is alert and oriented to person, place, and time.      Coordination: Coordination normal.   Psychiatric:         Thought Content: Thought content normal.         Judgment: Judgment normal.           LABS:  WBC   Date Value Ref Range Status   06/24/2020 6.51 3.90 - 12.70 K/uL Final     Hemoglobin   Date Value Ref Range Status   06/24/2020 12.1 12.0 - 16.0 g/dL Final     Hematocrit   Date Value Ref Range Status   06/24/2020 37.6 37.0 - 48.5 % Final     Platelets   Date Value Ref Range Status   06/24/2020 310 150 - 350 K/uL Final     Gran # (ANC)   Date Value Ref Range Status   06/24/2020 3.3 1.8 - 7.7 K/uL Final     Comment:     The ANC is based on a white cell differential from an   automated cell counter. It has not been microscopically   reviewed for the presence of abnormal cells. Clinical   correlation is required.         Chemistry        Component Value Date/Time     06/24/2020 1420    K 3.0 (L) 06/24/2020 1420     06/24/2020 1420    CO2 28 06/24/2020 1420    BUN 8 06/24/2020 1420    CREATININE 0.8 06/24/2020 1420     06/24/2020 1420        Component Value Date/Time    CALCIUM 9.1 06/24/2020 1420     ALKPHOS 106 06/24/2020 1420    AST 23 06/24/2020 1420    ALT 22 06/24/2020 1420    BILITOT 0.3 06/24/2020 1420    ESTGFRAFRICA >60 06/24/2020 1420    EGFRNONAA >60 06/24/2020 1420        CEA: 2.0     Next colonoscopy in 10/2021   Assessment:       1. History of colon cancer, stage II        Plan:        1. She is doing well with no evidence of recurrence and will return in 6 months with labs and CT scans    Above care plan was discussed with patient and all questions were addressed to her satisfaction

## 2020-08-14 DIAGNOSIS — Z11.59 NEED FOR HEPATITIS C SCREENING TEST: ICD-10-CM

## 2020-10-05 ENCOUNTER — PATIENT MESSAGE (OUTPATIENT)
Dept: ADMINISTRATIVE | Facility: HOSPITAL | Age: 50
End: 2020-10-05

## 2021-01-04 ENCOUNTER — PATIENT MESSAGE (OUTPATIENT)
Dept: ADMINISTRATIVE | Facility: HOSPITAL | Age: 51
End: 2021-01-04

## 2021-01-11 ENCOUNTER — TELEPHONE (OUTPATIENT)
Dept: HEMATOLOGY/ONCOLOGY | Facility: CLINIC | Age: 51
End: 2021-01-11

## 2021-01-27 ENCOUNTER — HOSPITAL ENCOUNTER (OUTPATIENT)
Dept: RADIOLOGY | Facility: HOSPITAL | Age: 51
Discharge: HOME | End: 2021-01-27
Attending: INTERNAL MEDICINE
Payer: MEDICAID

## 2021-01-27 DIAGNOSIS — Z85.038 HISTORY OF COLON CANCER, STAGE II: ICD-10-CM

## 2021-01-27 PROCEDURE — 74177 CT ABD & PELVIS W/CONTRAST: CPT | Mod: 26,,, | Performed by: RADIOLOGY

## 2021-01-27 PROCEDURE — 71260 CT CHEST ABDOMEN PELVIS WITH CONTRAST (XPD): ICD-10-PCS | Mod: 26,,, | Performed by: RADIOLOGY

## 2021-01-27 PROCEDURE — 74177 CT ABD & PELVIS W/CONTRAST: CPT | Mod: TC

## 2021-01-27 PROCEDURE — 25500020 PHARM REV CODE 255: Performed by: INTERNAL MEDICINE

## 2021-01-27 PROCEDURE — 74177 CT CHEST ABDOMEN PELVIS WITH CONTRAST (XPD): ICD-10-PCS | Mod: 26,,, | Performed by: RADIOLOGY

## 2021-01-27 PROCEDURE — 71260 CT THORAX DX C+: CPT | Mod: 26,,, | Performed by: RADIOLOGY

## 2021-01-27 RX ADMIN — IOHEXOL 100 ML: 350 INJECTION, SOLUTION INTRAVENOUS at 11:01

## 2021-01-27 RX ADMIN — IOHEXOL 15 ML: 350 INJECTION, SOLUTION INTRAVENOUS at 11:01

## 2021-01-28 ENCOUNTER — LAB VISIT (OUTPATIENT)
Dept: LAB | Facility: HOSPITAL | Age: 51
End: 2021-01-28
Attending: INTERNAL MEDICINE
Payer: MEDICAID

## 2021-01-28 ENCOUNTER — OFFICE VISIT (OUTPATIENT)
Dept: HEMATOLOGY/ONCOLOGY | Facility: CLINIC | Age: 51
End: 2021-01-28
Payer: MEDICAID

## 2021-01-28 VITALS
OXYGEN SATURATION: 99 % | WEIGHT: 224.63 LBS | HEIGHT: 67 IN | TEMPERATURE: 97 F | SYSTOLIC BLOOD PRESSURE: 114 MMHG | BODY MASS INDEX: 35.26 KG/M2 | DIASTOLIC BLOOD PRESSURE: 77 MMHG | RESPIRATION RATE: 16 BRPM | HEART RATE: 65 BPM

## 2021-01-28 DIAGNOSIS — Z85.038 HISTORY OF COLON CANCER, STAGE II: ICD-10-CM

## 2021-01-28 DIAGNOSIS — Z85.038 HISTORY OF COLON CANCER, STAGE II: Primary | ICD-10-CM

## 2021-01-28 LAB
ALBUMIN SERPL BCP-MCNC: 3.5 G/DL (ref 3.5–5.2)
ALP SERPL-CCNC: 114 U/L (ref 55–135)
ALT SERPL W/O P-5'-P-CCNC: 37 U/L (ref 10–44)
ANION GAP SERPL CALC-SCNC: 7 MMOL/L (ref 8–16)
AST SERPL-CCNC: 41 U/L (ref 10–40)
BILIRUB SERPL-MCNC: 0.3 MG/DL (ref 0.1–1)
BUN SERPL-MCNC: 13 MG/DL (ref 6–20)
CALCIUM SERPL-MCNC: 8.9 MG/DL (ref 8.7–10.5)
CEA SERPL-MCNC: 1.6 NG/ML (ref 0–5)
CHLORIDE SERPL-SCNC: 105 MMOL/L (ref 95–110)
CO2 SERPL-SCNC: 26 MMOL/L (ref 23–29)
CREAT SERPL-MCNC: 0.8 MG/DL (ref 0.5–1.4)
ERYTHROCYTE [DISTWIDTH] IN BLOOD BY AUTOMATED COUNT: 12.8 % (ref 11.5–14.5)
EST. GFR  (AFRICAN AMERICAN): >60 ML/MIN/1.73 M^2
EST. GFR  (NON AFRICAN AMERICAN): >60 ML/MIN/1.73 M^2
GLUCOSE SERPL-MCNC: 101 MG/DL (ref 70–110)
HCT VFR BLD AUTO: 40 % (ref 37–48.5)
HGB BLD-MCNC: 12.5 G/DL (ref 12–16)
IMM GRANULOCYTES # BLD AUTO: 0.01 K/UL (ref 0–0.04)
MCH RBC QN AUTO: 27.8 PG (ref 27–31)
MCHC RBC AUTO-ENTMCNC: 31.3 G/DL (ref 32–36)
MCV RBC AUTO: 89 FL (ref 82–98)
NEUTROPHILS # BLD AUTO: 3.5 K/UL (ref 1.8–7.7)
PLATELET # BLD AUTO: 275 K/UL (ref 150–350)
PMV BLD AUTO: 9.5 FL (ref 9.2–12.9)
POTASSIUM SERPL-SCNC: 3.8 MMOL/L (ref 3.5–5.1)
PROT SERPL-MCNC: 7.8 G/DL (ref 6–8.4)
RBC # BLD AUTO: 4.5 M/UL (ref 4–5.4)
SODIUM SERPL-SCNC: 138 MMOL/L (ref 136–145)
WBC # BLD AUTO: 6.6 K/UL (ref 3.9–12.7)

## 2021-01-28 PROCEDURE — 36415 COLL VENOUS BLD VENIPUNCTURE: CPT

## 2021-01-28 PROCEDURE — 82378 CARCINOEMBRYONIC ANTIGEN: CPT

## 2021-01-28 PROCEDURE — 99999 PR PBB SHADOW E&M-EST. PATIENT-LVL III: CPT | Mod: PBBFAC,,, | Performed by: INTERNAL MEDICINE

## 2021-01-28 PROCEDURE — 85027 COMPLETE CBC AUTOMATED: CPT

## 2021-01-28 PROCEDURE — 99213 OFFICE O/P EST LOW 20 MIN: CPT | Mod: PBBFAC | Performed by: INTERNAL MEDICINE

## 2021-01-28 PROCEDURE — 99999 PR PBB SHADOW E&M-EST. PATIENT-LVL III: ICD-10-PCS | Mod: PBBFAC,,, | Performed by: INTERNAL MEDICINE

## 2021-01-28 PROCEDURE — 99214 PR OFFICE/OUTPT VISIT, EST, LEVL IV, 30-39 MIN: ICD-10-PCS | Mod: S$PBB,,, | Performed by: INTERNAL MEDICINE

## 2021-01-28 PROCEDURE — 99214 OFFICE O/P EST MOD 30 MIN: CPT | Mod: S$PBB,,, | Performed by: INTERNAL MEDICINE

## 2021-01-28 PROCEDURE — 80053 COMPREHEN METABOLIC PANEL: CPT

## 2021-03-30 ENCOUNTER — IMMUNIZATION (OUTPATIENT)
Dept: PRIMARY CARE CLINIC | Facility: CLINIC | Age: 51
End: 2021-03-30
Payer: MEDICAID

## 2021-03-30 DIAGNOSIS — Z23 NEED FOR VACCINATION: Primary | ICD-10-CM

## 2021-03-30 PROCEDURE — 91301 PR SARS-COV-2 COVID-19 VACCINE, NO PRSV, 100MCG/0.5ML, IM: ICD-10-PCS | Mod: S$GLB,,, | Performed by: NURSE PRACTITIONER

## 2021-03-30 PROCEDURE — 91301 PR SARS-COV-2 COVID-19 VACCINE, NO PRSV, 100MCG/0.5ML, IM: CPT | Mod: S$GLB,,, | Performed by: NURSE PRACTITIONER

## 2021-03-30 PROCEDURE — 0011A PR IMMUNIZ ADMIN, SARS-COV-2 COVID-19 VACC, 100MCG/0.5ML, 1ST DOSE: CPT | Mod: CV19,S$GLB,, | Performed by: NURSE PRACTITIONER

## 2021-03-30 PROCEDURE — 0011A PR IMMUNIZ ADMIN, SARS-COV-2 COVID-19 VACC, 100MCG/0.5ML, 1ST DOSE: ICD-10-PCS | Mod: CV19,S$GLB,, | Performed by: NURSE PRACTITIONER

## 2021-03-30 RX ADMIN — Medication 0.5 ML: at 09:03

## 2021-04-05 ENCOUNTER — PATIENT MESSAGE (OUTPATIENT)
Dept: ADMINISTRATIVE | Facility: HOSPITAL | Age: 51
End: 2021-04-05

## 2021-04-28 ENCOUNTER — IMMUNIZATION (OUTPATIENT)
Dept: PRIMARY CARE CLINIC | Facility: CLINIC | Age: 51
End: 2021-04-28
Payer: MEDICAID

## 2021-04-28 DIAGNOSIS — Z23 NEED FOR VACCINATION: Primary | ICD-10-CM

## 2021-04-28 PROCEDURE — 91301 PR SARS-COV-2 COVID-19 VACCINE, NO PRSV, 100MCG/0.5ML, IM: CPT | Mod: S$GLB,,, | Performed by: INTERNAL MEDICINE

## 2021-04-28 PROCEDURE — 0012A PR IMMUNIZ ADMIN, SARS-COV-2 COVID-19 VACC, 100MCG/0.5ML, 2ND DOSE: ICD-10-PCS | Mod: CV19,S$GLB,, | Performed by: INTERNAL MEDICINE

## 2021-04-28 PROCEDURE — 0012A PR IMMUNIZ ADMIN, SARS-COV-2 COVID-19 VACC, 100MCG/0.5ML, 2ND DOSE: CPT | Mod: CV19,S$GLB,, | Performed by: INTERNAL MEDICINE

## 2021-04-28 PROCEDURE — 91301 PR SARS-COV-2 COVID-19 VACCINE, NO PRSV, 100MCG/0.5ML, IM: ICD-10-PCS | Mod: S$GLB,,, | Performed by: INTERNAL MEDICINE

## 2021-04-28 RX ADMIN — Medication 0.5 ML: at 08:04

## 2021-05-19 ENCOUNTER — HOSPITAL ENCOUNTER (EMERGENCY)
Facility: HOSPITAL | Age: 51
Discharge: HOME | End: 2021-05-19
Attending: EMERGENCY MEDICINE
Payer: MEDICAID

## 2021-05-19 VITALS
OXYGEN SATURATION: 98 % | RESPIRATION RATE: 16 BRPM | HEIGHT: 67 IN | BODY MASS INDEX: 34.06 KG/M2 | DIASTOLIC BLOOD PRESSURE: 68 MMHG | TEMPERATURE: 99 F | SYSTOLIC BLOOD PRESSURE: 116 MMHG | HEART RATE: 55 BPM | WEIGHT: 217 LBS

## 2021-05-19 DIAGNOSIS — N30.01 ACUTE CYSTITIS WITH HEMATURIA: Primary | ICD-10-CM

## 2021-05-19 PROBLEM — D50.9 IRON DEFICIENCY ANEMIA: Status: ACTIVE | Noted: 2019-02-22

## 2021-05-19 PROBLEM — R73.03 PRE-DIABETES: Status: ACTIVE | Noted: 2019-04-25

## 2021-05-19 PROBLEM — J30.2 SEASONAL ALLERGIES: Status: ACTIVE | Noted: 2019-02-22

## 2021-05-19 LAB
BACTERIA #/AREA URNS AUTO: ABNORMAL /HPF
BILIRUB UR QL STRIP: NEGATIVE
CLARITY UR REFRACT.AUTO: ABNORMAL
COLOR UR AUTO: YELLOW
GLUCOSE UR QL STRIP: NEGATIVE
HGB UR QL STRIP: ABNORMAL
HYALINE CASTS UR QL AUTO: 0 /LPF
KETONES UR QL STRIP: NEGATIVE
LEUKOCYTE ESTERASE UR QL STRIP: ABNORMAL
MICROSCOPIC COMMENT: ABNORMAL
NITRITE UR QL STRIP: NEGATIVE
PH UR STRIP: 5 [PH] (ref 5–8)
PROT UR QL STRIP: ABNORMAL
RBC #/AREA URNS AUTO: 20 /HPF (ref 0–4)
SP GR UR STRIP: 1.02 (ref 1–1.03)
URN SPEC COLLECT METH UR: ABNORMAL
UROBILINOGEN UR STRIP-ACNC: NEGATIVE EU/DL
WBC #/AREA URNS AUTO: 60 /HPF (ref 0–5)
WBC CLUMPS UR QL AUTO: ABNORMAL
YEAST UR QL AUTO: ABNORMAL

## 2021-05-19 PROCEDURE — 81000 URINALYSIS NONAUTO W/SCOPE: CPT | Mod: ER | Performed by: EMERGENCY MEDICINE

## 2021-05-19 PROCEDURE — 87186 SC STD MICRODIL/AGAR DIL: CPT | Mod: ER | Performed by: EMERGENCY MEDICINE

## 2021-05-19 PROCEDURE — 87088 URINE BACTERIA CULTURE: CPT | Mod: ER | Performed by: EMERGENCY MEDICINE

## 2021-05-19 PROCEDURE — 25000003 PHARM REV CODE 250: Mod: ER | Performed by: EMERGENCY MEDICINE

## 2021-05-19 PROCEDURE — 87077 CULTURE AEROBIC IDENTIFY: CPT | Mod: ER | Performed by: EMERGENCY MEDICINE

## 2021-05-19 PROCEDURE — 87086 URINE CULTURE/COLONY COUNT: CPT | Mod: ER | Performed by: EMERGENCY MEDICINE

## 2021-05-19 PROCEDURE — 99284 EMERGENCY DEPT VISIT MOD MDM: CPT | Mod: ER

## 2021-05-19 RX ORDER — PHENAZOPYRIDINE HYDROCHLORIDE 200 MG/1
200 TABLET, FILM COATED ORAL 3 TIMES DAILY
Qty: 6 TABLET | Refills: 0 | Status: SHIPPED | OUTPATIENT
Start: 2021-05-19 | End: 2021-05-19 | Stop reason: SDUPTHER

## 2021-05-19 RX ORDER — ATORVASTATIN CALCIUM 20 MG/1
20 TABLET, FILM COATED ORAL DAILY
COMMUNITY
Start: 2021-04-13

## 2021-05-19 RX ORDER — CEPHALEXIN 500 MG/1
500 CAPSULE ORAL
Status: COMPLETED | OUTPATIENT
Start: 2021-05-19 | End: 2021-05-19

## 2021-05-19 RX ORDER — KETOROLAC TROMETHAMINE 10 MG/1
10 TABLET, FILM COATED ORAL
Status: COMPLETED | OUTPATIENT
Start: 2021-05-19 | End: 2021-05-19

## 2021-05-19 RX ORDER — PHENAZOPYRIDINE HYDROCHLORIDE 200 MG/1
200 TABLET, FILM COATED ORAL 3 TIMES DAILY
Qty: 6 TABLET | Refills: 0 | Status: SHIPPED | OUTPATIENT
Start: 2021-05-19 | End: 2021-05-29

## 2021-05-19 RX ORDER — CEPHALEXIN 500 MG/1
500 CAPSULE ORAL EVERY 12 HOURS
Qty: 20 CAPSULE | Refills: 0 | Status: SHIPPED | OUTPATIENT
Start: 2021-05-19 | End: 2021-05-29

## 2021-05-19 RX ORDER — OMEPRAZOLE 20 MG/1
20 CAPSULE, DELAYED RELEASE ORAL DAILY
COMMUNITY
Start: 2021-04-09

## 2021-05-19 RX ORDER — CEPHALEXIN 500 MG/1
500 CAPSULE ORAL EVERY 12 HOURS
Qty: 20 CAPSULE | Refills: 0 | Status: SHIPPED | OUTPATIENT
Start: 2021-05-19 | End: 2021-05-19 | Stop reason: SDUPTHER

## 2021-05-19 RX ADMIN — KETOROLAC TROMETHAMINE 10 MG: 10 TABLET, FILM COATED ORAL at 09:05

## 2021-05-19 RX ADMIN — CEPHALEXIN 500 MG: 500 CAPSULE ORAL at 09:05

## 2021-05-21 LAB — BACTERIA UR CULT: ABNORMAL

## 2021-05-22 RX ORDER — NITROFURANTOIN 25; 75 MG/1; MG/1
100 CAPSULE ORAL 2 TIMES DAILY
Qty: 10 CAPSULE | Refills: 0 | Status: SHIPPED | OUTPATIENT
Start: 2021-05-22 | End: 2021-05-27

## 2021-07-27 ENCOUNTER — HOSPITAL ENCOUNTER (OUTPATIENT)
Dept: RADIOLOGY | Facility: HOSPITAL | Age: 51
Discharge: HOME | End: 2021-07-27
Attending: INTERNAL MEDICINE
Payer: MEDICAID

## 2021-07-27 DIAGNOSIS — Z85.038 HISTORY OF COLON CANCER, STAGE II: ICD-10-CM

## 2021-07-27 PROCEDURE — 25500020 PHARM REV CODE 255: Mod: PO | Performed by: INTERNAL MEDICINE

## 2021-07-27 PROCEDURE — 71260 CT THORAX DX C+: CPT | Mod: TC,PO

## 2021-07-27 PROCEDURE — 74177 CT ABD & PELVIS W/CONTRAST: CPT | Mod: TC,PO

## 2021-07-27 RX ADMIN — IOHEXOL 30 ML: 300 INJECTION, SOLUTION INTRAVENOUS at 10:07

## 2021-07-27 RX ADMIN — IOHEXOL 100 ML: 350 INJECTION, SOLUTION INTRAVENOUS at 10:07

## 2021-07-28 ENCOUNTER — OFFICE VISIT (OUTPATIENT)
Dept: HEMATOLOGY/ONCOLOGY | Facility: CLINIC | Age: 51
End: 2021-07-28
Payer: MEDICAID

## 2021-07-28 VITALS
DIASTOLIC BLOOD PRESSURE: 81 MMHG | SYSTOLIC BLOOD PRESSURE: 114 MMHG | HEART RATE: 71 BPM | BODY MASS INDEX: 33.49 KG/M2 | TEMPERATURE: 99 F | HEIGHT: 67 IN | RESPIRATION RATE: 18 BRPM | WEIGHT: 213.38 LBS

## 2021-07-28 DIAGNOSIS — M79.601 PAIN OF RIGHT UPPER EXTREMITY: ICD-10-CM

## 2021-07-28 DIAGNOSIS — Z85.038 HISTORY OF COLON CANCER, STAGE II: Primary | ICD-10-CM

## 2021-07-28 PROCEDURE — 99999 PR PBB SHADOW E&M-EST. PATIENT-LVL III: ICD-10-PCS | Mod: PBBFAC,,, | Performed by: INTERNAL MEDICINE

## 2021-07-28 PROCEDURE — 99214 PR OFFICE/OUTPT VISIT, EST, LEVL IV, 30-39 MIN: ICD-10-PCS | Mod: S$PBB,,, | Performed by: INTERNAL MEDICINE

## 2021-07-28 PROCEDURE — 99214 OFFICE O/P EST MOD 30 MIN: CPT | Mod: S$PBB,,, | Performed by: INTERNAL MEDICINE

## 2021-07-28 PROCEDURE — 99999 PR PBB SHADOW E&M-EST. PATIENT-LVL III: CPT | Mod: PBBFAC,,, | Performed by: INTERNAL MEDICINE

## 2021-07-28 PROCEDURE — 99213 OFFICE O/P EST LOW 20 MIN: CPT | Mod: PBBFAC | Performed by: INTERNAL MEDICINE

## 2021-07-28 RX ORDER — BUDESONIDE AND FORMOTEROL FUMARATE DIHYDRATE 160; 4.5 UG/1; UG/1
AEROSOL RESPIRATORY (INHALATION)
COMMUNITY
Start: 2021-07-22

## 2021-07-28 RX ORDER — LORATADINE 10 MG/1
10 TABLET ORAL DAILY
COMMUNITY
Start: 2021-06-18

## 2021-07-28 RX ORDER — MELOXICAM 15 MG/1
15 TABLET ORAL 2 TIMES DAILY
COMMUNITY
Start: 2021-05-26

## 2021-07-28 RX ORDER — TRAMADOL HYDROCHLORIDE 50 MG/1
50 TABLET ORAL EVERY 6 HOURS PRN
COMMUNITY
Start: 2021-07-22

## 2021-07-28 RX ORDER — ALBUTEROL SULFATE 90 UG/1
AEROSOL, METERED RESPIRATORY (INHALATION)
COMMUNITY
Start: 2021-07-09

## 2021-07-28 RX ORDER — LIDOCAINE 50 MG/G
PATCH TOPICAL
COMMUNITY
Start: 2021-07-09

## 2021-07-28 RX ORDER — METHOCARBAMOL 500 MG/1
500 TABLET, FILM COATED ORAL DAILY
COMMUNITY
Start: 2021-05-26

## 2021-07-28 RX ORDER — GABAPENTIN 300 MG/1
600 CAPSULE ORAL NIGHTLY
COMMUNITY
Start: 2021-07-09

## 2021-07-29 ENCOUNTER — TELEPHONE (OUTPATIENT)
Dept: HEMATOLOGY/ONCOLOGY | Facility: CLINIC | Age: 51
End: 2021-07-29

## 2021-07-30 ENCOUNTER — HOSPITAL ENCOUNTER (OUTPATIENT)
Dept: RADIOLOGY | Facility: HOSPITAL | Age: 51
Discharge: HOME | End: 2021-07-30
Attending: INTERNAL MEDICINE
Payer: MEDICAID

## 2021-07-30 DIAGNOSIS — M79.601 PAIN OF RIGHT UPPER EXTREMITY: ICD-10-CM

## 2021-07-30 PROCEDURE — 76881 US COMPL JOINT R-T W/IMG: CPT | Mod: TC,PO,RT

## 2021-08-02 ENCOUNTER — PATIENT MESSAGE (OUTPATIENT)
Dept: HEMATOLOGY/ONCOLOGY | Facility: CLINIC | Age: 51
End: 2021-08-02

## 2022-01-10 ENCOUNTER — PATIENT MESSAGE (OUTPATIENT)
Dept: HEMATOLOGY/ONCOLOGY | Facility: CLINIC | Age: 52
End: 2022-01-10
Payer: MEDICAID

## 2022-01-11 NOTE — TELEPHONE ENCOUNTER
"See last note from July 2021 from me   "She is doing well from colon cancer stand point with no evidence of recurrence on CT scans. She is due for a colonoscopy, order placed and phone number provided for her to schedule. She will return in 6 months with labs and Ct scans"  "

## 2022-02-04 ENCOUNTER — TELEPHONE (OUTPATIENT)
Dept: HEMATOLOGY/ONCOLOGY | Facility: CLINIC | Age: 52
End: 2022-02-04
Payer: MEDICAID

## 2022-02-04 NOTE — TELEPHONE ENCOUNTER
"----- Message from Colleen Conroy sent at 2/4/2022 10:04 AM CST -----  Regarding: Appt  Contact: Leni Tabares Request         Patient Status: Est    Scheduling Appt : labs and follow up    Contact Preference?:[phone]    Treating Provider: Nazanin    Do you feel you need to be seen today? No           Additional Notes:  "Thank you for all that you do for our patients'     "

## 2022-02-10 ENCOUNTER — TELEPHONE (OUTPATIENT)
Dept: HEMATOLOGY/ONCOLOGY | Facility: CLINIC | Age: 52
End: 2022-02-10
Payer: MEDICAID

## 2022-02-10 DIAGNOSIS — Z12.11 SPECIAL SCREENING FOR MALIGNANT NEOPLASMS, COLON: Primary | ICD-10-CM

## 2022-02-10 DIAGNOSIS — Z01.812 PRE-PROCEDURE LAB EXAM: ICD-10-CM

## 2022-02-10 RX ORDER — POLYETHYLENE GLYCOL 3350, SODIUM SULFATE ANHYDROUS, SODIUM BICARBONATE, SODIUM CHLORIDE, POTASSIUM CHLORIDE 236; 22.74; 6.74; 5.86; 2.97 G/4L; G/4L; G/4L; G/4L; G/4L
4 POWDER, FOR SOLUTION ORAL ONCE
Qty: 4000 ML | Refills: 0 | Status: SHIPPED | OUTPATIENT
Start: 2022-02-10 | End: 2022-02-10

## 2022-02-10 NOTE — TELEPHONE ENCOUNTER
"----- Message from Colleen Conroy sent at 2/10/2022 12:32 PM CST -----  Regarding: Speak with office  Contact: Leni  Consult/Advisory:       Name Of Caller: Leni      Contact Preference?:[phone]       Does patient feel the need to be seen today? No      What is the nature of the call?: Pt is calling to speak with nurse to see who schedule colonoscopy test.       Additional Notes:  "Thank you for all that you do for our patients'"      "

## 2022-02-18 ENCOUNTER — HOSPITAL ENCOUNTER (OUTPATIENT)
Dept: RADIOLOGY | Facility: HOSPITAL | Age: 52
Discharge: HOME | End: 2022-02-18
Attending: INTERNAL MEDICINE
Payer: MEDICAID

## 2022-02-18 DIAGNOSIS — Z85.038 HISTORY OF COLON CANCER, STAGE II: ICD-10-CM

## 2022-02-18 PROCEDURE — 71260 CT THORAX DX C+: CPT | Mod: TC,PO

## 2022-02-18 PROCEDURE — 74177 CT ABD & PELVIS W/CONTRAST: CPT | Mod: TC,PO

## 2022-02-18 PROCEDURE — 25500020 PHARM REV CODE 255: Mod: PO | Performed by: INTERNAL MEDICINE

## 2022-02-18 RX ADMIN — IOHEXOL 30 ML: 300 INJECTION, SOLUTION INTRAVENOUS at 02:02

## 2022-02-18 RX ADMIN — IOHEXOL 75 ML: 350 INJECTION, SOLUTION INTRAVENOUS at 02:02

## 2022-02-21 ENCOUNTER — OFFICE VISIT (OUTPATIENT)
Dept: HEMATOLOGY/ONCOLOGY | Facility: CLINIC | Age: 52
End: 2022-02-21
Payer: MEDICAID

## 2022-02-21 VITALS
DIASTOLIC BLOOD PRESSURE: 78 MMHG | HEART RATE: 71 BPM | WEIGHT: 228.38 LBS | SYSTOLIC BLOOD PRESSURE: 132 MMHG | TEMPERATURE: 98 F | HEIGHT: 67 IN | BODY MASS INDEX: 35.84 KG/M2

## 2022-02-21 DIAGNOSIS — Z85.038 HISTORY OF COLON CANCER, STAGE II: Primary | ICD-10-CM

## 2022-02-21 PROCEDURE — 3078F DIAST BP <80 MM HG: CPT | Mod: CPTII,,, | Performed by: INTERNAL MEDICINE

## 2022-02-21 PROCEDURE — 99999 PR PBB SHADOW E&M-EST. PATIENT-LVL III: ICD-10-PCS | Mod: PBBFAC,,, | Performed by: INTERNAL MEDICINE

## 2022-02-21 PROCEDURE — 3075F SYST BP GE 130 - 139MM HG: CPT | Mod: CPTII,,, | Performed by: INTERNAL MEDICINE

## 2022-02-21 PROCEDURE — 3075F PR MOST RECENT SYSTOLIC BLOOD PRESS GE 130-139MM HG: ICD-10-PCS | Mod: CPTII,,, | Performed by: INTERNAL MEDICINE

## 2022-02-21 PROCEDURE — 3008F PR BODY MASS INDEX (BMI) DOCUMENTED: ICD-10-PCS | Mod: CPTII,,, | Performed by: INTERNAL MEDICINE

## 2022-02-21 PROCEDURE — 3008F BODY MASS INDEX DOCD: CPT | Mod: CPTII,,, | Performed by: INTERNAL MEDICINE

## 2022-02-21 PROCEDURE — 99999 PR PBB SHADOW E&M-EST. PATIENT-LVL III: CPT | Mod: PBBFAC,,, | Performed by: INTERNAL MEDICINE

## 2022-02-21 PROCEDURE — 1159F PR MEDICATION LIST DOCUMENTED IN MEDICAL RECORD: ICD-10-PCS | Mod: CPTII,,, | Performed by: INTERNAL MEDICINE

## 2022-02-21 PROCEDURE — 99214 PR OFFICE/OUTPT VISIT, EST, LEVL IV, 30-39 MIN: ICD-10-PCS | Mod: S$PBB,,, | Performed by: INTERNAL MEDICINE

## 2022-02-21 PROCEDURE — 99213 OFFICE O/P EST LOW 20 MIN: CPT | Mod: PBBFAC | Performed by: INTERNAL MEDICINE

## 2022-02-21 PROCEDURE — 99214 OFFICE O/P EST MOD 30 MIN: CPT | Mod: S$PBB,,, | Performed by: INTERNAL MEDICINE

## 2022-02-21 PROCEDURE — 1159F MED LIST DOCD IN RCRD: CPT | Mod: CPTII,,, | Performed by: INTERNAL MEDICINE

## 2022-02-21 PROCEDURE — 3078F PR MOST RECENT DIASTOLIC BLOOD PRESSURE < 80 MM HG: ICD-10-PCS | Mod: CPTII,,, | Performed by: INTERNAL MEDICINE

## 2022-02-21 NOTE — PROGRESS NOTES
Subjective:       Patient ID: Leni Henson is a 51 y.o. female.    Chief Complaint: History of colon cancer, stage II  Ms. Leni Henson is a 51-year-old female who has been having left-sided abdominal pain for about 2-3 years along with occasional episodes of blood in the stool.  She finally sought care and underwent a   colonoscopy on 07/25/2018 and that revealed likely malignant completely obstructing tumor in the sigmoid colon, which was biopsied revealing moderately differentiated colon cancer.  She underwent restaging CT scans and was noted to have a 4 mm nodule in the lung and repeat was recommended at 12 months.  She was then taken for sigmoid colectomy, which she underwent on 08/06/2018 and   Pathology from that revealed a pathologic T3 N0 M0, 17 lymph nodes were examined, which were negative.  It was moderately differentiated, no perineural invasion, no lymphovascular invasion, no localized perforation.  All margins were negative     She underwent colonoscopy in 10/18 which was normal and a repeat will be done in 3 years             HPI She comes in to review her CT scans which reveal no evidence of recurrence  She notes issues with cough especially when she lies down      Review of Systems   Constitutional: Negative for appetite change, fatigue and unexpected weight change.   HENT: Negative for mouth sores.    Eyes: Negative for visual disturbance.   Respiratory: Positive for cough. Negative for shortness of breath.    Cardiovascular: Negative for chest pain.   Gastrointestinal: Negative for abdominal pain and diarrhea.   Genitourinary: Negative for frequency.   Musculoskeletal: Negative for back pain.   Integumentary:  Negative for rash.   Neurological: Negative for headaches.   Hematological: Negative for adenopathy.   Psychiatric/Behavioral: The patient is not nervous/anxious.    All other systems reviewed and are negative.        Objective:      Physical Exam  Vitals reviewed.   Constitutional:        Appearance: She is well-developed.   HENT:      Mouth/Throat:      Pharynx: No oropharyngeal exudate.   Cardiovascular:      Rate and Rhythm: Normal rate.      Heart sounds: Normal heart sounds.   Pulmonary:      Effort: Pulmonary effort is normal.      Breath sounds: Normal breath sounds. No wheezing.   Abdominal:      General: Bowel sounds are normal.      Palpations: Abdomen is soft.      Tenderness: There is no abdominal tenderness.   Musculoskeletal:         General: No tenderness.   Lymphadenopathy:      Cervical: No cervical adenopathy.   Skin:     General: Skin is warm and dry.      Findings: No rash.   Neurological:      Mental Status: She is alert and oriented to person, place, and time.      Coordination: Coordination normal.   Psychiatric:         Thought Content: Thought content normal.         Judgment: Judgment normal.           LABS:  WBC   Date Value Ref Range Status   02/18/2022 6.03 3.90 - 12.70 K/uL Final     Hemoglobin   Date Value Ref Range Status   02/18/2022 12.7 12.0 - 16.0 g/dL Final     Hematocrit   Date Value Ref Range Status   02/18/2022 39.5 37.0 - 48.5 % Final     Platelets   Date Value Ref Range Status   02/18/2022 272 150 - 450 K/uL Final     Gran # (ANC)   Date Value Ref Range Status   02/18/2022 2.7 1.8 - 7.7 K/uL Final     Comment:     The ANC is based on a white cell differential from an   automated cell counter. It has not been microscopically   reviewed for the presence of abnormal cells. Clinical   correlation is required.         Chemistry        Component Value Date/Time     02/18/2022 1150    K 3.9 02/18/2022 1150     02/18/2022 1150    CO2 32 (H) 02/18/2022 1150    BUN 5 (L) 02/18/2022 1150    CREATININE 0.59 02/18/2022 1150     02/18/2022 1150        Component Value Date/Time    CALCIUM 9.0 02/18/2022 1150    ALKPHOS 81 02/18/2022 1150    AST 24 02/18/2022 1150    ALT 20 02/18/2022 1150    BILITOT 0.5 02/18/2022 1150    ESTGFRAFRICA >60.0  02/18/2022 1150    EGFRNONAA >60.0 02/18/2022 1150          Assessment:       Problem List Items Addressed This Visit     History of colon cancer, stage II - Primary          Plan:        She is doing well with no evidence of recurrence. Colonoscopy will be on 3/25/2022.  She will return in 1 year with labs and CT scans    Above care plan was discussed with patient and accompanying friend and all questions were addressed to their satisfaction

## 2022-03-22 ENCOUNTER — LAB VISIT (OUTPATIENT)
Dept: PRIMARY CARE CLINIC | Facility: CLINIC | Age: 52
End: 2022-03-22
Payer: MEDICAID

## 2022-03-22 DIAGNOSIS — Z01.812 PRE-PROCEDURE LAB EXAM: ICD-10-CM

## 2022-03-22 LAB
SARS-COV-2 RNA RESP QL NAA+PROBE: NOT DETECTED
SARS-COV-2- CYCLE NUMBER: NORMAL

## 2022-03-22 PROCEDURE — U0005 INFEC AGEN DETEC AMPLI PROBE: HCPCS | Performed by: CLINICAL NURSE SPECIALIST

## 2022-03-22 PROCEDURE — U0003 INFECTIOUS AGENT DETECTION BY NUCLEIC ACID (DNA OR RNA); SEVERE ACUTE RESPIRATORY SYNDROME CORONAVIRUS 2 (SARS-COV-2) (CORONAVIRUS DISEASE [COVID-19]), AMPLIFIED PROBE TECHNIQUE, MAKING USE OF HIGH THROUGHPUT TECHNOLOGIES AS DESCRIBED BY CMS-2020-01-R: HCPCS | Performed by: CLINICAL NURSE SPECIALIST

## 2022-03-24 ENCOUNTER — ANESTHESIA EVENT (OUTPATIENT)
Dept: ENDOSCOPY | Facility: HOSPITAL | Age: 52
End: 2022-03-24
Payer: MEDICAID

## 2022-03-25 ENCOUNTER — HOSPITAL ENCOUNTER (OUTPATIENT)
Facility: HOSPITAL | Age: 52
Discharge: HOME | End: 2022-03-25
Attending: COLON & RECTAL SURGERY | Admitting: COLON & RECTAL SURGERY
Payer: MEDICAID

## 2022-03-25 ENCOUNTER — ANESTHESIA (OUTPATIENT)
Dept: ENDOSCOPY | Facility: HOSPITAL | Age: 52
End: 2022-03-25
Payer: MEDICAID

## 2022-03-25 VITALS
TEMPERATURE: 98 F | HEIGHT: 66 IN | BODY MASS INDEX: 36.8 KG/M2 | RESPIRATION RATE: 18 BRPM | HEART RATE: 51 BPM | WEIGHT: 229 LBS | SYSTOLIC BLOOD PRESSURE: 117 MMHG | DIASTOLIC BLOOD PRESSURE: 71 MMHG | OXYGEN SATURATION: 100 %

## 2022-03-25 DIAGNOSIS — Z85.038 HISTORY OF COLON CANCER: Primary | ICD-10-CM

## 2022-03-25 PROCEDURE — 45378 DIAGNOSTIC COLONOSCOPY: CPT | Mod: ,,, | Performed by: COLON & RECTAL SURGERY

## 2022-03-25 PROCEDURE — E9220 PRA ENDO ANESTHESIA: ICD-10-PCS | Mod: ,,, | Performed by: NURSE ANESTHETIST, CERTIFIED REGISTERED

## 2022-03-25 PROCEDURE — 00812 ANES LWR INTST SCR COLSC: CPT | Performed by: COLON & RECTAL SURGERY

## 2022-03-25 PROCEDURE — 25000003 PHARM REV CODE 250: Performed by: COLON & RECTAL SURGERY

## 2022-03-25 PROCEDURE — 37000008 HC ANESTHESIA 1ST 15 MINUTES: Performed by: COLON & RECTAL SURGERY

## 2022-03-25 PROCEDURE — G0105 COLORECTAL SCRN; HI RISK IND: HCPCS | Performed by: COLON & RECTAL SURGERY

## 2022-03-25 PROCEDURE — 63600175 PHARM REV CODE 636 W HCPCS: Performed by: NURSE ANESTHETIST, CERTIFIED REGISTERED

## 2022-03-25 PROCEDURE — 25000003 PHARM REV CODE 250: Performed by: NURSE ANESTHETIST, CERTIFIED REGISTERED

## 2022-03-25 PROCEDURE — 45378 PR COLONOSCOPY,DIAGNOSTIC: ICD-10-PCS | Mod: ,,, | Performed by: COLON & RECTAL SURGERY

## 2022-03-25 PROCEDURE — 37000009 HC ANESTHESIA EA ADD 15 MINS: Performed by: COLON & RECTAL SURGERY

## 2022-03-25 PROCEDURE — E9220 PRA ENDO ANESTHESIA: HCPCS | Mod: ,,, | Performed by: NURSE ANESTHETIST, CERTIFIED REGISTERED

## 2022-03-25 RX ORDER — LIDOCAINE HCL/PF 100 MG/5ML
SYRINGE (ML) INTRAVENOUS
Status: DISCONTINUED | OUTPATIENT
Start: 2022-03-25 | End: 2022-03-25

## 2022-03-25 RX ORDER — PROPOFOL 10 MG/ML
VIAL (ML) INTRAVENOUS
Status: DISCONTINUED | OUTPATIENT
Start: 2022-03-25 | End: 2022-03-25

## 2022-03-25 RX ORDER — PROPOFOL 10 MG/ML
VIAL (ML) INTRAVENOUS CONTINUOUS PRN
Status: DISCONTINUED | OUTPATIENT
Start: 2022-03-25 | End: 2022-03-25

## 2022-03-25 RX ORDER — SODIUM CHLORIDE 9 MG/ML
INJECTION, SOLUTION INTRAVENOUS CONTINUOUS
Status: DISCONTINUED | OUTPATIENT
Start: 2022-03-25 | End: 2022-03-25 | Stop reason: HOSPADM

## 2022-03-25 RX ORDER — SODIUM CHLORIDE 9 MG/ML
INJECTION, SOLUTION INTRAVENOUS CONTINUOUS PRN
Status: DISCONTINUED | OUTPATIENT
Start: 2022-03-25 | End: 2022-03-25

## 2022-03-25 RX ADMIN — SODIUM CHLORIDE: 0.9 INJECTION, SOLUTION INTRAVENOUS at 10:03

## 2022-03-25 RX ADMIN — Medication 100 MG: at 11:03

## 2022-03-25 RX ADMIN — PROPOFOL 30 MG: 10 INJECTION, EMULSION INTRAVENOUS at 11:03

## 2022-03-25 RX ADMIN — PROPOFOL 150 MCG/KG/MIN: 10 INJECTION, EMULSION INTRAVENOUS at 11:03

## 2022-03-25 RX ADMIN — SODIUM CHLORIDE: 0.9 INJECTION, SOLUTION INTRAVENOUS at 11:03

## 2022-03-25 RX ADMIN — PROPOFOL 100 MG: 10 INJECTION, EMULSION INTRAVENOUS at 11:03

## 2022-03-25 NOTE — TRANSFER OF CARE
"Anesthesia Transfer of Care Note    Patient: Leni Henson    Procedure(s) Performed: Procedure(s) (LRB):  COLONOSCOPY (N/A)    Patient location: PACU    Anesthesia Type: general    Transport from OR: Transported from OR on 100% O2 by closed face mask with adequate spontaneous ventilation    Post pain: adequate analgesia    Post assessment: no apparent anesthetic complications and tolerated procedure well    Post vital signs: stable    Level of consciousness: sedated and responds to stimulation    Nausea/Vomiting: no nausea/vomiting    Complications: none    Transfer of care protocol was followed      Last vitals:   Visit Vitals  /70 (BP Location: Left arm, Patient Position: Lying)   Pulse 69   Temp 36.8 °C (98.2 °F)   Resp 18   Ht 5' 6" (1.676 m)   Wt 103.9 kg (229 lb)   SpO2 99%   Breastfeeding No   BMI 36.96 kg/m²     "

## 2022-03-25 NOTE — PROVATION PATIENT INSTRUCTIONS
Discharge Summary/Instructions after an Endoscopic Procedure  Patient Name: Leni Henson  Patient MRN: [id number]  Patient YOB: 1970 Friday, March 25, 2022  Thomas Kim MD  Dear patient,  As a result of recent federal legislation (The Federal Cures Act), you may   receive lab or pathology results from your procedure in your [facility]   account before your physician is able to contact you. Your physician or   their representative will relay the results to you with their   recommendations at their soonest availability.  Thank you,  RESTRICTIONS:  During your procedure today, you received medications for sedation.  These   medications may affect your judgment, balance and coordination.  Therefore,   for 24 hours, you have the following restrictions:   - DO NOT drive a car, operate machinery, make legal/financial decisions,   sign important papers or drink alcohol.    ACTIVITY:  Today: no heavy lifting, straining or running due to procedural   sedation/anesthesia.  The following day: return to full activity including work.  DIET:  Eat and drink normally unless instructed otherwise.     TREATMENT FOR COMMON SIDE EFFECTS:  - Mild abdominal pain, nausea, belching, bloating or excessive gas:  rest,   eat lightly and use a heating pad.  - Sore Throat: treat with throat lozenges and/or gargle with warm salt   water.  - Because air was used during the procedure, expelling large amounts of air   from your rectum or belching is normal.  - If a bowel prep was taken, you may not have a bowel movement for 1-3 days.    This is normal.  SYMPTOMS TO WATCH FOR AND REPORT TO YOUR PHYSICIAN:  1. Abdominal pain or bloating, other than gas cramps.  2. Chest pain.  3. Back pain.  4. Signs of infection such as: chills or fever occurring within 24 hours   after the procedure.  5. Rectal bleeding, which would show as bright red, maroon, or black stools.   (A tablespoon of blood from the rectum is not serious, especially  if   hemorrhoids are present.)  6. Vomiting.  7. Weakness or dizziness.  GO DIRECTLY TO THE NEAREST EMERGENCY ROOM IF YOU HAVE ANY OF THE FOLLOWING:      Difficulty breathing              Chills and/or fever over 101 F   Persistent vomiting and/or vomiting blood   Severe abdominal pain   Severe chest pain   Black, tarry stools   Bleeding- more than one tablespoon   Any other symptom or condition that you feel may need urgent attention  Your doctor recommends these additional instructions:  If any biopsies were taken, your doctors clinic will contact you in 1 to 2   weeks with any results.  - Discharge patient to home (ambulatory).   - Resume previous diet.   - Continue present medications.   - Repeat colonoscopy in 5 years for surveillance.  For questions, problems or results please call your physician - Thomas Kim MD at Work:  [phone].  [facility] [place] [facility], EMERGENCY ROOM PHONE NUMBER: [phone]  IF A COMPLICATION OR EMERGENCY SITUATION ARISES AND YOU ARE UNABLE TO REACH   YOUR PHYSICIAN - GO DIRECTLY TO THE EMERGENCY ROOM.  Thomas Kim MD  3/25/2022 11:47:28 AM  This report has been verified and signed electronically.  Dear patient,  As a result of recent federal legislation (The Federal Cures Act), you may   receive lab or pathology results from your procedure in your [facility]   account before your physician is able to contact you. Your physician or   their representative will relay the results to you with their   recommendations at their soonest availability.  Thank you,  PROVATION

## 2022-03-25 NOTE — ANESTHESIA POSTPROCEDURE EVALUATION
Anesthesia Post Evaluation    Patient: Leni Henson    Procedure(s) Performed: Procedure(s) (LRB):  COLONOSCOPY (N/A)    Final Anesthesia Type: general      Patient location during evaluation: GI PACU  Patient participation: Yes- Able to Participate  Level of consciousness: awake and alert and oriented  Post-procedure vital signs: reviewed and stable  Pain management: adequate  Airway patency: patent    PONV status at discharge: No PONV  Anesthetic complications: no      Cardiovascular status: blood pressure returned to baseline and hemodynamically stable  Respiratory status: unassisted, spontaneous ventilation and room air  Hydration status: euvolemic  Follow-up not needed.          Vitals Value Taken Time   /71 03/25/22 1219   Temp 36.7 °C (98 °F) 03/25/22 1148   Pulse 51 03/25/22 1219   Resp 18 03/25/22 1219   SpO2 100 % 03/25/22 1219         Event Time   Out of Recovery 12:25:37         Pain/Arron Score: Arron Score: 10 (3/25/2022 12:20 PM)

## 2022-03-25 NOTE — H&P
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 Pertinent negatives include normal vaginal mucosa found on vaginal inspection. There was evidence of a prior end-to-end colo-rectal anastomosis in  the distal rectum. This was patent and was characterized by healthy appearing mucosa. The anastomosis was traversed. This was biopsied with a cold forceps for histology. (Benign colonic mucosa). The exam was otherwise without abnormality on direct and retroflexion views.       Past Medical History:   Diagnosis Date    Malignant neoplasm of splenic flexure 7/27/2018     Sedation Problems: NO  Family History   Problem Relation Age of Onset    Breast cancer Mother 70    Breast cancer Sister 48    Prostate cancer Father     Colon cancer Brother 62     Fam Hx of Sedation Problems: NO  Social History     Socioeconomic History    Marital status: Single   Tobacco Use    Smoking status: Never Smoker    Smokeless tobacco: Never Used   Substance and Sexual Activity    Alcohol use: Yes     Comment: social    Drug use: No    Sexual activity: Yes     Partners: Male       Review of Systems - Negative except   Respiratory ROS: no dyspnea  Cardiovascular ROS: no exertional chest pain  Gastrointestinal ROS: NO abdominal discomfort,  NO rectal bleeding  Musculoskeletal ROS: no muscular pain  Neurological ROS: no recent stroke    Physical Exam:  /70 (BP Location: Left arm, Patient Position: Lying)   Pulse 69   Temp 98.2 °F (36.8 °C)   Resp 18   Ht 5' 6" (1.676 m)   Wt 103.9 kg (229 lb)   SpO2 99%   Breastfeeding No   BMI 36.96 kg/m²   General: no distress  Head: normocephalic  Mallampati Score   Neck: supple, symmetrical, trachea midline  Lungs:  clear to " auscultation bilaterally and normal respiratory effort  Heart: regular rate and rhythm and no murmur  Abdomen: soft, non-tender non-distented; bowel sounds normal; no masses,  no organomegaly  Extremities: no cyanosis or edema, or clubbing    ASA:  II    PLAN  COLONOSCOPY.    SedationPlan :MAC    The details of the procedure, the possible need for biopsy or polypectomy and the potential risks including bleeding, perforation, missed polyps were discussed in detail.

## 2022-03-25 NOTE — ANESTHESIA PREPROCEDURE EVALUATION
03/25/2022  Leni Henson is a 51 y.o., female.  Past Medical History:   Diagnosis Date    Malignant neoplasm of splenic flexure 7/27/2018     Past Surgical History:   Procedure Laterality Date    CHOLECYSTECTOMY  2015    laparoscopic    COLECTOMY  2018    COLONOSCOPY N/A 7/25/2018    Procedure: COLONOSCOPY;  Surgeon: Rashad Stahl MD;  Location: [facility] ENDO;  Service: Endoscopy;  Laterality: N/A;    COLONOSCOPY N/A 10/3/2018    Procedure: COLONOSCOPY with Julio;  Surgeon: Thomas Kim MD;  Location: [facility] (4TH FLR);  Service: Endoscopy;  Laterality: N/A;  in 6 weeks please    HYSTERECTOMY  2010    LAPAROSCOPIC SIGMOIDECTOMY N/A 8/6/2018    Procedure: Lap sigmoid colectomy;  Surgeon: Thomas Kim MD;  [facility];  Service: Colon and Rectal;  Laterality: N/A;    TUBAL LIGATION  2000s         Pre-op Assessment    I have reviewed the Patient Summary Reports.     I have reviewed the Nursing Notes.    I have reviewed the Medications.     Review of Systems  Anesthesia Hx:  No problems with previous Anesthesia  Neg history of prior surgery. Denies Family Hx of Anesthesia complications.   Denies Personal Hx of Anesthesia complications.   Hematology/Oncology:  Hematology Normal   Oncology Normal     EENT/Dental:EENT/Dental Normal   Cardiovascular:  Cardiovascular Normal     Pulmonary:  Pulmonary Normal    Renal/:  Renal/ Normal     Hepatic/GI:  Hepatic/GI Normal    Musculoskeletal:  Musculoskeletal Normal    Neurological:  Neurology Normal    Endocrine:  Endocrine Normal    Dermatological:  Skin Normal    Psych:  Psychiatric Normal           Physical Exam  General: Well nourished    Airway:  Mallampati: II / II  Mouth Opening: Normal  TM Distance: Normal  Tongue: Normal  Neck ROM: Normal ROM    Dental:  Intact    Chest/Lungs:  Clear to auscultation, Normal  Respiratory Rate    Heart:  Rate: Normal  Rhythm: Regular Rhythm  Sounds: Normal        Anesthesia Plan  Type of Anesthesia, risks & benefits discussed:    Anesthesia Type: Gen Natural Airway  Intra-op Monitoring Plan: Standard ASA Monitors  Induction:  IV  Informed Consent: Informed consent signed with the Patient and all parties understand the risks and agree with anesthesia plan.  All questions answered.   ASA Score: 2  Day of Surgery Review of History & Physical: H&P Update referred to the surgeon/provider.    Ready For Surgery From Anesthesia Perspective.     .

## 2023-02-24 DIAGNOSIS — Z85.038 HISTORY OF COLON CANCER, STAGE II: Primary | ICD-10-CM

## 2023-04-05 ENCOUNTER — HOSPITAL ENCOUNTER (OUTPATIENT)
Dept: RADIOLOGY | Facility: HOSPITAL | Age: 53
Discharge: HOME | End: 2023-04-05
Attending: INTERNAL MEDICINE
Payer: MEDICAID

## 2023-04-05 DIAGNOSIS — Z85.038 HISTORY OF COLON CANCER, STAGE II: ICD-10-CM

## 2023-04-05 PROCEDURE — 25500020 PHARM REV CODE 255: Performed by: INTERNAL MEDICINE

## 2023-04-05 PROCEDURE — 71260 CT THORAX DX C+: CPT | Mod: 26,,, | Performed by: RADIOLOGY

## 2023-04-05 PROCEDURE — 71260 CT CHEST ABDOMEN PELVIS WITH CONTRAST (XPD): ICD-10-PCS | Mod: 26,,, | Performed by: RADIOLOGY

## 2023-04-05 PROCEDURE — A9698 NON-RAD CONTRAST MATERIALNOC: HCPCS | Performed by: INTERNAL MEDICINE

## 2023-04-05 PROCEDURE — 74177 CT ABD & PELVIS W/CONTRAST: CPT | Mod: TC

## 2023-04-05 PROCEDURE — 74177 CT CHEST ABDOMEN PELVIS WITH CONTRAST (XPD): ICD-10-PCS | Mod: 26,,, | Performed by: RADIOLOGY

## 2023-04-05 PROCEDURE — 71260 CT THORAX DX C+: CPT | Mod: TC

## 2023-04-05 PROCEDURE — 74177 CT ABD & PELVIS W/CONTRAST: CPT | Mod: 26,,, | Performed by: RADIOLOGY

## 2023-04-05 RX ADMIN — BARIUM SULFATE 450 ML: 20 SUSPENSION ORAL at 09:04

## 2023-04-05 RX ADMIN — IOHEXOL 100 ML: 350 INJECTION, SOLUTION INTRAVENOUS at 09:04

## 2023-04-17 ENCOUNTER — OFFICE VISIT (OUTPATIENT)
Dept: HEMATOLOGY/ONCOLOGY | Facility: CLINIC | Age: 53
End: 2023-04-17
Payer: MEDICAID

## 2023-04-17 VITALS
BODY MASS INDEX: 37.68 KG/M2 | OXYGEN SATURATION: 98 % | WEIGHT: 233.44 LBS | DIASTOLIC BLOOD PRESSURE: 78 MMHG | RESPIRATION RATE: 18 BRPM | HEART RATE: 68 BPM | SYSTOLIC BLOOD PRESSURE: 129 MMHG | TEMPERATURE: 98 F

## 2023-04-17 DIAGNOSIS — Z85.038 HISTORY OF COLON CANCER, STAGE II: Primary | ICD-10-CM

## 2023-04-17 PROBLEM — D50.9 IRON DEFICIENCY ANEMIA: Status: RESOLVED | Noted: 2019-02-22 | Resolved: 2023-04-17

## 2023-04-17 PROCEDURE — 99999 PR PBB SHADOW E&M-EST. PATIENT-LVL III: ICD-10-PCS | Mod: PBBFAC,,, | Performed by: INTERNAL MEDICINE

## 2023-04-17 PROCEDURE — 3078F DIAST BP <80 MM HG: CPT | Mod: CPTII,,, | Performed by: INTERNAL MEDICINE

## 2023-04-17 PROCEDURE — 99213 OFFICE O/P EST LOW 20 MIN: CPT | Mod: PBBFAC | Performed by: INTERNAL MEDICINE

## 2023-04-17 PROCEDURE — 3074F SYST BP LT 130 MM HG: CPT | Mod: CPTII,,, | Performed by: INTERNAL MEDICINE

## 2023-04-17 PROCEDURE — 99213 OFFICE O/P EST LOW 20 MIN: CPT | Mod: S$PBB,,, | Performed by: INTERNAL MEDICINE

## 2023-04-17 PROCEDURE — 3008F BODY MASS INDEX DOCD: CPT | Mod: CPTII,,, | Performed by: INTERNAL MEDICINE

## 2023-04-17 PROCEDURE — 99999 PR PBB SHADOW E&M-EST. PATIENT-LVL III: CPT | Mod: PBBFAC,,, | Performed by: INTERNAL MEDICINE

## 2023-04-17 PROCEDURE — 3074F PR MOST RECENT SYSTOLIC BLOOD PRESSURE < 130 MM HG: ICD-10-PCS | Mod: CPTII,,, | Performed by: INTERNAL MEDICINE

## 2023-04-17 PROCEDURE — 99213 PR OFFICE/OUTPT VISIT, EST, LEVL III, 20-29 MIN: ICD-10-PCS | Mod: S$PBB,,, | Performed by: INTERNAL MEDICINE

## 2023-04-17 PROCEDURE — 3078F PR MOST RECENT DIASTOLIC BLOOD PRESSURE < 80 MM HG: ICD-10-PCS | Mod: CPTII,,, | Performed by: INTERNAL MEDICINE

## 2023-04-17 PROCEDURE — 3008F PR BODY MASS INDEX (BMI) DOCUMENTED: ICD-10-PCS | Mod: CPTII,,, | Performed by: INTERNAL MEDICINE

## 2023-04-17 NOTE — PROGRESS NOTES
"Subjective     Patient ID: Leni Henson is a 52 y.o. female.    Chief Complaint: History of colon cancer, stage II  Ms. Leni Henson is a 52-year-old female who has been having left-sided abdominal pain for about 2-3 years along with occasional episodes of blood in the stool.  She finally sought care and underwent a   colonoscopy on 07/25/2018 and that revealed likely malignant completely obstructing tumor in the sigmoid colon, which was biopsied revealing moderately differentiated colon cancer.  She underwent restaging CT scans and was noted to have a 4 mm nodule in the lung and repeat was recommended at 12 months.  She was then taken for sigmoid colectomy, which she underwent on 08/06/2018 and   Pathology from that revealed a pathologic T3 N0 M0, 17 lymph nodes were examined, which were negative.  It was moderately differentiated, no perineural invasion, no lymphovascular invasion, no localized perforation.  All margins were negative     She underwent colonoscopy in March 2022 which revealed "Patent end-to-end colo-rectal anastomosis, characterized by healthy appearing mucosa.   Tortuous colon" Repeat will be in 5 years    HPIShe comes in to review her Ct scans from 4/5/2023 which reveals "No significant abnormality seen.  No evidence of recurrent or residual neoplasm"    Review of Systems   Constitutional:  Negative for appetite change, fatigue and unexpected weight change.   HENT:  Negative for mouth sores.    Eyes:  Negative for visual disturbance.   Respiratory:  Negative for cough and shortness of breath.    Cardiovascular:  Negative for chest pain.   Gastrointestinal:  Negative for abdominal pain and diarrhea.   Genitourinary:  Negative for frequency.   Musculoskeletal:  Negative for back pain.   Integumentary:  Negative for rash.   Neurological:  Negative for headaches.   Hematological:  Negative for adenopathy.   Psychiatric/Behavioral:  The patient is not nervous/anxious.    All other systems " reviewed and are negative.       Objective     Physical Exam  Vitals reviewed.   Constitutional:       Appearance: She is well-developed.   HENT:      Mouth/Throat:      Pharynx: No oropharyngeal exudate.   Cardiovascular:      Rate and Rhythm: Normal rate.      Heart sounds: Normal heart sounds.   Pulmonary:      Effort: Pulmonary effort is normal.      Breath sounds: Normal breath sounds. No wheezing.   Abdominal:      General: Bowel sounds are normal.      Palpations: Abdomen is soft.      Tenderness: There is no abdominal tenderness.   Musculoskeletal:         General: No tenderness.   Lymphadenopathy:      Cervical: No cervical adenopathy.   Skin:     General: Skin is warm and dry.      Findings: No rash.   Neurological:      Mental Status: She is alert and oriented to person, place, and time.      Coordination: Coordination normal.   Psychiatric:         Thought Content: Thought content normal.         Judgment: Judgment normal.            LABS:  WBC   Date Value Ref Range Status   04/05/2023 5.06 3.90 - 12.70 K/uL Final     Hemoglobin   Date Value Ref Range Status   04/05/2023 13.0 12.0 - 16.0 g/dL Final     Hematocrit   Date Value Ref Range Status   04/05/2023 39.6 37.0 - 48.5 % Final     Platelets   Date Value Ref Range Status   04/05/2023 301 150 - 450 K/uL Final     Gran # (ANC)   Date Value Ref Range Status   04/05/2023 2.7 1.8 - 7.7 K/uL Final     Comment:     The ANC is based on a white cell differential from an   automated cell counter. It has not been microscopically   reviewed for the presence of abnormal cells. Clinical   correlation is required.         Chemistry        Component Value Date/Time     04/05/2023 0845    K 3.4 (L) 04/05/2023 0845     04/05/2023 0845    CO2 25 04/05/2023 0845    BUN 8 04/05/2023 0845    CREATININE 0.8 04/05/2023 0845     (H) 04/05/2023 0845        Component Value Date/Time    CALCIUM 8.9 04/05/2023 0845    ALKPHOS 87 04/05/2023 0845    AST 17  04/05/2023 0845    ALT 13 04/05/2023 0845    BILITOT 0.5 04/05/2023 0845    ESTGFRAFRICA >60.0 02/18/2022 1150    EGFRNONAA >60.0 02/18/2022 1150        CEA: 2  Assessment and Plan     Problem List Items Addressed This Visit       History of colon cancer, stage II - Primary         She is doing well cliniclaly, now 5 years since diagnosis with no evidence of recurrence. She does not need routine surveillance CT scans  Next colonoscopy will be in 2027, will reach out to PCP to schedule     Above care plan was discussed with patient and all questions were addressed to her satisfaction

## 2024-01-26 ENCOUNTER — HOSPITAL ENCOUNTER (EMERGENCY)
Facility: HOSPITAL | Age: 54
Discharge: HOME | End: 2024-01-26
Attending: EMERGENCY MEDICINE
Payer: MEDICAID

## 2024-01-26 VITALS
TEMPERATURE: 98 F | BODY MASS INDEX: 26.52 KG/M2 | RESPIRATION RATE: 16 BRPM | DIASTOLIC BLOOD PRESSURE: 77 MMHG | OXYGEN SATURATION: 97 % | HEIGHT: 66 IN | HEART RATE: 56 BPM | SYSTOLIC BLOOD PRESSURE: 113 MMHG | WEIGHT: 165 LBS

## 2024-01-26 DIAGNOSIS — R07.89 CHEST WALL TENDERNESS: Primary | ICD-10-CM

## 2024-01-26 DIAGNOSIS — R07.9 CHEST PAIN: ICD-10-CM

## 2024-01-26 LAB
ALBUMIN SERPL BCP-MCNC: 3.6 G/DL (ref 3.5–5.2)
ALP SERPL-CCNC: 77 U/L (ref 55–135)
ALT SERPL W/O P-5'-P-CCNC: 10 U/L (ref 10–44)
ANION GAP SERPL CALC-SCNC: 6 MMOL/L (ref 8–16)
AST SERPL-CCNC: 13 U/L (ref 10–40)
BASOPHILS # BLD AUTO: 0.02 K/UL (ref 0–0.2)
BASOPHILS NFR BLD: 0.3 % (ref 0–1.9)
BILIRUB SERPL-MCNC: 0.5 MG/DL (ref 0.1–1)
BNP SERPL-MCNC: 18 PG/ML (ref 0–99)
BUN SERPL-MCNC: 8 MG/DL (ref 6–20)
CALCIUM SERPL-MCNC: 9 MG/DL (ref 8.7–10.5)
CHLORIDE SERPL-SCNC: 108 MMOL/L (ref 95–110)
CO2 SERPL-SCNC: 23 MMOL/L (ref 23–29)
CREAT SERPL-MCNC: 0.7 MG/DL (ref 0.5–1.4)
D DIMER PPP IA.FEU-MCNC: 0.3 MG/L FEU
DIFFERENTIAL METHOD BLD: ABNORMAL
EOSINOPHIL # BLD AUTO: 0.1 K/UL (ref 0–0.5)
EOSINOPHIL NFR BLD: 1.1 % (ref 0–8)
ERYTHROCYTE [DISTWIDTH] IN BLOOD BY AUTOMATED COUNT: 12.6 % (ref 11.5–14.5)
EST. GFR  (NO RACE VARIABLE): >60 ML/MIN/1.73 M^2
GLUCOSE SERPL-MCNC: 92 MG/DL (ref 70–110)
HCT VFR BLD AUTO: 38 % (ref 37–48.5)
HGB BLD-MCNC: 12.4 G/DL (ref 12–16)
IMM GRANULOCYTES # BLD AUTO: 0.01 K/UL (ref 0–0.04)
IMM GRANULOCYTES NFR BLD AUTO: 0.2 % (ref 0–0.5)
LYMPHOCYTES # BLD AUTO: 2.5 K/UL (ref 1–4.8)
LYMPHOCYTES NFR BLD: 39.5 % (ref 18–48)
MCH RBC QN AUTO: 28.1 PG (ref 27–31)
MCHC RBC AUTO-ENTMCNC: 32.6 G/DL (ref 32–36)
MCV RBC AUTO: 86 FL (ref 82–98)
MONOCYTES # BLD AUTO: 0.5 K/UL (ref 0.3–1)
MONOCYTES NFR BLD: 7.6 % (ref 4–15)
NEUTROPHILS # BLD AUTO: 3.3 K/UL (ref 1.8–7.7)
NEUTROPHILS NFR BLD: 51.3 % (ref 38–73)
NRBC BLD-RTO: 0 /100 WBC
PLATELET # BLD AUTO: 242 K/UL (ref 150–450)
PMV BLD AUTO: 8.9 FL (ref 9.2–12.9)
POTASSIUM SERPL-SCNC: 3.8 MMOL/L (ref 3.5–5.1)
PROT SERPL-MCNC: 7.4 G/DL (ref 6–8.4)
RBC # BLD AUTO: 4.42 M/UL (ref 4–5.4)
SODIUM SERPL-SCNC: 137 MMOL/L (ref 136–145)
TROPONIN I SERPL DL<=0.01 NG/ML-MCNC: <0.006 NG/ML (ref 0–0.03)
TROPONIN I SERPL DL<=0.01 NG/ML-MCNC: <0.006 NG/ML (ref 0–0.03)
WBC # BLD AUTO: 6.33 K/UL (ref 3.9–12.7)

## 2024-01-26 PROCEDURE — 80053 COMPREHEN METABOLIC PANEL: CPT | Performed by: PHYSICIAN ASSISTANT

## 2024-01-26 PROCEDURE — 85379 FIBRIN DEGRADATION QUANT: CPT | Performed by: PHYSICIAN ASSISTANT

## 2024-01-26 PROCEDURE — 85025 COMPLETE CBC W/AUTO DIFF WBC: CPT | Performed by: PHYSICIAN ASSISTANT

## 2024-01-26 PROCEDURE — 99285 EMERGENCY DEPT VISIT HI MDM: CPT | Mod: 25

## 2024-01-26 PROCEDURE — 83880 ASSAY OF NATRIURETIC PEPTIDE: CPT | Performed by: PHYSICIAN ASSISTANT

## 2024-01-26 PROCEDURE — 25000003 PHARM REV CODE 250: Performed by: PHYSICIAN ASSISTANT

## 2024-01-26 PROCEDURE — 93005 ELECTROCARDIOGRAM TRACING: CPT

## 2024-01-26 PROCEDURE — 84484 ASSAY OF TROPONIN QUANT: CPT | Mod: 91 | Performed by: PHYSICIAN ASSISTANT

## 2024-01-26 PROCEDURE — 93010 ELECTROCARDIOGRAM REPORT: CPT | Mod: ,,, | Performed by: INTERNAL MEDICINE

## 2024-01-26 RX ORDER — ALUMINUM HYDROXIDE, MAGNESIUM HYDROXIDE, AND SIMETHICONE 1200; 120; 1200 MG/30ML; MG/30ML; MG/30ML
30 SUSPENSION ORAL ONCE
Status: COMPLETED | OUTPATIENT
Start: 2024-01-26 | End: 2024-01-26

## 2024-01-26 RX ORDER — ASPIRIN 325 MG
325 TABLET ORAL
Status: COMPLETED | OUTPATIENT
Start: 2024-01-26 | End: 2024-01-26

## 2024-01-26 RX ORDER — LIDOCAINE HYDROCHLORIDE 20 MG/ML
15 SOLUTION OROPHARYNGEAL ONCE
Status: COMPLETED | OUTPATIENT
Start: 2024-01-26 | End: 2024-01-26

## 2024-01-26 RX ORDER — FAMOTIDINE 20 MG/1
20 TABLET, FILM COATED ORAL EVERY 12 HOURS
Qty: 60 TABLET | Refills: 0 | Status: SHIPPED | OUTPATIENT
Start: 2024-01-26 | End: 2024-02-25

## 2024-01-26 RX ADMIN — ALUMINUM HYDROXIDE, MAGNESIUM HYDROXIDE, AND SIMETHICONE 30 ML: 200; 200; 20 SUSPENSION ORAL at 11:01

## 2024-01-26 RX ADMIN — LIDOCAINE HYDROCHLORIDE 15 ML: 20 SOLUTION OROPHARYNGEAL at 11:01

## 2024-01-26 RX ADMIN — ASPIRIN 325 MG ORAL TABLET 325 MG: 325 PILL ORAL at 10:01

## 2024-01-26 NOTE — EKG INTERPRETATIONS - EMERGENCY DEPT.
Independently interpreted by MD:  Rate 56, NSR, no stemi, no ectopy, no hypertrophy, sinus bradycardia, low voltage

## 2024-01-26 NOTE — DISCHARGE INSTRUCTIONS
Start pepcid every 12 hours for the next 4 weeks for treatment of acid reflux.   You can try over-the-counter fast acting antacids such as Tums, Maalox, or Mylanta as needed.  Avoid foods that can exacerbate your acid reflux such as spicy foods, tomato sauce, peppermint, caffeine, garlic, onions, alcohol, and tobacco use.  Sit up for 2 hours after your last meal before lying down.  Read following resources for additional information.  Follow-up with PCP.  Return to the emergency department for new worsening symptoms.  No future appointments.  Imaging Results              X-Ray Chest PA And Lateral (Final result)  Result time 01/26/24 11:13:49      Final result by Chato Monet MD (01/26/24 11:13:49)                   Impression:      No acute cardiopulmonary disease.      Electronically signed by: Chato Monet MD  Date:    01/26/2024  Time:    11:13               Narrative:    EXAMINATION:  XR CHEST PA AND LATERAL    CLINICAL HISTORY:  Chest pain, unspecified    TECHNIQUE:  PA and lateral views of the chest were performed.    COMPARISON:  11/13/2017    FINDINGS:  The heart size and mediastinal contour are normal.  Lungs are expanded and clear.  No pleural fluid detected.  Skeletal structures are intact.

## 2024-01-26 NOTE — ED PROVIDER NOTES
"Encounter Date: 1/26/2024       History     Chief Complaint   Patient presents with    Chest Pain     C/o chest pain x2 days, denies cardiac hx, also c/o HA     10:18 AM  Patient is a 53-year-old female with a history colon cancer status post resection, in remission who presents to [facility] ED with chest pain.    She denies any injury or trauma.  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  She states yesterday while cooking she had acute onset of left sternal border chest pain that has been nonradiating.  Describes it as tightness and as if something is "sitting on my chest".  She states that she had acute, worsening pain while getting dressed this morning and while at work while work walking to the office.  She has never had a eructation, abdominal pain, nausea, vomiting, cough or fever.  She has no difficulty breathing but notices her pain is worse with deep inspiration.  Currently her pain is 8/10.  She denies hormone use, tobacco use, recent surgery, recent travel, history of DVT.  She had a resection for colon cancer about 5 years ago and has been in remission.  She has chronic left ankle pain for about 1 year, but denies any lower extremity edema or calf tenderness.        Review of patient's allergies indicates:   Allergen Reactions    Adhesive Other (See Comments) and Rash     "burn"  "burn"     Past Medical History:   Diagnosis Date    Malignant neoplasm of splenic flexure 7/27/2018     Past Surgical History:   Procedure Laterality Date    CHOLECYSTECTOMY  2015    laparoscopic    COLECTOMY  2018    COLONOSCOPY N/A 7/25/2018    Procedure: COLONOSCOPY;  Surgeon: Rashad Stahl MD;  Location: [facility];  Service: Endoscopy;  Laterality: N/A;    COLONOSCOPY N/A 10/3/2018    Procedure: COLONOSCOPY with Julio;  Surgeon: Thomas Kim MD;  Location: [facility] ([address] [place] [address]);  Service: Endoscopy;  Laterality: N/A;  in 6 weeks please    COLONOSCOPY N/A 3/25/2022    Procedure: COLONOSCOPY;  Surgeon: SARAHI Figueroa " MD Julio;  Location: [facility] (4TH FLR);  Service: Endoscopy;  Laterality: N/A;  vaccinated    HYSTERECTOMY  2010    LAPAROSCOPIC SIGMOIDECTOMY N/A 8/6/2018    Procedure: Lap sigmoid colectomy;  Surgeon: Thomas Kim MD;  Location: [facility] OR 2ND FLR;  Service: Colon and Rectal;  Laterality: N/A;    TUBAL LIGATION  2000s     Family History   Problem Relation Age of Onset    Breast cancer Mother 70    Breast cancer Sister 48    Prostate cancer Father     Colon cancer Brother 62     Social History     Tobacco Use    Smoking status: Never    Smokeless tobacco: Never   Substance Use Topics    Alcohol use: Yes     Comment: social    Drug use: No     Review of Systems   Constitutional:  Negative for activity change, appetite change, chills and fever.   HENT:  Negative for sore throat.    Respiratory:  Negative for shortness of breath.    Cardiovascular:  Positive for chest pain.   Gastrointestinal:  Negative for abdominal pain, diarrhea, nausea and vomiting.   Genitourinary:  Negative for dysuria.   Musculoskeletal:  Positive for arthralgias. Negative for back pain.   Skin:  Negative for rash.   Neurological:  Negative for weakness and headaches.   Hematological:  Does not bruise/bleed easily.       Physical Exam     Initial Vitals [01/26/24 0924]   BP Pulse Resp Temp SpO2   137/84 60 18 98.6 °F (37 °C) 98 %      MAP       --         Physical Exam    Vitals reviewed.  Constitutional: She appears well-developed and well-nourished. She is not diaphoretic. She is cooperative.  Non-toxic appearance. She does not have a sickly appearance. She does not appear ill. No distress.   HENT:   Head: Normocephalic and atraumatic.   Nose: Nose normal.   Mouth/Throat: No trismus in the jaw.   Eyes: Conjunctivae and EOM are normal.   Neck:   Normal range of motion.  Cardiovascular:  Normal rate and regular rhythm.           Pulmonary/Chest: Breath sounds normal. No accessory muscle usage. No tachypnea. No respiratory distress.  She has no wheezes. She has no rales. She exhibits tenderness.     Abdominal: She exhibits no distension.   Musculoskeletal:         General: Normal range of motion.      Cervical back: Normal range of motion.      Right lower leg: No edema.      Left lower leg: No edema.      Comments: No unilateral leg swelling, peripheral edema, or calf tenderness.     Neurological: She is alert. She has normal strength.   Skin: Skin is warm and dry. No erythema. No pallor.         ED Course   Procedures  Labs Reviewed   CBC W/ AUTO DIFFERENTIAL - Abnormal; Notable for the following components:       Result Value    MPV 8.9 (*)     All other components within normal limits   COMPREHENSIVE METABOLIC PANEL - Abnormal; Notable for the following components:    Anion Gap 6 (*)     All other components within normal limits   D DIMER, QUANTITATIVE   B-TYPE NATRIURETIC PEPTIDE   TROPONIN I   TROPONIN I        ECG Results              EKG 12-lead (Final result)  Result time 01/26/24 10:11:57      Final result by Interface, Lab In [facility] (01/26/24 10:11:57)                   Narrative:    Test Reason : R07.9,    Vent. Rate : 056 BPM     Atrial Rate : 056 BPM     P-R Int : 170 ms          QRS Dur : 078 ms      QT Int : 446 ms       P-R-T Axes : 068 055 040 degrees     QTc Int : 430 ms    Sinus bradycardia  Low voltage QRS  Borderline Abnormal ECG  When compared with ECG of 27-JUL-2018 16:49,  No significant change was found  Confirmed by Jaziel JACKSON MD ([id number]) on 1/26/2024 10:11:46 AM    Referred By:             Confirmed By:Jaziel JACKSON MD                                  Imaging Results              X-Ray Chest PA And Lateral (Final result)  Result time 01/26/24 11:13:49      Final result by Chato Monet MD (01/26/24 11:13:49)                   Impression:      No acute cardiopulmonary disease.      Electronically signed by: Chato Monet MD  Date:    01/26/2024  Time:    11:13               Narrative:    EXAMINATION:  XR CHEST  PA AND LATERAL    CLINICAL HISTORY:  Chest pain, unspecified    TECHNIQUE:  PA and lateral views of the chest were performed.    COMPARISON:  11/13/2017    FINDINGS:  The heart size and mediastinal contour are normal.  Lungs are expanded and clear.  No pleural fluid detected.  Skeletal structures are intact.                                       Medications   aspirin tablet 325 mg (325 mg Oral Given 1/26/24 1055)   aluminum-magnesium hydroxide-simethicone 200-200-20 mg/5 mL suspension 30 mL (30 mLs Oral Given 1/26/24 1159)     And   LIDOcaine viscous HCl 2% oral solution 15 mL (15 mLs Oral Given 1/26/24 1159)     Medical Decision Making  Patient is a 53-year-old female with a history colon cancer status post resection, in remission who presents to [facility] ED with chest pain.    Differential diagnosis includes but is not limited to PE, ACS, less likely CHF given no signs of hypervolemia, costochondritis, chest wall strain, GERD.    Will initiate workup including D-dimer given pleuritic chest pain and history of cancer, and reassess.    Amount and/or Complexity of Data Reviewed  Labs: ordered. Decision-making details documented in ED Course.  Radiology: ordered.    Risk  OTC drugs.  Prescription drug management.      Additional MDM:   PERC Rule:   Age is greater than or equal to 50 = 1.0  Heart Rate is greater than or equal to 100 = 0.0  SaO2 on room air < 95% = 0.0  Unilateral leg swelling = 0.0  Hemoptysis = 0.0  Recent surgery or trauma = 0.0  Prior PE or DVT =  0.0  Hormone use = 0.00  PERC Score = 1        Well's Criteria Score:  -Clinical symptoms of DVT (leg swelling, pain with palpation) = 0.0  -PE is #1 diagnosis OR equally likely =            0.0  -Heart Rate >100 =   0.0  -Immobilization (= or > than 3 days) or surgery in the previous 4 weeks = 0.0  -Previous DVT/PE = 0.0  -Hemoptysis =          0.0  -Malignancy =           1.0  Well's Probability Score =    1      Heart Score:    History:          Slightly  suspicious.  ECG:             Normal  Age:               45-65 years  Risk factors: 1-2 risk factors  Troponin:       Less than or equal to normal limit  Heart Score = 2                ED Course as of 01/27/24 1013   Fri Jan 26, 2024   1006 BP: 137/84 [CL]   1006 Temp: 98.6 °F (37 °C) [CL]   1006 Pulse: 60 [CL]   1006 Resp: 18 [CL]   1006 SpO2: 98 % [CL]   1059 WBC: 6.33 [CL]   1059 Hemoglobin: 12.4 [CL]   1059 Platelet Count: 242 [CL]   1127 D-Dimer: 0.30  Neg VTE.  [CL]   1127 Troponin I: <0.006  Will need to trend given onset of symptoms this AM. [CL]   1128 BNP: 18 [CL]   1128 WBC: 6.33 [CL]   1128 Hemoglobin: 12.4 [CL]   1128 Platelet Count: 242 [CL]   1128 Sodium: 137 [CL]   1128 Potassium: 3.8 [CL]   1128 Creatinine: 0.7 [CL]   1128 BILIRUBIN TOTAL: 0.5 [CL]   1128 AST: 13 [CL]   1128 ALT: 10 [CL]      ED Course User Index  [CL] Sosa Lawrence PA-C          2nd trop flat.   Patient updated with results. Likely not ACS, PE, or CHF. She had chest wall TTP on exam so could be MSK. Will consider GERD as well.  She reports improvement while here. Resting comfortably. VSS. Heart score low so stable for outpatient follow up.   Start pepcid bid. Follow up with PCP. Return to ER precautions given.                    Clinical Impression:  Final diagnoses:  [R07.9] Chest pain  [R07.89] Chest wall tenderness (Primary)          ED Disposition Condition    Discharge Stable          ED Prescriptions       Medication Sig Dispense Start Date End Date Auth. Provider    famotidine (PEPCID) 20 MG tablet Take 1 tablet (20 mg total) by mouth every 12 (twelve) hours. 60 tablet 1/26/2024 2/25/2024 Sosa Lawrence PA-C Le, Catherine, PA-C  01/27/24 1013

## 2024-01-26 NOTE — ED TRIAGE NOTES
Pt. Began having left sided anterior chest pain today. No fevers. Pt. Also c/o headache. Reports pain with inspiration. No meds today. No emesis or nausea. No radiating pain.

## 2024-01-26 NOTE — Clinical Note
"Leni"Jerrell Henson was seen and treated in our emergency department on 1/26/2024.  She may return to work on 01/29/2024.       If you have any questions or concerns, please don't hesitate to call.      Daniel Thibodeaux NRP     "

## 2025-03-25 ENCOUNTER — TELEPHONE (OUTPATIENT)
Dept: SURGERY | Facility: CLINIC | Age: 55
End: 2025-03-25
Payer: MEDICAID

## 2025-03-25 NOTE — TELEPHONE ENCOUNTER
I called and left a phone message for the Patient.  I explained that I was calling to find out the location of the mass that she is coming to see Dr. Sommers about.  I left the Office phone number for a return call.  I will also send a message via the Patient Portal.

## 2025-03-26 ENCOUNTER — OFFICE VISIT (OUTPATIENT)
Dept: SURGERY | Facility: CLINIC | Age: 55
End: 2025-03-26
Payer: MEDICAID

## 2025-03-26 VITALS
HEIGHT: 66 IN | WEIGHT: 239.5 LBS | SYSTOLIC BLOOD PRESSURE: 164 MMHG | DIASTOLIC BLOOD PRESSURE: 83 MMHG | OXYGEN SATURATION: 98 % | HEART RATE: 67 BPM | BODY MASS INDEX: 38.49 KG/M2

## 2025-03-26 DIAGNOSIS — R22.9 LOCALIZED MASS: ICD-10-CM

## 2025-03-26 PROCEDURE — 99999 PR PBB SHADOW E&M-EST. PATIENT-LVL IV: CPT | Mod: PBBFAC,,, | Performed by: SURGERY

## 2025-03-26 PROCEDURE — 3079F DIAST BP 80-89 MM HG: CPT | Mod: CPTII,,, | Performed by: SURGERY

## 2025-03-26 PROCEDURE — 99214 OFFICE O/P EST MOD 30 MIN: CPT | Mod: PBBFAC | Performed by: SURGERY

## 2025-03-26 PROCEDURE — 99203 OFFICE O/P NEW LOW 30 MIN: CPT | Mod: S$PBB,,, | Performed by: SURGERY

## 2025-03-26 PROCEDURE — 3077F SYST BP >= 140 MM HG: CPT | Mod: CPTII,,, | Performed by: SURGERY

## 2025-03-26 PROCEDURE — 1160F RVW MEDS BY RX/DR IN RCRD: CPT | Mod: CPTII,,, | Performed by: SURGERY

## 2025-03-26 PROCEDURE — 1159F MED LIST DOCD IN RCRD: CPT | Mod: CPTII,,, | Performed by: SURGERY

## 2025-03-26 PROCEDURE — 3008F BODY MASS INDEX DOCD: CPT | Mod: CPTII,,, | Performed by: SURGERY

## 2025-03-26 NOTE — PROGRESS NOTES
"[facility] H&P    Subjective:     Leni Henson is a 54 y.o. female with PMH colon cancer s/p sigmoid colectomy on 8/6/2018  who presents to clinic for pain in right shoulder with possible mass.     Swelling on right lateral shoulder. Noticed a lump about a month ago and then had pain with lifting objects. No trauma to that area. Trouble with lift right arm about head and has some weakness.    Right handed.     PMH:   Past Medical History:   Diagnosis Date    Malignant neoplasm of splenic flexure 7/27/2018       Past Surgical History:   Past Surgical History:   Procedure Laterality Date    CHOLECYSTECTOMY  2015    laparoscopic    COLECTOMY  2018    COLONOSCOPY N/A 7/25/2018    Procedure: COLONOSCOPY;  Surgeon: Rashad Stahl MD;  Location: [facility] ENDO;  Service: Endoscopy;  Laterality: N/A;    COLONOSCOPY N/A 10/3/2018    Procedure: COLONOSCOPY with Julio;  Surgeon: Thomas Kim MD;  [facility]  Service: Endoscopy;  Laterality: N/A;  in 6 weeks please    COLONOSCOPY N/A 3/25/2022    Procedure: COLONOSCOPY;  Surgeon: SARAHI Kim MD;  [facility]  Service: Endoscopy;  Laterality: N/A;  vaccinated    HYSTERECTOMY  2010    LAPAROSCOPIC SIGMOIDECTOMY N/A 8/6/2018    Procedure: Lap sigmoid colectomy;  Surgeon: Thomas Kim MD;  [facility];  Service: Colon and Rectal;  Laterality: N/A;    TUBAL LIGATION  2000s       Social History:Social History[1]    Allergies:   Review of patient's allergies indicates:   Allergen Reactions    Adhesive Other (See Comments) and Rash     "burn"  "burn"       Medications:  Medications Ordered Prior to Encounter[2]      Objective:     Vital Signs (Most Recent)       ROS A 10+ review of systems was performed with pertinent positives and negatives noted above in the history of present illness.  Other systems were negative unless otherwise specified.    Physical Exam:  Gen: awake, alert, " in no acute distress  HEENT: normocephalic, atraumatic, EOMI, no scleral icterus  CV: regular rate and rhythm  Pulm: equal chest rise bilaterally, normal work of breathing  Abd:  soft, non-tender, no guarding  Ext: WWP, skin warm and dry  No definitive palpable mass on right shoulder        Assessment:     Leni Henson is a 54 y.o. female with right shoulder pain     Plan:     - MRI right shoulder.           [1]   Social History  Socioeconomic History    Marital status: Single   Tobacco Use    Smoking status: Never    Smokeless tobacco: Never   Substance and Sexual Activity    Alcohol use: Yes     Comment: social    Drug use: No    Sexual activity: Yes     Partners: Male   [2]   Current Outpatient Medications on File Prior to Visit   Medication Sig Dispense Refill    albuterol (PROVENTIL/VENTOLIN HFA) 90 mcg/actuation inhaler       atorvastatin (LIPITOR) 20 MG tablet Take 20 mg by mouth once daily.      famotidine (PEPCID) 20 MG tablet Take 1 tablet (20 mg total) by mouth every 12 (twelve) hours. 60 tablet 0    fluticasone propionate (FLONASE) 50 mcg/actuation nasal spray 1 spray (50 mcg total) by Each Nostril route once daily. 1 Bottle 0    gabapentin (NEURONTIN) 300 MG capsule Take 600 mg by mouth every evening.      LIDOcaine (LIDODERM) 5 %       linaCLOtide (LINZESS) 72 mcg Cap capsule Take 1 capsule (72 mcg total) by mouth before breakfast. 30 capsule 11    loratadine (CLARITIN) 10 mg tablet Take 10 mg by mouth once daily.      meloxicam (MOBIC) 15 MG tablet Take 15 mg by mouth 2 (two) times a day.      methocarbamoL (ROBAXIN) 500 MG Tab Take 500 mg by mouth once daily.      omeprazole (PRILOSEC) 20 MG capsule Take 20 mg by mouth once daily.      SYMBICORT 160-4.5 mcg/actuation HFAA       traMADoL (ULTRAM) 50 mg tablet Take 50 mg by mouth every 6 (six) hours as needed.       No current facility-administered medications on file prior to visit.

## 2025-04-04 ENCOUNTER — HOSPITAL ENCOUNTER (OUTPATIENT)
Dept: RADIOLOGY | Facility: HOSPITAL | Age: 55
Discharge: HOME | End: 2025-04-04
Attending: SURGERY
Payer: MEDICAID

## 2025-04-04 DIAGNOSIS — R22.9 LOCALIZED MASS: ICD-10-CM

## 2025-04-04 PROBLEM — U07.1 COVID-19: Status: RESOLVED | Noted: 2025-04-04 | Resolved: 2025-04-04

## 2025-04-04 PROBLEM — G62.9 NEUROPATHY: Status: ACTIVE | Noted: 2024-06-05

## 2025-04-04 PROBLEM — R09.82 POSTNASAL DRIP: Status: ACTIVE | Noted: 2025-04-04

## 2025-04-04 PROBLEM — Z12.11 SCREENING FOR COLON CANCER: Status: RESOLVED | Noted: 2018-10-03 | Resolved: 2025-04-04

## 2025-04-04 PROBLEM — J30.1 ALLERGIC RHINITIS DUE TO POLLEN: Status: ACTIVE | Noted: 2025-04-04

## 2025-04-04 PROBLEM — J32.9 CHRONIC SINUSITIS: Status: ACTIVE | Noted: 2025-04-04

## 2025-04-04 PROBLEM — R09.81 NASAL CONGESTION: Status: ACTIVE | Noted: 2025-04-04

## 2025-04-04 PROBLEM — J45.30 MILD PERSISTENT ASTHMA WITHOUT COMPLICATION: Status: ACTIVE | Noted: 2025-04-04

## 2025-04-04 PROCEDURE — 73220 MRI UPPR EXTREMITY W/O&W/DYE: CPT | Mod: TC,RT

## 2025-04-04 PROCEDURE — 25500020 PHARM REV CODE 255: Performed by: SURGERY

## 2025-04-04 PROCEDURE — A9585 GADOBUTROL INJECTION: HCPCS | Performed by: SURGERY

## 2025-04-04 PROCEDURE — 73220 MRI UPPR EXTREMITY W/O&W/DYE: CPT | Mod: 26,RT,, | Performed by: RADIOLOGY

## 2025-04-04 RX ORDER — MONTELUKAST SODIUM 10 MG/1
1 TABLET ORAL DAILY
COMMUNITY
Start: 2022-08-23

## 2025-04-04 RX ORDER — GADOBUTROL 604.72 MG/ML
10 INJECTION INTRAVENOUS
Status: COMPLETED | OUTPATIENT
Start: 2025-04-04 | End: 2025-04-04

## 2025-04-04 RX ORDER — CLOTRIMAZOLE AND BETAMETHASONE DIPROPIONATE 10; .64 MG/G; MG/G
CREAM TOPICAL 2 TIMES DAILY
COMMUNITY
Start: 2025-02-14

## 2025-04-04 RX ORDER — EPINEPHRINE 0.3 MG/.3ML
1 INJECTION SUBCUTANEOUS CONTINUOUS PRN
COMMUNITY

## 2025-04-04 RX ORDER — AZELASTINE 1 MG/ML
1 SPRAY, METERED NASAL 2 TIMES DAILY
COMMUNITY
Start: 2025-03-25 | End: 2026-03-25

## 2025-04-04 RX ORDER — NAPROXEN 500 MG/1
500 TABLET ORAL 2 TIMES DAILY PRN
COMMUNITY
Start: 2025-03-13

## 2025-04-04 RX ORDER — CETIRIZINE HYDROCHLORIDE 10 MG/1
1 TABLET, CHEWABLE ORAL DAILY
COMMUNITY
Start: 2024-09-26 | End: 2025-09-26

## 2025-04-04 RX ADMIN — GADOBUTROL 10 ML: 604.72 INJECTION INTRAVENOUS at 06:04

## 2025-04-08 ENCOUNTER — OFFICE VISIT (OUTPATIENT)
Dept: OBSTETRICS AND GYNECOLOGY | Facility: CLINIC | Age: 55
End: 2025-04-08
Payer: MEDICAID

## 2025-04-08 VITALS
SYSTOLIC BLOOD PRESSURE: 128 MMHG | BODY MASS INDEX: 38.18 KG/M2 | WEIGHT: 236.56 LBS | DIASTOLIC BLOOD PRESSURE: 83 MMHG

## 2025-04-08 DIAGNOSIS — Z01.419 WELL WOMAN EXAM: Primary | ICD-10-CM

## 2025-04-08 DIAGNOSIS — Z12.4 ENCOUNTER FOR SCREENING FOR MALIGNANT NEOPLASM OF CERVIX: ICD-10-CM

## 2025-04-08 PROCEDURE — 1160F RVW MEDS BY RX/DR IN RCRD: CPT | Mod: CPTII,,, | Performed by: STUDENT IN AN ORGANIZED HEALTH CARE EDUCATION/TRAINING PROGRAM

## 2025-04-08 PROCEDURE — 99999 PR PBB SHADOW E&M-EST. PATIENT-LVL IV: CPT | Mod: PBBFAC,,, | Performed by: STUDENT IN AN ORGANIZED HEALTH CARE EDUCATION/TRAINING PROGRAM

## 2025-04-08 PROCEDURE — 3074F SYST BP LT 130 MM HG: CPT | Mod: CPTII,,, | Performed by: STUDENT IN AN ORGANIZED HEALTH CARE EDUCATION/TRAINING PROGRAM

## 2025-04-08 PROCEDURE — 99386 PREV VISIT NEW AGE 40-64: CPT | Mod: S$PBB,,, | Performed by: STUDENT IN AN ORGANIZED HEALTH CARE EDUCATION/TRAINING PROGRAM

## 2025-04-08 PROCEDURE — 99214 OFFICE O/P EST MOD 30 MIN: CPT | Mod: PBBFAC,PO | Performed by: STUDENT IN AN ORGANIZED HEALTH CARE EDUCATION/TRAINING PROGRAM

## 2025-04-08 PROCEDURE — 87624 HPV HI-RISK TYP POOLED RSLT: CPT | Performed by: STUDENT IN AN ORGANIZED HEALTH CARE EDUCATION/TRAINING PROGRAM

## 2025-04-08 PROCEDURE — 3079F DIAST BP 80-89 MM HG: CPT | Mod: CPTII,,, | Performed by: STUDENT IN AN ORGANIZED HEALTH CARE EDUCATION/TRAINING PROGRAM

## 2025-04-08 PROCEDURE — 1159F MED LIST DOCD IN RCRD: CPT | Mod: CPTII,,, | Performed by: STUDENT IN AN ORGANIZED HEALTH CARE EDUCATION/TRAINING PROGRAM

## 2025-04-08 PROCEDURE — 3008F BODY MASS INDEX DOCD: CPT | Mod: CPTII,,, | Performed by: STUDENT IN AN ORGANIZED HEALTH CARE EDUCATION/TRAINING PROGRAM

## 2025-04-08 NOTE — PROGRESS NOTES
"History & Physical  Gynecology      SUBJECTIVE:     Chief Complaint: Well Woman       History of Present Illness:  54 y.o. female  here for annual GYN visit.   She is .   She denies vaginal itching, irritation, or discharge.  Patient is sexually active with 1 male partner.  She does not use tobacco, alcohol or drugs.  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 Lives with her  and reports feeling safe at home.     Patient denies history of abnormal paps  Last Pap: "years ago."  Last MMG: Recently done at [facility]  Last Colonoscopy: Due in 2 years per patient. Patient is a survivor of colon cancer.    Patient mother, sister and maternal grandmother have had breast cancer.      Review of patient's allergies indicates:   Allergen Reactions    Adhesive Other (See Comments) and Rash     "burn"  "burn"       Past Medical History:   Diagnosis Date    COVID-19 2025    Malignant neoplasm of splenic flexure 2018     Past Surgical History:   Procedure Laterality Date    CHOLECYSTECTOMY      laparoscopic    COLONOSCOPY N/A 2018    Procedure: COLONOSCOPY;  Surgeon: Rashad Stahl MD;  Location: [facility];  Service: Endoscopy;  Laterality: N/A;    COLONOSCOPY N/A 10/03/2018    Procedure: COLONOSCOPY with Julio;  Surgeon: Thomas Kim MD;  Location: [facility] ([address] [place] [address]);  Service: Endoscopy;  Laterality: N/A;  in 6 weeks please    COLONOSCOPY N/A 2022    Procedure: COLONOSCOPY;  Surgeon: SARAHI Kim MD;  Location: [address]);  Service: Endoscopy;  Laterality: N/A;  vaccinated    HYSTERECTOMY      LAPAROSCOPIC SIGMOIDECTOMY N/A 2018    Procedure: Lap sigmoid colectomy;  Surgeon: Thomas Kim MD;  [address]  Service: Colon and Rectal;  Laterality: N/A;    TUBAL LIGATION       OB History          4    Para   3    Term   3            AB   1    Living   3         SAB        IAB        Ectopic        Multiple        " Live Births   3               Family History   Problem Relation Name Age of Onset    Breast cancer Mother  70    Breast cancer Sister  48    Prostate cancer Father      Colon cancer Brother  62     Social History[1]    Current Outpatient Medications   Medication Sig    albuterol (PROVENTIL/VENTOLIN HFA) 90 mcg/actuation inhaler     atorvastatin (LIPITOR) 20 MG tablet Take 20 mg by mouth once daily.    azelastine (ASTELIN) 137 mcg (0.1 %) nasal spray 1 spray by Nasal route 2 (two) times daily.    cetirizine 10 mg chewable tablet Take 1 tablet by mouth once daily.    clotrimazole-betamethasone 1-0.05% (LOTRISONE) cream Apply topically 2 (two) times daily.    EPINEPHrine (EPIPEN) 0.3 mg/0.3 mL AtIn Inject 1 each into the muscle continuous prn (anaphylaxis).    fluticasone propionate (FLONASE) 50 mcg/actuation nasal spray 1 spray (50 mcg total) by Each Nostril route once daily.    gabapentin (NEURONTIN) 300 MG capsule Take 600 mg by mouth every evening.    LIDOcaine (LIDODERM) 5 %     loratadine (CLARITIN) 10 mg tablet Take 10 mg by mouth once daily.    meloxicam (MOBIC) 15 MG tablet Take 15 mg by mouth 2 (two) times a day.    methocarbamoL (ROBAXIN) 500 MG Tab Take 500 mg by mouth once daily.    montelukast (SINGULAIR) 10 mg tablet Take 1 tablet by mouth once daily.    naproxen (NAPROSYN) 500 MG tablet Take 500 mg by mouth 2 (two) times daily as needed.    SYMBICORT 160-4.5 mcg/actuation HFAA     traMADoL (ULTRAM) 50 mg tablet Take 50 mg by mouth every 6 (six) hours as needed.    famotidine (PEPCID) 20 MG tablet Take 1 tablet (20 mg total) by mouth every 12 (twelve) hours.    linaCLOtide (LINZESS) 72 mcg Cap capsule Take 1 capsule (72 mcg total) by mouth before breakfast.    omeprazole (PRILOSEC) 20 MG capsule Take 20 mg by mouth once daily.     No current facility-administered medications for this visit.       Review of Systems:  Review of Systems   Constitutional:  Negative for chills, fatigue and fever.   HENT:   Negative for congestion.    Eyes:  Negative for visual disturbance.   Respiratory:  Negative for cough and shortness of breath.    Cardiovascular:  Negative for chest pain and palpitations.   Gastrointestinal:  Negative for abdominal distention, abdominal pain, constipation, diarrhea, nausea and vomiting.   Genitourinary:  Negative for difficulty urinating, dysuria, hematuria, vaginal bleeding and vaginal discharge.   Skin:  Negative for rash.   Neurological:  Negative for dizziness, seizures, light-headedness and headaches.   Hematological:  Does not bruise/bleed easily.   Psychiatric/Behavioral:  Negative for dysphoric mood. The patient is not nervous/anxious.         OBJECTIVE:     Physical Exam:  Vitals:    04/08/25 1314   BP: 128/83      Physical Exam  Vitals and nursing note reviewed. Exam conducted with a chaperone present.   Constitutional:       General: She is not in acute distress.     Appearance: She is well-developed.   HENT:      Head: Normocephalic and atraumatic.   Eyes:      Pupils: Pupils are equal, round, and reactive to light.   Cardiovascular:      Rate and Rhythm: Normal rate and regular rhythm.   Pulmonary:      Effort: Pulmonary effort is normal. No respiratory distress.   Chest:   Breasts:     Right: Normal.      Left: Normal.   Abdominal:      General: There is no distension.      Palpations: Abdomen is soft. There is no mass.      Tenderness: There is no abdominal tenderness. There is no guarding.   Genitourinary:     Comments: SSE: Normal external female genitalia, normal urethral meatus, normal vaginal rugae, normal vaginal mucosa, no vaginal blood in canal, normal physiologic discharge, normal cervix, no adnexal masses palpated on bimanual exam.   Musculoskeletal:         General: Normal range of motion.      Cervical back: Normal range of motion and neck supple.   Skin:     General: Skin is warm and dry.   Neurological:      Mental Status: She is alert and oriented to person, place,  and time.   Psychiatric:         Behavior: Behavior normal.         Thought Content: Thought content normal.         Judgment: Judgment normal.         ASSESSMENT/PLAN:     1. Well woman exam (Primary)  - Pap smear - Co-Testing today  - Mammogram - Up to date  - Colonoscopy - Up to date  - Calcium and Vitamin D counseling  14 - 30 years old 1,300mg Ca/d; 600 IU vit D/d  31 - 50 years old 1,000 Ca/d; 600 IU vit D/d  51 - 70 year old: 1,200 Ca/d; 600 IU vit D/d  71+ years old 1,200 Ca/d; 800 IU vit D/d  - Liquid-Based Pap Smear, Screening  - HPV High Risk Genotypes, PCR    Todd Lara M.D.  OB/GYN  [facility]         [1]   Social History  Tobacco Use    Smoking status: Never    Smokeless tobacco: Never   Substance Use Topics    Alcohol use: Yes     Comment: social    Drug use: No

## 2025-04-11 ENCOUNTER — TELEPHONE (OUTPATIENT)
Dept: SURGERY | Facility: CLINIC | Age: 55
End: 2025-04-11
Payer: MEDICAID

## 2025-04-11 NOTE — TELEPHONE ENCOUNTER
----- Message from Estela sent at 4/11/2025  8:28 AM CDT -----  Who Called: self  Name of Test (Lab/Mammo/Etc):MRI Humerus W WO Contrast Right Date of Test: 04/04/2025  Ordering Provider: ANILA RONDON Where the test was performed: [facility]  Would the patient rather a call back or a response via [facility]? Call back  Best Call Back Number: [phone] Additional Information:   For Clinical Team:Has the provider reviewed the results? No

## 2025-04-12 ENCOUNTER — RESULTS FOLLOW-UP (OUTPATIENT)
Dept: SURGERY | Facility: CLINIC | Age: 55
End: 2025-04-12

## 2025-04-15 ENCOUNTER — HOSPITAL ENCOUNTER (EMERGENCY)
Facility: HOSPITAL | Age: 55
Discharge: HOME | End: 2025-04-15
Attending: EMERGENCY MEDICINE
Payer: MEDICAID

## 2025-04-15 ENCOUNTER — NURSE TRIAGE (OUTPATIENT)
Dept: ADMINISTRATIVE | Facility: CLINIC | Age: 55
End: 2025-04-15
Payer: MEDICAID

## 2025-04-15 VITALS
SYSTOLIC BLOOD PRESSURE: 125 MMHG | DIASTOLIC BLOOD PRESSURE: 80 MMHG | TEMPERATURE: 98 F | RESPIRATION RATE: 23 BRPM | OXYGEN SATURATION: 99 % | HEART RATE: 63 BPM

## 2025-04-15 DIAGNOSIS — M25.511 ACUTE PAIN OF RIGHT SHOULDER: ICD-10-CM

## 2025-04-15 DIAGNOSIS — M77.11 RIGHT LATERAL EPICONDYLITIS: Primary | ICD-10-CM

## 2025-04-15 PROCEDURE — 63600175 PHARM REV CODE 636 W HCPCS: Performed by: PHYSICIAN ASSISTANT

## 2025-04-15 PROCEDURE — 96372 THER/PROPH/DIAG INJ SC/IM: CPT | Performed by: PHYSICIAN ASSISTANT

## 2025-04-15 PROCEDURE — 99284 EMERGENCY DEPT VISIT MOD MDM: CPT | Mod: 25

## 2025-04-15 RX ORDER — DEXAMETHASONE SODIUM PHOSPHATE 4 MG/ML
8 INJECTION, SOLUTION INTRA-ARTICULAR; INTRALESIONAL; INTRAMUSCULAR; INTRAVENOUS; SOFT TISSUE
Status: COMPLETED | OUTPATIENT
Start: 2025-04-15 | End: 2025-04-15

## 2025-04-15 RX ORDER — NAPROXEN 500 MG/1
500 TABLET ORAL 2 TIMES DAILY WITH MEALS
Qty: 30 TABLET | Refills: 0 | Status: SHIPPED | OUTPATIENT
Start: 2025-04-15

## 2025-04-15 RX ORDER — NAPROXEN 500 MG/1
500 TABLET ORAL 2 TIMES DAILY WITH MEALS
Qty: 30 TABLET | Refills: 0 | Status: SHIPPED | OUTPATIENT
Start: 2025-04-15 | End: 2025-04-15

## 2025-04-15 RX ORDER — DICLOFENAC SODIUM 10 MG/G
2 GEL TOPICAL 4 TIMES DAILY
Qty: 50 G | Refills: 0 | Status: SHIPPED | OUTPATIENT
Start: 2025-04-15

## 2025-04-15 RX ADMIN — DEXAMETHASONE SODIUM PHOSPHATE 8 MG: 4 INJECTION, SOLUTION INTRA-ARTICULAR; INTRALESIONAL; INTRAMUSCULAR; INTRAVENOUS; SOFT TISSUE at 11:04

## 2025-04-15 NOTE — DISCHARGE INSTRUCTIONS

## 2025-04-15 NOTE — TELEPHONE ENCOUNTER
Leni c/o right arm pain for the past couple of weeks, worse yesterday and today. Denies injury. Denies chest pain. Pt also reports dizziness present now and for the past 2 days. Advised Leni per triage protocol to go to nearest ED now for physician adrien. Instructed do not drive self. Call  now if no immediate . Leni v/u.   Reason for Disposition   Age > 40 and no obvious cause for pain, pain still present even when not moving the arm    Additional Information   Negative: Shock suspected (e.g., cold/pale/clammy skin, too weak to stand, low BP, rapid pulse)   Negative: Similar pain previously and it was from 'heart attack'   Negative: Similar pain previously from 'angina' and not relieved by nitroglycerin   Negative: Sounds like a life-threatening emergency to the triager   Negative: Followed an injury to arm   Negative: Chest pain   Negative: Wound looks infected (e.g., spreading redness, pus)   Negative: Difficulty breathing or unusual sweating (e.g., sweating without exertion)   Negative: Chest pain lasting longer than 5 minutes    Protocols used: Arm Pain-A-[place]

## 2025-04-15 NOTE — Clinical Note
"Leni"Jerrell Henson was seen and treated in our emergency department on 4/15/2025.  She may return to work on 04/17/2025.       If you have any questions or concerns, please don't hesitate to call.      Rajat Birmingham PA-C"

## 2025-04-15 NOTE — ED PROVIDER NOTES
"Encounter Date: 4/15/2025       History     Chief Complaint   Patient presents with    Arm Pain     Pt reports to ED for evaluation of RUE pain for the past couple of weeks, worse yesterday and today. Reports pain 10/10 and sharp to entire extremity. Denies injury. Denies chest pain. Pt also reports lightheadedness present now and for the past 2 days. Pt endorses episodes of dizziness and numbness to fingers. Upon assessment, pt noted to have weakness to RUE, but pt reports weakness is r/t increasing in pain     Pleasant 54-year-old female presents to ED with concern of pain to right shoulder and elbow.  Denying any specific injury or trauma.  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  She states symptoms began with right shoulder pain 2 weeks ago described as sharp, worse with movement.  She tried OTC medications but with no improvement.  She then began to develop pain in her right elbow, also sharp and worse with movement.  No numbness or focal weakness.  Denying any associated chest pain, cough, shortness breath, palpitations.  She reports her pain symptoms have disrupted her sleep, and she states that her lack of sleep has caused her to feel "lightheaded" intermittently.  Denying dizziness, sensation of room spinning but describes her "lightheadedness" as a generalized fatigue.  No other acute complaints at this time    The history is provided by the patient.     Review of patient's allergies indicates:   Allergen Reactions    Adhesive Other (See Comments) and Rash     "burn"  "burn"     Past Medical History:   Diagnosis Date    COVID-19 04/04/2025    Malignant neoplasm of splenic flexure 07/27/2018     Past Surgical History:   Procedure Laterality Date    CHOLECYSTECTOMY  2015    laparoscopic    COLONOSCOPY N/A 07/25/2018    Procedure: COLONOSCOPY;  Surgeon: Rashad Stahl MD;  Location: [facility];  Service: Endoscopy;  Laterality: N/A;    COLONOSCOPY N/A 10/03/2018    " Procedure: COLONOSCOPY with Julio;  Surgeon: Thomas Kim MD;  Location: [facility] ENDO (4TH FLR);  Service: Endoscopy;  Laterality: N/A;  in 6 weeks please    COLONOSCOPY N/A 03/25/2022    Procedure: COLONOSCOPY;  Surgeon: SARAHI Kim MD;  Location: [facility] ENDO (4TH FLR);  Service: Endoscopy;  Laterality: N/A;  vaccinated    HYSTERECTOMY  2010    LAPAROSCOPIC SIGMOIDECTOMY N/A 08/06/2018    Procedure: Lap sigmoid colectomy;  Surgeon: Thomas Kim MD;  Location: [facility] OR 2ND FLR;  Service: Colon and Rectal;  Laterality: N/A;    TUBAL LIGATION  2000s     Family History   Problem Relation Name Age of Onset    Breast cancer Mother  70    Breast cancer Sister  48    Prostate cancer Father      Colon cancer Brother  62     Social History[1]  Review of Systems   Constitutional:  Negative for chills and fever.   Respiratory:  Negative for cough and shortness of breath.    Cardiovascular:  Negative for chest pain and palpitations.   Gastrointestinal:  Negative for abdominal pain, diarrhea, nausea and vomiting.   Genitourinary:  Negative for dysuria.   Musculoskeletal:  Positive for arthralgias and myalgias. Negative for back pain, neck pain and neck stiffness.   Skin:  Negative for rash and wound.   Neurological:  Negative for weakness and numbness.       Physical Exam     Initial Vitals [04/15/25 1030]   BP Pulse Resp Temp SpO2   (!) 140/80 89 18 97.9 °F (36.6 °C) 99 %      MAP       --         Physical Exam    Vitals reviewed.  Constitutional: She appears well-developed and well-nourished. She is active. She does not have a sickly appearance. She does not appear ill. No distress.   Pleasant, well-appearing, no apparent distress   HENT:   Head: Normocephalic and atraumatic.   Neck:   Normal range of motion.  Cardiovascular:  Normal rate.           Pulmonary/Chest: Effort normal.   Musculoskeletal:      Cervical back: Normal range of motion. No spinous process tenderness or muscular tenderness.       "Comments: Reproducible tenderness to lateral aspect of right shoulder with no obvious palpable deformities.  No acute skin changes.  ROM intact but limited secondary to pain with ROM.  Tenderness also noted over right lateral epicondyle region with no palpable deformities, skin changes, erythema or warmth.  Pain symptoms significantly worsened with wrist extension against resistance.  RUE sensations intact.  Radial pulse intact.     Neurological: She is alert. GCS eye subscore is 4. GCS verbal subscore is 5. GCS motor subscore is 6.   Skin: Skin is warm and dry.   Psychiatric: She has a normal mood and affect. Her speech is normal and behavior is normal.         ED Course   Procedures  Labs Reviewed - No data to display       Imaging Results    None          Medications   dexAMETHasone injection 8 mg (8 mg Intramuscular Given 4/15/25 1118)     Medical Decision Making  Patient presenting with concern for pain throughout right upper extremity that began 2 weeks ago.  No specific injury or trauma.  Pain symptoms worse with touch or movement but alleviated with rest.  No numbness or focal weakness.  No associated cardiac complaints.  Afebrile.  Patient in no distress on exam    DDx:  Including but not limited to musculoskeletal, strain, sprain, radiculopathy, neuropathy, RTC injury, bursitis, arthritis, lateral epicondylitis, right elbow    Risk  Prescription drug management.               ED Course as of 04/15/25 1128   Tue Apr 15, 2025   1122 Lengthy discussion was made with patient.  Patient states her "lightheadedness" is due to fatigue from lack of sleep.  She is denying any associated dizziness, sensation of room spinning along with no cardiac complaints/palpitations.  She is deferring any further "lightheaded" ED workup today.  She states she presents to ED due to pain symptoms throughout her right arm.  No injury or trauma; will defer x-rays due to low concern for acute fracture or dislocation.  I do have high " suspicion patient's presenting symptoms are musculoskeletal.  Right shoulder pain is likely secondary to bursitis/tendinitis/possible RTC injury.  Her right elbow pain appears consistent with lateral epicondylitis.  Will plan to continue with supportive care.  Patient's supplied with shoulder sling in ED along with steroid injection.  Prescriptions as written below.  Ambulatory referral sent to Orthopedics.  Encouraged activities and movements as tolerated, RICE, PCP/ortho follow-up.  ED return precautions were discussed.  Patient states understanding and agrees with plan [KS]      ED Course User Index  [KS] Rajat Birmingham PA-C                           Clinical Impression:  Final diagnoses:  [M25.511] Acute pain of right shoulder  [M77.11] Right lateral epicondylitis (Primary)          ED Disposition Condition    Discharge Stable          ED Prescriptions       Medication Sig Dispense Start Date End Date Auth. Provider    naproxen (NAPROSYN) 500 MG tablet Take 1 tablet (500 mg total) by mouth 2 (two) times daily with meals. 30 tablet 4/15/2025 -- Rajat Birmingham PA-C    diclofenac sodium (VOLTAREN ARTHRITIS PAIN) 1 % Gel Apply 2 g topically 4 (four) times daily. 50 g 4/15/2025 -- Rajat Birmingham PA-C          Follow-up Information       Follow up With Specialties Details Why Contact Info    Your Doctor                   [1]   Social History  Tobacco Use    Smoking status: Never    Smokeless tobacco: Never   Substance Use Topics    Alcohol use: Yes     Comment: social    Drug use: No        Rajat Birmingham PA-C  04/15/25 [id number]

## 2025-04-15 NOTE — ED NOTES
"Patient seen in room with  at bedside. Patient reports " For the last week my arm has hurt but in the last two days it is hard to even move. The pain is from my shoulder and goes to my elbow but it is difficult for me to move" My doctor told me to see a general surgeon but I cam here because of the pain. Patient in no distress at this time.   "

## 2025-04-15 NOTE — Clinical Note
"Leni"Jerrell Henson was seen and treated in our emergency department on 4/15/2025.  She may return to work on 04/16/2025.       If you have any questions or concerns, please don't hesitate to call.      Rajat Birmingham PA-C"

## 2025-04-21 DIAGNOSIS — K21.9 ESOPHAGEAL REFLUX: Primary | ICD-10-CM

## 2025-04-21 DIAGNOSIS — M77.01 MEDIAL EPICONDYLITIS OF RIGHT ELBOW: Primary | ICD-10-CM

## 2025-04-28 ENCOUNTER — RESULTS FOLLOW-UP (OUTPATIENT)
Dept: OBSTETRICS AND GYNECOLOGY | Facility: HOSPITAL | Age: 55
End: 2025-04-28

## 2025-06-04 DIAGNOSIS — R52 PAIN: Primary | ICD-10-CM

## 2025-07-18 ENCOUNTER — HOSPITAL ENCOUNTER (EMERGENCY)
Facility: HOSPITAL | Age: 55
Discharge: HOME | End: 2025-07-18
Attending: EMERGENCY MEDICINE
Payer: MEDICAID

## 2025-07-18 VITALS
BODY MASS INDEX: 40.66 KG/M2 | OXYGEN SATURATION: 95 % | HEIGHT: 66 IN | TEMPERATURE: 98 F | RESPIRATION RATE: 16 BRPM | DIASTOLIC BLOOD PRESSURE: 72 MMHG | SYSTOLIC BLOOD PRESSURE: 137 MMHG | WEIGHT: 253 LBS | HEART RATE: 52 BPM

## 2025-07-18 DIAGNOSIS — R07.9 CHEST PAIN: ICD-10-CM

## 2025-07-18 DIAGNOSIS — R07.89 CHEST PRESSURE: ICD-10-CM

## 2025-07-18 LAB
ABSOLUTE EOSINOPHIL (OHS): 0.06 K/UL
ABSOLUTE MONOCYTE (OHS): 0.49 K/UL (ref 0.3–1)
ABSOLUTE NEUTROPHIL COUNT (OHS): 3.33 K/UL (ref 1.8–7.7)
ALBUMIN SERPL BCP-MCNC: 3.3 G/DL (ref 3.5–5.2)
ALP SERPL-CCNC: 90 UNIT/L (ref 40–150)
ALT SERPL W/O P-5'-P-CCNC: 14 UNIT/L (ref 10–44)
ANION GAP (OHS): 5 MMOL/L (ref 8–16)
AST SERPL-CCNC: 15 UNIT/L (ref 11–45)
BASOPHILS # BLD AUTO: 0.02 K/UL
BASOPHILS NFR BLD AUTO: 0.3 %
BILIRUB SERPL-MCNC: 0.3 MG/DL (ref 0.1–1)
BNP SERPL-MCNC: 11 PG/ML (ref 0–99)
BUN SERPL-MCNC: 13 MG/DL (ref 6–20)
CALCIUM SERPL-MCNC: 9.2 MG/DL (ref 8.7–10.5)
CHLORIDE SERPL-SCNC: 105 MMOL/L (ref 95–110)
CO2 SERPL-SCNC: 28 MMOL/L (ref 23–29)
CREAT SERPL-MCNC: 0.8 MG/DL (ref 0.5–1.4)
ERYTHROCYTE [DISTWIDTH] IN BLOOD BY AUTOMATED COUNT: 13.1 % (ref 11.5–14.5)
GFR SERPLBLD CREATININE-BSD FMLA CKD-EPI: >60 ML/MIN/1.73/M2
GLUCOSE SERPL-MCNC: 101 MG/DL (ref 70–110)
HCT VFR BLD AUTO: 36.9 % (ref 37–48.5)
HGB BLD-MCNC: 11.7 GM/DL (ref 12–16)
IMM GRANULOCYTES # BLD AUTO: 0.01 K/UL (ref 0–0.04)
IMM GRANULOCYTES NFR BLD AUTO: 0.2 % (ref 0–0.5)
LYMPHOCYTES # BLD AUTO: 2.49 K/UL (ref 1–4.8)
MCH RBC QN AUTO: 27.5 PG (ref 27–31)
MCHC RBC AUTO-ENTMCNC: 31.7 G/DL (ref 32–36)
MCV RBC AUTO: 87 FL (ref 82–98)
NUCLEATED RBC (/100WBC) (OHS): 0 /100 WBC
PLATELET # BLD AUTO: 293 K/UL (ref 150–450)
PMV BLD AUTO: 9 FL (ref 9.2–12.9)
POTASSIUM SERPL-SCNC: 3.7 MMOL/L (ref 3.5–5.1)
PROT SERPL-MCNC: 7.8 GM/DL (ref 6–8.4)
RBC # BLD AUTO: 4.25 M/UL (ref 4–5.4)
RELATIVE EOSINOPHIL (OHS): 0.9 %
RELATIVE LYMPHOCYTE (OHS): 38.9 % (ref 18–48)
RELATIVE MONOCYTE (OHS): 7.7 % (ref 4–15)
RELATIVE NEUTROPHIL (OHS): 52 % (ref 38–73)
SODIUM SERPL-SCNC: 138 MMOL/L (ref 136–145)
TROPONIN I SERPL DL<=0.01 NG/ML-MCNC: <0.006 NG/ML
TROPONIN I SERPL DL<=0.01 NG/ML-MCNC: <0.006 NG/ML
WBC # BLD AUTO: 6.4 K/UL (ref 3.9–12.7)

## 2025-07-18 PROCEDURE — 93005 ELECTROCARDIOGRAM TRACING: CPT

## 2025-07-18 PROCEDURE — 93010 ELECTROCARDIOGRAM REPORT: CPT | Mod: ,,, | Performed by: INTERNAL MEDICINE

## 2025-07-18 PROCEDURE — 25000003 PHARM REV CODE 250: Performed by: EMERGENCY MEDICINE

## 2025-07-18 PROCEDURE — 85025 COMPLETE CBC W/AUTO DIFF WBC: CPT | Performed by: EMERGENCY MEDICINE

## 2025-07-18 PROCEDURE — 83880 ASSAY OF NATRIURETIC PEPTIDE: CPT | Performed by: EMERGENCY MEDICINE

## 2025-07-18 PROCEDURE — 80053 COMPREHEN METABOLIC PANEL: CPT | Performed by: EMERGENCY MEDICINE

## 2025-07-18 PROCEDURE — 99285 EMERGENCY DEPT VISIT HI MDM: CPT | Mod: 25

## 2025-07-18 PROCEDURE — 84484 ASSAY OF TROPONIN QUANT: CPT | Performed by: EMERGENCY MEDICINE

## 2025-07-18 RX ORDER — ASPIRIN 325 MG
325 TABLET ORAL
Status: COMPLETED | OUTPATIENT
Start: 2025-07-18 | End: 2025-07-18

## 2025-07-18 RX ADMIN — ASPIRIN 325 MG ORAL TABLET 325 MG: 325 PILL ORAL at 09:07

## 2025-07-18 NOTE — ED NOTES
Pt discharged from ED with all belongings. Pt reports decreased chest discomfort, rating 4/10. Reviewed AVS with patient including cardiology consult. Pt ambulatory independently with steady gait. VSS and pt in no visible distress upon departure from ED.

## 2025-07-18 NOTE — DISCHARGE INSTRUCTIONS
Your workup in the emergency department was reassuring.  However, you do need follow up.  You may need a stress test. A referral to Cardiology has been given to you. Call the number provided if you don't hear from a  within a week.     If you have new or worsening symptoms, such as passing out or having severe shortness of breath or worsening chest pressure, come back to the hospital immediately.

## 2025-07-18 NOTE — ED PROVIDER NOTES
Emergency Department Encounter  Provider Note  Encounter Date: 7/18/2025    Patient Name: Leni Henson  MRN: [id number]    History of Present Illness   HPI  History of Present Illness:    Chief Complaint:   Chief Complaint   Patient presents with    Chest Pain     Constant midsternal chest pressure that started ~30 minutes PTA. States radiates to back.      55-year-old female presenting with crushing chest pain that started 30 minutes prior to arrival.  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  Not associated with anything else.  Has never had this before.  Took nothing for her symptoms.    The following PMH/PSH/SocHx/FamHx has been reviewed by myself:  Past Medical History:   Diagnosis Date    COVID-19 04/04/2025    Malignant neoplasm of splenic flexure 07/27/2018     Past Surgical History:   Procedure Laterality Date    CHOLECYSTECTOMY  2015    laparoscopic    COLONOSCOPY N/A 07/25/2018    Procedure: COLONOSCOPY;  Surgeon: Rashad Stahl MD;  Location: [facility];  Service: Endoscopy;  Laterality: N/A;    COLONOSCOPY N/A 10/03/2018    Procedure: COLONOSCOPY with Kim;  Surgeon: Thomas Kim MD;  Location: [facility] ([address] [place] [address]);  Service: Endoscopy;  Laterality: N/A;  in 6 weeks please    COLONOSCOPY N/A 03/25/2022    Procedure: COLONOSCOPY;  Surgeon: SARAHI Kim MD;  [facility]  Service: Endoscopy;  Laterality: N/A;  vaccinated    HYSTERECTOMY  2010    LAPAROSCOPIC SIGMOIDECTOMY N/A 08/06/2018    Procedure: Lap sigmoid colectomy;  Surgeon: Thomas Kim MD;  [facility];  Service: Colon and Rectal;  Laterality: N/A;    TUBAL LIGATION  2000s     Social History[1]  Family History   Problem Relation Name Age of Onset    Breast cancer Mother  70    Breast cancer Sister  48    Prostate cancer Father      Colon cancer Brother  62     Allergies reviewed:  Review of patient's allergies indicates:   Allergen Reactions    Adhesive Other  "(See Comments) and Rash     "burn"  "burn"     Medications reviewed:  Medication List with Changes/Refills   Current Medications    ALBUTEROL (PROVENTIL/VENTOLIN HFA) 90 MCG/ACTUATION INHALER        ATORVASTATIN (LIPITOR) 20 MG TABLET    Take 20 mg by mouth once daily.    AZELASTINE (ASTELIN) 137 MCG (0.1 %) NASAL SPRAY    1 spray by Nasal route 2 (two) times daily.    CETIRIZINE 10 MG CHEWABLE TABLET    Take 1 tablet by mouth once daily.    CLOTRIMAZOLE-BETAMETHASONE 1-0.05% (LOTRISONE) CREAM    Apply topically 2 (two) times daily.    DICLOFENAC SODIUM (VOLTAREN ARTHRITIS PAIN) 1 % GEL    Apply 2 g topically 4 (four) times daily.    EPINEPHRINE (EPIPEN) 0.3 MG/0.3 ML ATIN    Inject 1 each into the muscle continuous prn (anaphylaxis).    FAMOTIDINE (PEPCID) 20 MG TABLET    Take 1 tablet (20 mg total) by mouth every 12 (twelve) hours.    FLUTICASONE PROPIONATE (FLONASE) 50 MCG/ACTUATION NASAL SPRAY    1 spray (50 mcg total) by Each Nostril route once daily.    GABAPENTIN (NEURONTIN) 300 MG CAPSULE    Take 600 mg by mouth every evening.    LIDOCAINE (LIDODERM) 5 %        LINACLOTIDE (LINZESS) 72 MCG CAP CAPSULE    Take 1 capsule (72 mcg total) by mouth before breakfast.    LORATADINE (CLARITIN) 10 MG TABLET    Take 10 mg by mouth once daily.    METHOCARBAMOL (ROBAXIN) 500 MG TAB    Take 500 mg by mouth once daily.    MONTELUKAST (SINGULAIR) 10 MG TABLET    Take 1 tablet by mouth once daily.    NAPROXEN (NAPROSYN) 500 MG TABLET    Take 1 tablet (500 mg total) by mouth 2 (two) times daily with meals.    OMEPRAZOLE (PRILOSEC) 20 MG CAPSULE    Take 20 mg by mouth once daily.    SYMBICORT 160-4.5 MCG/ACTUATION HFAA        TRAMADOL (ULTRAM) 50 MG TABLET    Take 50 mg by mouth every 6 (six) hours as needed.       Physical Exam     Initial Vitals [07/18/25 0829]   BP Pulse Resp Temp SpO2   128/79 (!) 57 18 98.5 °F (36.9 °C) 98 %      MAP       --           Triage vital signs reviewed.    Constitutional: Well-nourished, " well-developed. Not in acute distress.  HENT: Normocephalic, atraumatic. Moist mucous membranes.  Eyes: No conjunctival injection.  Resp: Normal respiratory effort, breathing unlabored.  Cardio: Regular rate and rhythm.  GI: Abdomen non-distended.   MSK: Extremities without any deformities noted. No lower extremity edema.  Skin: Warm and dry. No rashes or lesions noted.  Neuro: Awake and alert. Moves all extremities.    ED Course   Procedures    Medical Decision Making    History Acquisition     The history is provided by the patient.     Review of prior external/non ED notes:  History of colon cancer status post sigmoidectomy    Differential Diagnoses   Based on available information and initial assessment, the working Differential Diagnosis includes, but is not limited to:  ACS/MI, PE, aortic dissection, pneumothorax, cardiac tamponade, pericarditis/myocarditis, pneumonia, infection/abscess, lung mass, trauma/fracture, costochondritis/pleurisy, MSK pain/contusion, GERD, biliary disease, pancreatitis, anemia.    EKG   EKG ordered and independently reviewed by me:   Sinus bradycardia, rate 52, normal intervals, normal axis, no STEMI    Labs   Lab tests ordered and independently reviewed by me:    Labs Reviewed   COMPREHENSIVE METABOLIC PANEL - Abnormal       Result Value    Sodium 138      Potassium 3.7      Chloride 105      CO2 28      Glucose 101      BUN 13      Creatinine 0.8      Calcium 9.2      Protein Total 7.8      Albumin 3.3 (*)     Bilirubin Total 0.3      ALP 90      AST 15      ALT 14      Anion Gap 5 (*)     eGFR >60     CBC WITH DIFFERENTIAL - Abnormal    WBC 6.40      RBC 4.25      HGB 11.7 (*)     HCT 36.9 (*)     MCV 87      MCH 27.5      MCHC 31.7 (*)     RDW 13.1      Platelet Count 293      MPV 9.0 (*)     Nucleated RBC 0      Neut % 52.0      Lymph % 38.9      Mono % 7.7      Eos % 0.9      Basophil % 0.3      Imm Grans % 0.2      Neut # 3.33      Lymph # 2.49      Mono # 0.49      Eos # 0.06       Baso # 0.02      Imm Grans # 0.01     TROPONIN I - Normal    Troponin-I <0.006     B-TYPE NATRIURETIC PEPTIDE - Normal    BNP 11     TROPONIN I - Normal    Troponin-I <0.006     CBC W/ AUTO DIFFERENTIAL    Narrative:     The following orders were created for panel order CBC auto differential.  Procedure                               Abnormality         Status                     ---------                               -----------         ------                     CBC with Differential[[id number]]       Abnormal            Final result                 Please view results for these tests on the individual orders.       Imaging   Imaging ordered and independently reviewed by me:   Imaging Results              X-Ray Chest AP Portable (Final result)  Result time 07/18/25 09:12:40      Final result by Francisca Landeros MD (07/18/25 09:12:40)                   Impression:      No acute abnormality.      Electronically signed by: Francisca Landeros MD  Date:    07/18/2025  Time:    09:12               Narrative:    EXAMINATION:  XR CHEST AP PORTABLE    CLINICAL HISTORY:  Chest Pain;    TECHNIQUE:  Single frontal view of the chest was performed.    COMPARISON:  01/26/2024    FINDINGS:  The lungs are clear, with normal appearance of pulmonary vasculature and no pleural effusion or pneumothorax.    The cardiac silhouette is normal in size. The hilar and mediastinal contours are unremarkable.    Bones are intact.                                         Additional Consideration   Leni Henson  presents to the Emergency Department today with chest pressure.  Has never had a cardiac workup before.  Heart score is 3. Will obtain delta troponin.  Disposition pending results.    Additional testing considered but not indicated during the course of this workup: further imaging and labwork, not indicated  Co-morbidities/chronic illness/exacerbation of chronic illness considered which impacted clinical decision making:  History of  colon cancer  Procedures done in the ED or plan for the OR: No  Social determinants of care considered during development of treatment plan include: Decreased medical literacy  Discussion of management or test interpretation with external provider: No  DNR discussion: No    The patient's list of active medications and allergies as documented per RN staff has been reviewed.  Medications given in the ED and/or prescribed:   Medications   aspirin tablet 325 mg (325 mg Oral Given 7/18/25 0906)             ED Course as of 07/18/25 1143   Fri Jul 18, 2025   1005 CBC auto differential(!)  Independently interpreted by me; unremarkable or consistent with the patient's baseline   [CS]   1005 Comprehensive metabolic panel(!)  Independently interpreted by me; unremarkable or consistent with the patient's baseline   [CS]   1005 BNP: 11 [CS]   1005 Troponin I: <0.006 [CS]   1011 Upon reassessment, patient's chest pain has subsided.  Will await delta troponin, if this is negative, will discharge with cardiology referral [CS]   1143 Troponin I: <0.006 [CS]   1143 Negative delta troponin.  Low heart score, will refer urgently to Cardiology.  Strict return precautions discussed. [CS]      ED Course User Index  [CS] Sabine Hernandez MD       Explanation of disposition: Discharge    Clinical Impression:     1. Chest pressure    2. Chest pain         All results from the workup were reviewed with the patient/family in detail. I discussed with the patient and/or the family/caregiver that today's evaluation in the ED does not suggest any emergent or life-threatening medical conditions that would require hospitalization or immediate intervention beyond what was provided in the ED. I believe the patient is safe for discharge.  However, a reassuring evaluation in the ED does not preclude the presence or development of a serious or life-threatening condition. As such, strict return precautions were discussed with the patient expressing  understanding of instructions, and all questions answered. The patient/family communicates understanding of all this information and all remaining questions and concerns were addressed at this time.    The patient/family has been provided with verbal and printed direction regarding our final diagnosis(es) as well as instructions regarding use of OTC and/or Rx medications intended to manage the patient's aforementioned conditions including:  ED Prescriptions    None       The patient's condition does not warrant review of the  and prescription of controlled substances.      ED Disposition Condition    Discharge Stable                 [1]   Social History  Tobacco Use    Smoking status: Never    Smokeless tobacco: Never   Substance Use Topics    Alcohol use: Yes     Comment: social    Drug use: No        Sabine Hernandez MD  07/18/25 [id number]

## 2025-07-19 LAB
OHS QRS DURATION: 78 MS
OHS QRS DURATION: 80 MS
OHS QTC CALCULATION: 361 MS
OHS QTC CALCULATION: 392 MS

## 2025-09-02 ENCOUNTER — HOSPITAL ENCOUNTER (EMERGENCY)
Facility: HOSPITAL | Age: 55
Discharge: HOME | End: 2025-09-02
Attending: STUDENT IN AN ORGANIZED HEALTH CARE EDUCATION/TRAINING PROGRAM

## 2025-09-02 VITALS
WEIGHT: 235 LBS | DIASTOLIC BLOOD PRESSURE: 82 MMHG | TEMPERATURE: 99 F | RESPIRATION RATE: 20 BRPM | BODY MASS INDEX: 37.77 KG/M2 | HEIGHT: 66 IN | OXYGEN SATURATION: 98 % | HEART RATE: 78 BPM | SYSTOLIC BLOOD PRESSURE: 133 MMHG

## 2025-09-02 DIAGNOSIS — U07.1 COVID-19: Primary | ICD-10-CM

## 2025-09-02 LAB
CTP QC/QA: YES
GROUP A STREP MOLECULAR (OHS): NEGATIVE
SARS-COV-2 RDRP RESP QL NAA+PROBE: POSITIVE

## 2025-09-02 PROCEDURE — 99284 EMERGENCY DEPT VISIT MOD MDM: CPT

## 2025-09-02 PROCEDURE — 87635 SARS-COV-2 COVID-19 AMP PRB: CPT | Performed by: NURSE PRACTITIONER

## 2025-09-02 PROCEDURE — 87651 STREP A DNA AMP PROBE: CPT | Performed by: NURSE PRACTITIONER

## 2025-09-02 RX ORDER — FLUTICASONE PROPIONATE 50 MCG
1 SPRAY, SUSPENSION (ML) NASAL 2 TIMES DAILY PRN
Qty: 15 G | Refills: 0 | Status: SHIPPED | OUTPATIENT
Start: 2025-09-02

## 2025-09-02 RX ORDER — BENZONATATE 100 MG/1
200 CAPSULE ORAL 3 TIMES DAILY PRN
Qty: 20 CAPSULE | Refills: 0 | Status: SHIPPED | OUTPATIENT
Start: 2025-09-02 | End: 2025-09-12

## 2025-09-02 RX ORDER — ALBUTEROL SULFATE 90 UG/1
1-2 INHALANT RESPIRATORY (INHALATION) EVERY 6 HOURS PRN
Qty: 8 G | Refills: 0 | Status: SHIPPED | OUTPATIENT
Start: 2025-09-02 | End: 2026-09-02

## (undated) DEVICE — KIT ANTIFOG

## (undated) DEVICE — DRAPE ABDOMINAL TIBURON 14X11

## (undated) DEVICE — SUT MONOSOF 2-0 18 BLK C-14

## (undated) DEVICE — SEE MEDLINE ITEM 154981

## (undated) DEVICE — SYR ONLY LUER LOCK 20CC

## (undated) DEVICE — RELOAD CONTOUR 40MM GREEN

## (undated) DEVICE — STAPLER CIRCULAR XL SEAL 29MM

## (undated) DEVICE — TUBING HF INSUFFLATION W/ FLTR

## (undated) DEVICE — SUT ENDOLOOP PDSII 18 LIGA

## (undated) DEVICE — NDL 22GA X1 1/2 REG BEVEL

## (undated) DEVICE — SUT 3-0 VICRYL SH CR/8 18

## (undated) DEVICE — SET DECANTER MEDICHOICE

## (undated) DEVICE — DRESSING ABSRBNT ISLAND 3.6X8

## (undated) DEVICE — Device

## (undated) DEVICE — SEE MEDLINE ITEM 157144

## (undated) DEVICE — SOL NS 1000CC

## (undated) DEVICE — SUT 3/0 27IN PDS II VIO MO

## (undated) DEVICE — DRESSING LEUKOPLAST FLEX 1X3IN

## (undated) DEVICE — SEE MEDLINE ITEM 146347

## (undated) DEVICE — NDL BOX COUNTER

## (undated) DEVICE — SEE MEDLINE ITEM 156902

## (undated) DEVICE — CLIPPER BLADE MOD 4406 (CAREF)

## (undated) DEVICE — SUT CTD VICRYL 2-0 UND BR

## (undated) DEVICE — DRESSING ADH ISLAND 3.6 X 14

## (undated) DEVICE — ELECTRODE REM PLYHSV RETURN 9

## (undated) DEVICE — LUBRICANT SURGILUBE 2 OZ

## (undated) DEVICE — SUT CTD VICRYL VIL BR CR/SH

## (undated) DEVICE — DRESSING MEPORE ADH 3.5X12

## (undated) DEVICE — STAPLER INTERNL CONTOR CV BLU

## (undated) DEVICE — BANDAGE ADHESIVE

## (undated) DEVICE — LEGGINGS 48X31 INCH

## (undated) DEVICE — SEE MEDLINE ITEM 157117

## (undated) DEVICE — COVER LIGHT HANDLE 80/CA

## (undated) DEVICE — SEE MEDLINE ITEM 146417

## (undated) DEVICE — SEE MEDLINE ITEM 152487

## (undated) DEVICE — TRAY FOLEY 16FR INFECTION CONT